# Patient Record
Sex: FEMALE | Race: OTHER | NOT HISPANIC OR LATINO | Employment: OTHER | URBAN - NONMETROPOLITAN AREA
[De-identification: names, ages, dates, MRNs, and addresses within clinical notes are randomized per-mention and may not be internally consistent; named-entity substitution may affect disease eponyms.]

---

## 2017-01-05 ENCOUNTER — OFFICE VISIT (OUTPATIENT)
Dept: OBSTETRICS AND GYNECOLOGY | Facility: CLINIC | Age: 67
End: 2017-01-05

## 2017-01-05 VITALS
SYSTOLIC BLOOD PRESSURE: 120 MMHG | WEIGHT: 163 LBS | HEIGHT: 64 IN | DIASTOLIC BLOOD PRESSURE: 84 MMHG | BODY MASS INDEX: 27.83 KG/M2

## 2017-01-05 DIAGNOSIS — Z78.9 NONSMOKER: Primary | ICD-10-CM

## 2017-01-05 DIAGNOSIS — N39.3 SUI (STRESS URINARY INCONTINENCE, FEMALE): ICD-10-CM

## 2017-01-05 DIAGNOSIS — N81.11 MIDLINE CYSTOCELE: ICD-10-CM

## 2017-01-05 DIAGNOSIS — N39.41 URGE INCONTINENCE: ICD-10-CM

## 2017-01-05 PROCEDURE — 99214 OFFICE O/P EST MOD 30 MIN: CPT | Performed by: OBSTETRICS & GYNECOLOGY

## 2017-01-05 RX ORDER — TOLTERODINE 4 MG/1
4 CAPSULE, EXTENDED RELEASE ORAL DAILY
Qty: 30 CAPSULE | Refills: 1 | Status: SHIPPED | OUTPATIENT
Start: 2017-01-05 | End: 2017-02-23 | Stop reason: SDUPTHER

## 2017-01-05 NOTE — PROGRESS NOTES
"Subjective   Chief Complaint   Patient presents with   • Vaginal Prolapse     pt here today with c/o prolapse. pt says that she has tried the pessary and does not like it. pt says that she has trouble holding urine. pt says that when she gets the urge she has to find a bathroom pretty quick. pt voices no other concerns.      Reina Palomares is a 66 y.o. year old who presents to be seen for pelvic prolapse.      [unfilled]    The patient is not s/p hysterectomy.  She reports positive urinary incontinence and urgency but denies pelvic pain, vaginal pressure, bulge outside her body, pain with intercourse and stool trapping.  The patient feels like the problem began several years ago.  She is not currently sexually active, and is interested in being sexually active in the future.  Reina Palomares has not had surgery for this problem in the past.  Non-smoker.  She drinks mostly decaf coffee and does not drink soda.  She has never tried an anticholinergic.  She did have someone attempt to place a pessary, but they could not find one that fit well.    Past Medical History   Diagnosis Date   • Cancer      LUNG   • Hyperthyroidism    • Infectious viral hepatitis      H/O HEPATITIS C   • Swelling of lower extremity        Current Outpatient Prescriptions:   •  conjugated estrogens (PREMARIN) 0.625 MG/GM vaginal cream, Use 0.5 gm nightly for 2 weeks then decrease to 2 times a week., Disp: 30 g, Rfl: 3  •  gabapentin (NEURONTIN) 100 MG capsule, TK 2 CS PO BID, Disp: , Rfl: 2  •  levothyroxine (SYNTHROID, LEVOTHROID) 75 MCG tablet, TK 1 T PO QAM ON AN EMPTY STOMACH, Disp: , Rfl: 6  •  metoprolol tartrate (LOPRESSOR) 25 MG tablet, Take 25 mg by mouth 2 (two) times a day., Disp: , Rfl:   •  omeprazole (PriLOSEC) 40 MG capsule, , Disp: , Rfl:   •  PROLENSA 0.07 % solution, INSTILL 1 DROP IN THE LEFT EYE QD, Disp: , Rfl: 5  •  SAFETY-AD TB SYRINGE 27GX.5\" 27G X 1/2\" 1 ML misc, USE AS DIRECTED WITH ALLERGY SERUM, Disp: , Rfl: 2  •  " "sertraline (ZOLOFT) 50 MG tablet, Take 50 mg by mouth daily., Disp: , Rfl:   •  zolpidem (AMBIEN) 5 MG tablet, TK 1 T PO QHS, Disp: , Rfl: 2  Family History   Problem Relation Age of Onset   • Cancer Other    • Coronary artery disease Other    • Thyroid disease Other    • Melanoma Other    • Melanoma Sister      LEFT ARM   • Hypothyroidism Sister    • Cancer Brother    • Coronary artery disease Brother    • Hyperthyroidism Sister    • Other Mother    • Other Father      Social History     Social History   • Marital status:      Spouse name: N/A   • Number of children: N/A   • Years of education: N/A     Occupational History   • DISABLED      Social History Main Topics   • Smoking status: Former Smoker     Packs/day: 1.50     Types: Cigarettes     Quit date: 2008   • Smokeless tobacco: Never Used      Comment: LESS THAN 1 PPD FOR 12 YEARS   • Alcohol use Yes      Comment: OCCASIONAL WINE   • Drug use: No   • Sexual activity: Defer     Other Topics Concern   • Not on file     Social History Narrative     Allergies   Allergen Reactions   • Dilaudid [Hydromorphone Hcl]    • Myrbetriq [Mirabegron] Hives       Family History   Problem Relation Age of Onset   • Cancer Other    • Coronary artery disease Other    • Thyroid disease Other    • Melanoma Other    • Melanoma Sister      LEFT ARM   • Hypothyroidism Sister    • Cancer Brother    • Coronary artery disease Brother    • Hyperthyroidism Sister    • Other Mother    • Other Father      Review of Systems   Respiratory: Negative for shortness of breath.    Cardiovascular: Negative for chest pain.   Gastrointestinal: Negative for abdominal pain, constipation and diarrhea.   Genitourinary: Negative for difficulty urinating, dysuria, pelvic pain and vaginal bleeding.           Objective   Visit Vitals   • /84   • Ht 64\" (162.6 cm)   • Wt 163 lb (73.9 kg)   • BMI 27.98 kg/m2       Physical Exam   Constitutional: She appears well-developed and well-nourished. No " distress.   HENT:   Head: Normocephalic and atraumatic.   Eyes: EOM are normal.   Neck: Normal range of motion.   Pulmonary/Chest: Effort normal.   Abdominal: Soft. There is no tenderness.   Genitourinary: Pelvic exam was performed with patient supine.   Skin: Skin is warm and dry.   Psychiatric: She has a normal mood and affect. Judgment and thought content normal.   Nursing note and vitals reviewed.      Imaging   No data reviewed       Assessment & Plan  Reina was seen today for vaginal prolapse.    Diagnoses and all orders for this visit:    Nonsmoker    Urge incontinence  Comments:  Patient going to try an Detrol LA 4 mg.  RTO in 2 months    JOSE (stress urinary incontinence, female): appears to be less of a problem than urge incontinence.  Will discuss whether sling is necessary after trial of Detrol.  Patient may still need urodynamics  Comments:  with coughing    Midline cystocele: Likely not a contributing factor to urge or JOSE.  She should not have incomplete emptying due to her current level of prolapse.  Comments:  Grade II      Na Kate MD  1/5/2017  10:05 AM

## 2017-01-05 NOTE — MR AVS SNAPSHOT
"                        Reina Palomares   1/5/2017 9:45 AM   Office Visit    Dept Phone:  [phone]   Encounter #:  [id number]    Provider:  Na Kate MD   Department:  [facility]                Your Full Care Plan              Today's Medication Changes          These changes are accurate as of: 1/5/17 10:22 AM.  If you have any questions, ask your nurse or doctor.               New Medication(s)Ordered:     tolterodine LA 4 MG 24 hr capsule   Commonly known as:  DETROL LA   Take 1 capsule by mouth Daily.            Where to Get Your Medications      These medications were sent to [facility] [address] [place] [address] - [address] [facility] AT [address] - [phone] [facility] [phone]   [address] [place] [id number]     Phone:  [phone]     tolterodine LA 4 MG 24 hr capsule                  Your Updated Medication List          This list is accurate as of: 1/5/17 10:22 AM.  Always use your most recent med list.                conjugated estrogens 0.625 MG/GM vaginal cream   Commonly known as:  PREMARIN   Use 0.5 gm nightly for 2 weeks then decrease to 2 times a week.       gabapentin 100 MG capsule   Commonly known as:  NEURONTIN       levothyroxine 75 MCG tablet   Commonly known as:  SYNTHROID, LEVOTHROID       metoprolol tartrate 25 MG tablet   Commonly known as:  LOPRESSOR       omeprazole 40 MG capsule   Commonly known as:  priLOSEC       PROLENSA 0.07 % solution   Generic drug:  Bromfenac Sodium       SAFETY-AD TB SYRINGE 27GX.5\" 27G X 1/2\" 1 ML misc   Generic drug:  Tuberculin Syringe       sertraline 50 MG tablet   Commonly known as:  ZOLOFT       tolterodine LA 4 MG 24 hr capsule   Commonly known as:  DETROL LA   Take 1 capsule by mouth Daily.       zolpidem 5 MG tablet   Commonly known as:  AMBIEN               You Were Diagnosed With        Codes Comments    Nonsmoker    -  Primary ICD-10-CM: Z78.9  ICD-9-CM: V49.89     Urge incontinence     ICD-10-CM: " N39.41  ICD-9-CM: 788.31 Patient going to try an anticholinergic    JOSE (stress urinary incontinence, female)     ICD-10-CM: N39.3  ICD-9-CM: 625.6 with coughing    Midline cystocele     ICD-10-CM: N81.11  ICD-9-CM: 618.01 Grade II      Instructions     None    Patient Instructions History      Upcoming Appointments     Visit Type Date Time Department    OFFICE VISIT 2017  9:45 AM [facility]    COLPOSCOPY 3/9/2017  9:00 AM [facility]    FOLLOW UP 2017  2:45 PM [facility]      MyChart Signup     [facility] Like[employer] allows you to send messages to your doctor, view your test results, renew your prescriptions, schedule appointments, and more. To sign up, go to [employer]view and click on the Sign Up Now link in the New User? box. Enter your Like[employer] Activation Code exactly as it appears below along with the last four digits of your Social Security Number and your Date of Birth () to complete the sign-up process. If you do not sign up before the expiration date, you must request a new code.    Likeeds Activation Code: [email or web address]  Expires: 2017  4:36 AM    If you have questions, you can email [email or web address] or call [phone] to talk to our Like[employer] staff. Remember, Likeeds is NOT to be used for urgent needs. For medical emergencies, dial 911.               Other Info from Your Visit           Your Appointments     Mar 09, 2017  9:00 AM CST   COLPOSCOPY with Na Kate MD   [facility] (--)    [address]  [place] [address] [phone]            Aug 29, 2017  2:45 PM CDT   Follow Up with Elan Mathews DO   [facility] (--)    [address]  [place] [id number] [phone]           Arrive 15 minutes prior to appointment.              Allergies     Dilaudid [Hydromorphone Hcl]      Myrbetriq [Mirabegron]  Hives      Reason for Visit     Vaginal  "Prolapse pt here today with c/o prolapse. pt says that she has tried the pessary and does not like it. pt says that she has trouble holding urine. pt says that when she gets the urge she has to find a bathroom pretty quick. pt voices no other concerns.       Vital Signs     Blood Pressure Height Weight Body Mass Index Smoking Status       120/84 64\" (162.6 cm) 163 lb (73.9 kg) 27.98 kg/m2 Former Smoker       Problems and Diagnoses Noted     Nonsmoker    -  Primary    Urge incontinence of urine        JOSE (stress urinary incontinence, female)        Fallen bladder            "

## 2017-02-01 ENCOUNTER — TELEPHONE (OUTPATIENT)
Dept: OBSTETRICS AND GYNECOLOGY | Facility: CLINIC | Age: 67
End: 2017-02-01

## 2017-02-01 NOTE — TELEPHONE ENCOUNTER
Can you call her back and clarify...She had a colpo of her Vulva in September.  At that time, I offered her the options of repeating her colpo in 6 months or seeing Mary Ann.  She preferred to return for another colpo of her vulva, and I was under the impression that we would be doing that in March.  If she prefers, then we can go ahead and set her up with Mary Ann now.  Thanks

## 2017-02-23 DIAGNOSIS — N39.41 URGE INCONTINENCE: ICD-10-CM

## 2017-02-23 RX ORDER — TOLTERODINE 4 MG/1
CAPSULE, EXTENDED RELEASE ORAL
Qty: 30 CAPSULE | Refills: 0 | Status: SHIPPED | OUTPATIENT
Start: 2017-02-23 | End: 2017-03-19 | Stop reason: SDUPTHER

## 2017-03-19 DIAGNOSIS — N39.41 URGE INCONTINENCE: ICD-10-CM

## 2017-03-20 RX ORDER — TOLTERODINE 4 MG/1
CAPSULE, EXTENDED RELEASE ORAL
Qty: 30 CAPSULE | Refills: 0 | Status: SHIPPED | OUTPATIENT
Start: 2017-03-20 | End: 2017-09-08

## 2017-03-21 ENCOUNTER — TELEPHONE (OUTPATIENT)
Dept: OBSTETRICS AND GYNECOLOGY | Facility: CLINIC | Age: 67
End: 2017-03-21

## 2017-06-21 ENCOUNTER — TELEPHONE (OUTPATIENT)
Dept: OBSTETRICS AND GYNECOLOGY | Facility: CLINIC | Age: 67
End: 2017-06-21

## 2017-06-21 NOTE — TELEPHONE ENCOUNTER
Spoke with pt RE: if follow up needed for her visit with Dr. Reese 2-2017 where she had a vaginal laser procedure for VAIN 1. She stated that per their office she was to follow up with pap in 1 year for her yearly exam at our office. She was unclear if they meant one year from her visit with them or when her yearly is due (Hortensia 15, 2017) . I will call Mary Ann's office to clarify.

## 2017-06-21 NOTE — TELEPHONE ENCOUNTER
I spoke with Dr. Reese's office to find out her planned follow up. They said that she has a follow up appt with their office on 3-21-18 at 1100. Left message on pt machine with this information.

## 2017-09-08 ENCOUNTER — HOSPITAL ENCOUNTER (OUTPATIENT)
Dept: ULTRASOUND IMAGING | Facility: HOSPITAL | Age: 67
Discharge: HOME | End: 2017-09-08
Admitting: NURSE PRACTITIONER

## 2017-09-08 ENCOUNTER — OFFICE VISIT (OUTPATIENT)
Dept: VASCULAR SURGERY | Facility: CLINIC | Age: 67
End: 2017-09-08

## 2017-09-08 VITALS
HEART RATE: 92 BPM | BODY MASS INDEX: 28.09 KG/M2 | HEIGHT: 64 IN | WEIGHT: 164.5 LBS | SYSTOLIC BLOOD PRESSURE: 118 MMHG | DIASTOLIC BLOOD PRESSURE: 76 MMHG | RESPIRATION RATE: 18 BRPM

## 2017-09-08 DIAGNOSIS — M79.605 LEFT LEG PAIN: ICD-10-CM

## 2017-09-08 DIAGNOSIS — M79.605 LEFT LEG PAIN: Primary | ICD-10-CM

## 2017-09-08 PROCEDURE — 93971 EXTREMITY STUDY: CPT

## 2017-09-08 PROCEDURE — 99213 OFFICE O/P EST LOW 20 MIN: CPT | Performed by: NURSE PRACTITIONER

## 2017-09-08 RX ORDER — TRAMADOL HYDROCHLORIDE 50 MG/1
TABLET ORAL
Refills: 0 | COMMUNITY
Start: 2017-06-27 | End: 2017-09-08

## 2017-09-08 RX ORDER — LEVOTHYROXINE SODIUM 88 UG/1
TABLET ORAL
Refills: 2 | Status: ON HOLD | COMMUNITY
Start: 2017-07-23 | End: 2018-12-28

## 2017-09-08 RX ORDER — AMLODIPINE BESYLATE 5 MG/1
TABLET ORAL
Refills: 5 | Status: ON HOLD | COMMUNITY
Start: 2017-08-15 | End: 2018-12-28

## 2017-09-08 NOTE — PROGRESS NOTES
"09/08/2017      Carrie Darby DO  [address] [place] [address]        Reina Palomares  1950    Chief Complaint   Patient presents with   • Follow-up     1 year         Dear Carrie Darby DO:    HPI     I had the pleasure of seeing you patient in the office today for follow up.  As you recall, the patient is a 67 y.o. female who we are currently following for Routine health maintenance.  She has been having pain in her left leg for the past couple weeks, and states she can feel a knot to her left calf at times.  She was being followed for venous insufficiency in our office however did not come back for follow-up.  She states she is having trouble walking.  She has mild swelling noted to her left lower extremity.    /76 (BP Location: Left arm, Patient Position: Sitting, Cuff Size: Adult)  Pulse 92  Resp 18  Ht 64\" (162.6 cm)  Wt 164 lb 8 oz (74.6 kg)  BMI 28.24 kg/m2  Physical Exam   Constitutional: She is oriented to person, place, and time. She appears well-developed and well-nourished. No distress.   HENT:   Head: Normocephalic and atraumatic.   Mouth/Throat: Oropharynx is clear and moist.   Eyes: Pupils are equal, round, and reactive to light. No scleral icterus.   Neck: Normal range of motion. Neck supple. No JVD present. Carotid bruit is not present. No thyromegaly present.   Cardiovascular: Normal rate, regular rhythm, S2 normal, normal heart sounds, intact distal pulses and normal pulses.  Exam reveals no gallop and no friction rub.    No murmur heard.  Tenderness to left calf   Pulmonary/Chest: Effort normal and breath sounds normal.   Abdominal: Soft. Normal aorta and bowel sounds are normal. There is no hepatosplenomegaly.   Musculoskeletal: Normal range of motion.   Neurological: She is alert and oriented to person, place, and time. No cranial nerve deficit.   Skin: Skin is warm and dry. She is not diaphoretic.   Psychiatric: She has a normal mood and affect. Her " behavior is normal. Judgment and thought content normal.   Nursing note and vitals reviewed.      DIAGNOSTIC DATA:    Us Venous Doppler Lower Extremity Left (duplex)    Result Date: 9/8/2017  Narrative: EXAMINATION:   US VENOUS DOPPLER LOWER EXTREMITY LEFT (DUPLEX)- 9/8/2017 3:30 PM CST  HISTORY: US VENOUS DOPPLER LOWER EXTREMITY LEFT (DUPLEX)- 9/8/2017 2:59 PM CST  HISTORY: left leg pain r/o DVT; M79.605-Pain In Left Leg  COMPARISON: NONE  FINDINGS: Transverse and longitudinal grayscale and duplex and Doppler sonographic images of the left lower extremity were obtained.  There is normal color flow, augmentation and compressibility of the left common femoral, superficial femoral, popliteal, peroneal,  and posterior tibial veins.  A 7 x 18 mm hypoechoic density is present in the medial left calf. This may be a varicosity.   Color flow is present in the anterior tibial vein..      Impression: 1. Negative duplex ultrasound of the left lower extremity without evidence of DVT. 2. Probable varicose vein or lymph node in the medial left calf  This report was finalized on 09/08/2017 16:33 by Dr. Bernardo Milian MD.      Patient Active Problem List   Diagnosis   • Swelling of lower extremity   • GERD (gastroesophageal reflux disease)   • Hypothyroidism         ICD-10-CM ICD-9-CM   1. Left leg pain M79.605 729.5       Lab Frequency Next Occurrence   Comprehensive Metabolic Panel Once 10/13/2016   CBC & Differential Once 10/13/2016       PLAN: After thoroughly evaluating Reina Palomares, I believe the best course of action is to remain conservative from a vascular standpoint.  Her testing was negative for a DVT.  She states she still can hardly walk.  I did urge her to wear compression stockings.  If the pain persists, I encouraged her to follow up with primary care.  The ultrasound of her left leg showed probable varicose vein but could be a lymph node.  There is no intervention needed from a vascular standpoint.  The patient is  to continue taking their medications as previously discussed.   This was all discussed in full with complete understanding.  Thank you for allowing me to participate in the care of your patient.  Please do not hesitate to call with any questions or concerns.  We will keep you aware of any further encounters with Reina Palomares.      Sincerely Yours,      TRACEY Batista

## 2017-09-11 ENCOUNTER — TELEPHONE (OUTPATIENT)
Dept: VASCULAR SURGERY | Facility: CLINIC | Age: 67
End: 2017-09-11

## 2017-09-11 NOTE — TELEPHONE ENCOUNTER
Called patient and gave vascular study report.Instructed to follow up with PCP for persistent symptoms.Voiced understanding.

## 2017-09-11 NOTE — TELEPHONE ENCOUNTER
----- Message from TRACEY Batista sent at 9/11/2017 10:26 AM CDT -----  Sandra can you call and give her recent venous duplex results.  There was no dvt..      Impression: 1. Negative duplex ultrasound of the left lower extremity without evidence of DVT. 2. Probable varicose vein or lymph node in the medial left calf  This report was finalized on 09/08/2017 16:33 by Dr. Bernardo Milian MD.    Her pain may be related to that varicose vein.  If it delgado not resolve she should follow with her PCP.  Nothing needed from a vascular standpoint.

## 2017-09-11 NOTE — TELEPHONE ENCOUNTER
I tried to call her Friday, and no answer.  It just says her number on voicemail so I did not want to leave results.  I tried to call her again but no answer.  Ill route to have Sandra try in a bit.

## 2017-09-21 ENCOUNTER — OFFICE VISIT (OUTPATIENT)
Dept: OBSTETRICS AND GYNECOLOGY | Facility: CLINIC | Age: 67
End: 2017-09-21

## 2017-09-21 VITALS
SYSTOLIC BLOOD PRESSURE: 120 MMHG | HEIGHT: 64 IN | WEIGHT: 165 LBS | DIASTOLIC BLOOD PRESSURE: 76 MMHG | BODY MASS INDEX: 28.17 KG/M2

## 2017-09-21 DIAGNOSIS — N64.4 BREAST PAIN IN FEMALE: Primary | ICD-10-CM

## 2017-09-21 DIAGNOSIS — Z12.31 ENCOUNTER FOR SCREENING MAMMOGRAM FOR MALIGNANT NEOPLASM OF BREAST: ICD-10-CM

## 2017-09-21 PROCEDURE — 99213 OFFICE O/P EST LOW 20 MIN: CPT | Performed by: NURSE PRACTITIONER

## 2017-09-21 NOTE — PROGRESS NOTES
"Subjective   Reina Palomares is a 67 y.o. female.     HPI Comments: Breast pain.       The following portions of the patient's history were reviewed and updated as appropriate: allergies, current medications, past family history, past medical history, past social history, past surgical history and problem list.    /76  Ht 64\" (162.6 cm)  Wt 165 lb (74.8 kg)  BMI 28.32 kg/m2    Review of Systems   Constitutional: Negative for activity change, appetite change, fatigue and fever.        Breast pain, last mammogram mentioned a clip.   About 3 wks ago, there was a pop when she leaned over the tub and the breast has been very sore since.    Respiratory: Negative for apnea and shortness of breath.    Cardiovascular: Negative for chest pain and palpitations.   Gastrointestinal: Negative for abdominal distention, abdominal pain, constipation, diarrhea, nausea and vomiting.   Endocrine: Negative for cold intolerance and heat intolerance.   Genitourinary: Negative for difficulty urinating, frequency, menstrual problem, pelvic pain, vaginal discharge and vaginal pain.   Neurological: Negative for headaches.   Psychiatric/Behavioral: Negative for agitation and sleep disturbance.       Objective   Physical Exam   Constitutional: She is oriented to person, place, and time. She appears well-developed.   Eyes: Right eye exhibits no discharge. Left eye exhibits no discharge.   Cardiovascular: Normal rate and regular rhythm.    Pulmonary/Chest: Effort normal and breath sounds normal. Right breast exhibits no inverted nipple, no mass, no nipple discharge, no skin change and no tenderness. Left breast exhibits tenderness. Left breast exhibits no inverted nipple, no mass, no nipple discharge and no skin change. Breasts are symmetrical. There is no breast swelling.   Genitourinary: No breast discharge or bleeding.   Neurological: She is alert and oriented to person, place, and time.   Skin: Skin is warm.   Psychiatric: She has a " normal mood and affect. Her behavior is normal. Judgment and thought content normal.   Vitals reviewed.      Assessment/Plan    Reviewed mammogram from 4/27/2016, report indicated BI-RADS Category 2 Benign.  The clip pt mentions is noted at the left axilla per mammogram report.   Discussed results and notations with pt.   Will order diagnostic left mammogram and US PRN, and a screening mammogram for right breast.   RV based on results.  Reina was seen today for breast pain.    Diagnoses and all orders for this visit:    Breast pain in female  -     Mammo Diagnostic Digital Tomosynthesis Bilateral With CAD; Future  -     US breast left limited; Future    Encounter for screening mammogram for malignant neoplasm of breast  -     Mammo screening modified with tomosynthesis right w CAD; Future

## 2017-09-28 ENCOUNTER — TELEPHONE (OUTPATIENT)
Dept: OBSTETRICS AND GYNECOLOGY | Facility: CLINIC | Age: 67
End: 2017-09-28

## 2017-09-28 ENCOUNTER — HOSPITAL ENCOUNTER (OUTPATIENT)
Dept: ULTRASOUND IMAGING | Facility: HOSPITAL | Age: 67
Discharge: HOME | End: 2017-09-28

## 2017-09-28 ENCOUNTER — HOSPITAL ENCOUNTER (OUTPATIENT)
Dept: MAMMOGRAPHY | Facility: HOSPITAL | Age: 67
Discharge: HOME | End: 2017-09-28
Admitting: NURSE PRACTITIONER

## 2017-09-28 DIAGNOSIS — N64.4 BREAST PAIN IN FEMALE: ICD-10-CM

## 2017-09-28 PROCEDURE — G0279 TOMOSYNTHESIS, MAMMO: HCPCS

## 2017-09-28 PROCEDURE — G0204 DX MAMMO INCL CAD BI: HCPCS

## 2017-09-28 PROCEDURE — 76642 ULTRASOUND BREAST LIMITED: CPT

## 2017-09-28 NOTE — TELEPHONE ENCOUNTER
Spoke with pt, reviewed results from mammo and US.   Will refer to surgeon. Pt desires to go to a surgeon in [place]. Pt to call with physician's name.     Will submit referral as soon as I have that information.

## 2017-10-02 DIAGNOSIS — N64.4 BREAST PAIN: Primary | ICD-10-CM

## 2018-01-19 ENCOUNTER — HOSPITAL ENCOUNTER (OUTPATIENT)
Dept: PREADMISSION TESTING | Age: 68
Setting detail: OUTPATIENT SURGERY
Discharge: HOME | End: 2018-01-19

## 2018-01-19 VITALS — HEIGHT: 65 IN | BODY MASS INDEX: 26.99 KG/M2 | WEIGHT: 162 LBS

## 2018-01-22 ENCOUNTER — HOSPITAL ENCOUNTER (OUTPATIENT)
Dept: LAB | Age: 68
Discharge: HOME | End: 2018-01-22
Payer: MEDICARE

## 2018-01-22 ENCOUNTER — HOSPITAL ENCOUNTER (OUTPATIENT)
Dept: NON INVASIVE DIAGNOSTICS | Age: 68
Discharge: HOME | End: 2018-01-22
Payer: MEDICARE

## 2018-01-22 ENCOUNTER — HOSPITAL ENCOUNTER (OUTPATIENT)
Dept: GENERAL RADIOLOGY | Age: 68
Discharge: HOME | End: 2018-01-22
Payer: MEDICARE

## 2018-01-22 DIAGNOSIS — Z01.818 PREOP EXAMINATION: ICD-10-CM

## 2018-01-22 LAB
ALBUMIN SERPL-MCNC: 4.4 G/DL (ref 3.5–5.2)
ALP BLD-CCNC: 73 U/L (ref 35–104)
ALT SERPL-CCNC: 19 U/L (ref 5–33)
ANION GAP SERPL CALCULATED.3IONS-SCNC: 11 MMOL/L (ref 7–19)
AST SERPL-CCNC: 25 U/L (ref 5–32)
BASOPHILS ABSOLUTE: 0.1 K/UL (ref 0–0.2)
BASOPHILS RELATIVE PERCENT: 1 % (ref 0–1)
BILIRUB SERPL-MCNC: 0.6 MG/DL (ref 0.2–1.2)
BUN BLDV-MCNC: 17 MG/DL (ref 8–23)
CALCIUM SERPL-MCNC: 9.4 MG/DL (ref 8.8–10.2)
CHLORIDE BLD-SCNC: 103 MMOL/L (ref 98–111)
CO2: 27 MMOL/L (ref 22–29)
CREAT SERPL-MCNC: 0.7 MG/DL (ref 0.5–0.9)
EOSINOPHILS ABSOLUTE: 0.1 K/UL (ref 0–0.6)
EOSINOPHILS RELATIVE PERCENT: 2.1 % (ref 0–5)
GFR NON-AFRICAN AMERICAN: >60
GLUCOSE BLD-MCNC: 92 MG/DL (ref 74–109)
HCT VFR BLD CALC: 39.4 % (ref 37–47)
HEMOGLOBIN: 12.9 G/DL (ref 12–16)
LYMPHOCYTES ABSOLUTE: 1.4 K/UL (ref 1.1–4.5)
LYMPHOCYTES RELATIVE PERCENT: 27.5 % (ref 20–40)
MCH RBC QN AUTO: 30.9 PG (ref 27–31)
MCHC RBC AUTO-ENTMCNC: 32.7 G/DL (ref 33–37)
MCV RBC AUTO: 94.5 FL (ref 81–99)
MONOCYTES ABSOLUTE: 0.5 K/UL (ref 0–0.9)
MONOCYTES RELATIVE PERCENT: 10.5 % (ref 0–10)
NEUTROPHILS ABSOLUTE: 3 K/UL (ref 1.5–7.5)
NEUTROPHILS RELATIVE PERCENT: 58.7 % (ref 50–65)
PDW BLD-RTO: 13 % (ref 11.5–14.5)
PLATELET # BLD: 135 K/UL (ref 130–400)
PMV BLD AUTO: 10.6 FL (ref 9.4–12.3)
POTASSIUM SERPL-SCNC: 4.8 MMOL/L (ref 3.5–5)
RBC # BLD: 4.17 M/UL (ref 4.2–5.4)
SODIUM BLD-SCNC: 141 MMOL/L (ref 136–145)
TOTAL PROTEIN: 8 G/DL (ref 6.6–8.7)
WBC # BLD: 5.2 K/UL (ref 4.8–10.8)

## 2018-01-22 PROCEDURE — 93005 ELECTROCARDIOGRAM TRACING: CPT

## 2018-01-22 PROCEDURE — 71046 X-RAY EXAM CHEST 2 VIEWS: CPT

## 2018-01-30 ENCOUNTER — ANESTHESIA EVENT (OUTPATIENT)
Dept: OPERATING ROOM | Age: 68
End: 2018-01-30

## 2018-01-30 ENCOUNTER — ANESTHESIA (OUTPATIENT)
Dept: OPERATING ROOM | Age: 68
End: 2018-01-30

## 2018-01-30 ENCOUNTER — HOSPITAL ENCOUNTER (OUTPATIENT)
Age: 68
Setting detail: OUTPATIENT SURGERY
Discharge: HOME | End: 2018-01-30
Attending: OPHTHALMOLOGY | Admitting: OPHTHALMOLOGY

## 2018-01-30 VITALS
HEART RATE: 62 BPM | DIASTOLIC BLOOD PRESSURE: 77 MMHG | RESPIRATION RATE: 18 BRPM | SYSTOLIC BLOOD PRESSURE: 120 MMHG | OXYGEN SATURATION: 96 % | TEMPERATURE: 98.4 F

## 2018-01-30 VITALS
DIASTOLIC BLOOD PRESSURE: 53 MMHG | OXYGEN SATURATION: 98 % | SYSTOLIC BLOOD PRESSURE: 89 MMHG | TEMPERATURE: 98.6 F | RESPIRATION RATE: 1 BRPM

## 2018-01-30 PROCEDURE — G8918 PT W/O PREOP ORDER IV AB PRO: HCPCS

## 2018-01-30 PROCEDURE — G8907 PT DOC NO EVENTS ON DISCHARG: HCPCS

## 2018-01-30 PROCEDURE — 15822 BLEPHAROPLASTY UPPER EYELID: CPT

## 2018-01-30 RX ORDER — SODIUM CHLORIDE, SODIUM LACTATE, POTASSIUM CHLORIDE, CALCIUM CHLORIDE 600; 310; 30; 20 MG/100ML; MG/100ML; MG/100ML; MG/100ML
INJECTION, SOLUTION INTRAVENOUS CONTINUOUS
Status: DISCONTINUED | OUTPATIENT
Start: 2018-01-30 | End: 2018-01-30 | Stop reason: HOSPADM

## 2018-01-30 RX ORDER — MEPERIDINE HYDROCHLORIDE 25 MG/ML
12.5 INJECTION INTRAMUSCULAR; INTRAVENOUS; SUBCUTANEOUS EVERY 5 MIN PRN
Status: DISCONTINUED | OUTPATIENT
Start: 2018-01-30 | End: 2018-01-30 | Stop reason: HOSPADM

## 2018-01-30 RX ORDER — HYDRALAZINE HYDROCHLORIDE 20 MG/ML
5 INJECTION INTRAMUSCULAR; INTRAVENOUS EVERY 10 MIN PRN
Status: DISCONTINUED | OUTPATIENT
Start: 2018-01-30 | End: 2018-01-30 | Stop reason: HOSPADM

## 2018-01-30 RX ORDER — DIPHENHYDRAMINE HYDROCHLORIDE 50 MG/ML
12.5 INJECTION INTRAMUSCULAR; INTRAVENOUS
Status: DISCONTINUED | OUTPATIENT
Start: 2018-01-30 | End: 2018-01-30 | Stop reason: HOSPADM

## 2018-01-30 RX ORDER — ENALAPRILAT 2.5 MG/2ML
1.25 INJECTION INTRAVENOUS
Status: DISCONTINUED | OUTPATIENT
Start: 2018-01-30 | End: 2018-01-30 | Stop reason: HOSPADM

## 2018-01-30 RX ORDER — MIDAZOLAM HYDROCHLORIDE 1 MG/ML
INJECTION INTRAMUSCULAR; INTRAVENOUS PRN
Status: DISCONTINUED | OUTPATIENT
Start: 2018-01-30 | End: 2018-01-30 | Stop reason: SDUPTHER

## 2018-01-30 RX ORDER — PROPOFOL 10 MG/ML
INJECTION, EMULSION INTRAVENOUS PRN
Status: DISCONTINUED | OUTPATIENT
Start: 2018-01-30 | End: 2018-01-30 | Stop reason: SDUPTHER

## 2018-01-30 RX ORDER — ONDANSETRON 2 MG/ML
INJECTION INTRAMUSCULAR; INTRAVENOUS PRN
Status: DISCONTINUED | OUTPATIENT
Start: 2018-01-30 | End: 2018-01-30 | Stop reason: SDUPTHER

## 2018-01-30 RX ORDER — METOCLOPRAMIDE HYDROCHLORIDE 5 MG/ML
10 INJECTION INTRAMUSCULAR; INTRAVENOUS
Status: COMPLETED | OUTPATIENT
Start: 2018-01-30 | End: 2018-01-30

## 2018-01-30 RX ORDER — LABETALOL HYDROCHLORIDE 5 MG/ML
5 INJECTION, SOLUTION INTRAVENOUS EVERY 10 MIN PRN
Status: DISCONTINUED | OUTPATIENT
Start: 2018-01-30 | End: 2018-01-30 | Stop reason: HOSPADM

## 2018-01-30 RX ORDER — FENTANYL CITRATE 50 UG/ML
INJECTION, SOLUTION INTRAMUSCULAR; INTRAVENOUS PRN
Status: DISCONTINUED | OUTPATIENT
Start: 2018-01-30 | End: 2018-01-30 | Stop reason: SDUPTHER

## 2018-01-30 RX ORDER — ERYTHROMYCIN 5 MG/G
OINTMENT OPHTHALMIC PRN
Status: DISCONTINUED | OUTPATIENT
Start: 2018-01-30 | End: 2018-01-30 | Stop reason: HOSPADM

## 2018-01-30 RX ORDER — LIDOCAINE HYDROCHLORIDE 10 MG/ML
1 INJECTION, SOLUTION EPIDURAL; INFILTRATION; INTRACAUDAL; PERINEURAL
Status: DISCONTINUED | OUTPATIENT
Start: 2018-01-30 | End: 2018-01-30 | Stop reason: HOSPADM

## 2018-01-30 RX ORDER — LIDOCAINE HYDROCHLORIDE AND EPINEPHRINE BITARTRATE 20; .01 MG/ML; MG/ML
INJECTION, SOLUTION SUBCUTANEOUS PRN
Status: DISCONTINUED | OUTPATIENT
Start: 2018-01-30 | End: 2018-01-30 | Stop reason: HOSPADM

## 2018-01-30 RX ORDER — PROMETHAZINE HYDROCHLORIDE 25 MG/ML
6.25 INJECTION, SOLUTION INTRAMUSCULAR; INTRAVENOUS
Status: DISCONTINUED | OUTPATIENT
Start: 2018-01-30 | End: 2018-01-30 | Stop reason: HOSPADM

## 2018-01-30 RX ADMIN — ONDANSETRON 4 MG: 2 INJECTION INTRAMUSCULAR; INTRAVENOUS at 10:00

## 2018-01-30 RX ADMIN — FENTANYL CITRATE 50 MCG: 50 INJECTION, SOLUTION INTRAMUSCULAR; INTRAVENOUS at 08:53

## 2018-01-30 RX ADMIN — SODIUM CHLORIDE, SODIUM LACTATE, POTASSIUM CHLORIDE, CALCIUM CHLORIDE: 600; 310; 30; 20 INJECTION, SOLUTION INTRAVENOUS at 07:33

## 2018-01-30 RX ADMIN — PROPOFOL 150 MG: 10 INJECTION, EMULSION INTRAVENOUS at 08:53

## 2018-01-30 RX ADMIN — MIDAZOLAM HYDROCHLORIDE 1 MG: 1 INJECTION INTRAMUSCULAR; INTRAVENOUS at 08:47

## 2018-01-30 RX ADMIN — METOCLOPRAMIDE HYDROCHLORIDE 10 MG: 5 INJECTION INTRAMUSCULAR; INTRAVENOUS at 10:40

## 2018-01-30 ASSESSMENT — COPD QUESTIONNAIRES: CAT_SEVERITY: NO INTERVAL CHANGE

## 2018-01-30 NOTE — ANESTHESIA PRE PROCEDURE
°F (36.7 °C)   SpO2: 98%                                              BP Readings from Last 3 Encounters:   01/30/18 (!) 144/87       NPO Status: Time of last liquid consumption: 2300                        Time of last solid consumption: 2300                        Date of last liquid consumption: 01/29/18                        Date of last solid food consumption: 01/29/18    BMI:   Wt Readings from Last 3 Encounters:   01/19/18 162 lb (73.5 kg)     There is no height or weight on file to calculate BMI.    CBC:   Lab Results   Component Value Date    WBC 5.2 01/22/2018    RBC 4.17 01/22/2018    HGB 12.9 01/22/2018    HCT 39.4 01/22/2018    MCV 94.5 01/22/2018    RDW 13.0 01/22/2018     01/22/2018       CMP:   Lab Results   Component Value Date     01/22/2018    K 4.8 01/22/2018     01/22/2018    CO2 27 01/22/2018    BUN 17 01/22/2018    CREATININE 0.7 01/22/2018    LABGLOM >60 01/22/2018    GLUCOSE 92 01/22/2018    PROT 8.0 01/22/2018    CALCIUM 9.4 01/22/2018    BILITOT 0.6 01/22/2018    ALKPHOS 73 01/22/2018    AST 25 01/22/2018    ALT 19 01/22/2018       POC Tests: No results for input(s): POCGLU, POCNA, POCK, POCCL, POCBUN, POCHEMO, POCHCT in the last 72 hours.     Coags: No results found for: PROTIME, INR, APTT    HCG (If Applicable): No results found for: PREGTESTUR, PREGSERUM, HCG, HCGQUANT     ABGs: No results found for: PHART, PO2ART, FAR3VYK, EDM0DHQ, BEART, R1IFRHRN     Type & Screen (If Applicable):  No results found for: [facility]    Anesthesia Evaluation  Patient summary reviewed and Nursing notes reviewed  Airway: Mallampati: II  TM distance: >3 FB   Neck ROM: full  Mouth opening: > = 3 FB Dental:          Pulmonary:normal exam    (+) COPD: no interval change,                            ROS comment: Left lung removed in 2008 d/t CA   Cardiovascular:    (+) hypertension:,       ECG reviewed               Beta Blocker:  Dose within 24 Hrs         Neuro/Psych:   Negative Neuro/Psych ROS              GI/Hepatic/Renal:   (+) GERD: well controlled, hepatitis: C,      (-) liver disease      ROS comment: Hep C treated with Harvoni. Endo/Other:    (+) hypothyroidism::., .          Pt had PAT visit. Abdominal:           Vascular: negative vascular ROS. Anesthesia Plan      general     ASA 3       Induction: intravenous. MIPS: Prophylactic antiemetics administered. Anesthetic plan and risks discussed with patient.                       Ana Luisa Sebastian CRNA   1/30/2018

## 2018-07-16 ENCOUNTER — OUTSIDE FACILITY SERVICE (OUTPATIENT)
Dept: CARDIOLOGY | Facility: CLINIC | Age: 68
End: 2018-07-16

## 2018-07-16 PROCEDURE — 93306 TTE W/DOPPLER COMPLETE: CPT | Performed by: INTERNAL MEDICINE

## 2018-07-26 ENCOUNTER — HOSPITAL ENCOUNTER (OUTPATIENT)
Dept: PAIN MANAGEMENT | Age: 68
Discharge: HOME | End: 2018-07-26

## 2018-08-31 ENCOUNTER — HOSPITAL ENCOUNTER (OUTPATIENT)
Dept: GENERAL RADIOLOGY | Age: 68
Discharge: HOME | End: 2018-08-31
Payer: OTHER MISCELLANEOUS

## 2018-08-31 ENCOUNTER — HOSPITAL ENCOUNTER (OUTPATIENT)
Dept: PAIN MANAGEMENT | Age: 68
Discharge: HOME | End: 2018-08-31
Payer: OTHER MISCELLANEOUS

## 2018-08-31 VITALS
TEMPERATURE: 97.4 F | OXYGEN SATURATION: 96 % | HEART RATE: 73 BPM | BODY MASS INDEX: 27.66 KG/M2 | DIASTOLIC BLOOD PRESSURE: 86 MMHG | RESPIRATION RATE: 18 BRPM | HEIGHT: 64 IN | SYSTOLIC BLOOD PRESSURE: 115 MMHG | WEIGHT: 162 LBS

## 2018-08-31 DIAGNOSIS — M54.42 CHRONIC MIDLINE LOW BACK PAIN WITH BILATERAL SCIATICA: ICD-10-CM

## 2018-08-31 DIAGNOSIS — M79.18 MYOFACIAL MUSCLE PAIN: ICD-10-CM

## 2018-08-31 DIAGNOSIS — M54.2 CERVICALGIA: ICD-10-CM

## 2018-08-31 DIAGNOSIS — M54.41 CHRONIC MIDLINE LOW BACK PAIN WITH BILATERAL SCIATICA: ICD-10-CM

## 2018-08-31 DIAGNOSIS — G89.29 CHRONIC MIDLINE LOW BACK PAIN WITH BILATERAL SCIATICA: ICD-10-CM

## 2018-08-31 DIAGNOSIS — M54.2 CERVICALGIA: Primary | ICD-10-CM

## 2018-08-31 DIAGNOSIS — M47.817 LUMBOSACRAL SPONDYLOSIS WITHOUT MYELOPATHY: ICD-10-CM

## 2018-08-31 PROBLEM — M54.50 CHRONIC MIDLINE LOW BACK PAIN: Status: ACTIVE | Noted: 2018-08-31

## 2018-08-31 PROCEDURE — 99204 OFFICE O/P NEW MOD 45 MIN: CPT | Performed by: NURSE PRACTITIONER

## 2018-08-31 PROCEDURE — 99205 OFFICE O/P NEW HI 60 MIN: CPT

## 2018-08-31 PROCEDURE — 72040 X-RAY EXAM NECK SPINE 2-3 VW: CPT

## 2018-08-31 RX ORDER — BACLOFEN 10 MG/1
10 TABLET ORAL 4 TIMES DAILY
COMMUNITY
End: 2018-08-31 | Stop reason: SDUPTHER

## 2018-08-31 RX ORDER — TOLTERODINE 4 MG/1
4 CAPSULE, EXTENDED RELEASE ORAL DAILY
COMMUNITY
End: 2021-01-26

## 2018-08-31 RX ORDER — NICOTINE POLACRILEX 4 MG/1
20 GUM, CHEWING ORAL DAILY
COMMUNITY
End: 2021-01-26

## 2018-08-31 RX ORDER — AMLODIPINE BESYLATE 5 MG/1
5 TABLET ORAL DAILY
COMMUNITY
End: 2021-01-26 | Stop reason: ALTCHOICE

## 2018-08-31 RX ORDER — PANTOPRAZOLE SODIUM 40 MG/1
40 TABLET, DELAYED RELEASE ORAL DAILY
COMMUNITY

## 2018-08-31 RX ORDER — CETIRIZINE HYDROCHLORIDE 10 MG/1
10 TABLET ORAL DAILY
COMMUNITY
End: 2021-01-26 | Stop reason: ALTCHOICE

## 2018-08-31 RX ORDER — LEVOTHYROXINE SODIUM 88 UG/1
88 TABLET ORAL DAILY
COMMUNITY
End: 2021-01-26

## 2018-08-31 RX ORDER — CYCLOBENZAPRINE HCL 10 MG
10 TABLET ORAL NIGHTLY PRN
COMMUNITY
End: 2018-08-31 | Stop reason: SDUPTHER

## 2018-08-31 RX ORDER — DICLOFENAC SODIUM 75 MG/1
75 TABLET, DELAYED RELEASE ORAL DAILY
COMMUNITY
End: 2021-01-26 | Stop reason: ALTCHOICE

## 2018-08-31 RX ORDER — FLUTICASONE PROPIONATE 50 MCG
1 SPRAY, SUSPENSION (ML) NASAL 2 TIMES DAILY
COMMUNITY
End: 2021-08-30 | Stop reason: ALTCHOICE

## 2018-08-31 RX ORDER — SUCRALFATE 1 G/1
1 TABLET ORAL 3 TIMES DAILY
COMMUNITY
End: 2021-01-26

## 2018-08-31 RX ORDER — TIZANIDINE 4 MG/1
TABLET ORAL
Qty: 60 TABLET | Refills: 1 | Status: SHIPPED | OUTPATIENT
Start: 2018-08-31

## 2018-08-31 RX ORDER — ALBUTEROL SULFATE 90 UG/1
AEROSOL, METERED RESPIRATORY (INHALATION)
COMMUNITY
End: 2021-08-30 | Stop reason: ALTCHOICE

## 2018-08-31 RX ORDER — AMITRIPTYLINE HYDROCHLORIDE 25 MG/1
25 TABLET, FILM COATED ORAL NIGHTLY
COMMUNITY
End: 2021-01-26 | Stop reason: ALTCHOICE

## 2018-08-31 ASSESSMENT — PAIN DESCRIPTION - ORIENTATION: ORIENTATION: LOWER;MID

## 2018-08-31 ASSESSMENT — ENCOUNTER SYMPTOMS
SORE THROAT: 0
CONSTIPATION: 0
BACK PAIN: 1
SHORTNESS OF BREATH: 0
WHEEZING: 0
BLURRED VISION: 0

## 2018-08-31 ASSESSMENT — PAIN DESCRIPTION - ONSET: ONSET: ON-GOING

## 2018-08-31 ASSESSMENT — PAIN DESCRIPTION - FREQUENCY: FREQUENCY: CONTINUOUS

## 2018-08-31 ASSESSMENT — PAIN SCALES - GENERAL: PAINLEVEL_OUTOF10: 6

## 2018-08-31 ASSESSMENT — PAIN DESCRIPTION - PAIN TYPE: TYPE: CHRONIC PAIN

## 2018-08-31 ASSESSMENT — PAIN DESCRIPTION - LOCATION: LOCATION: BACK

## 2018-08-31 ASSESSMENT — ACTIVITIES OF DAILY LIVING (ADL): EFFECT OF PAIN ON DAILY ACTIVITIES: LIMITS ACTIVITY

## 2018-08-31 ASSESSMENT — PAIN DESCRIPTION - PROGRESSION: CLINICAL_PROGRESSION: GRADUALLY WORSENING
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
## 2018-09-05 ENCOUNTER — TELEPHONE (OUTPATIENT)
Dept: PAIN MANAGEMENT | Age: 68
End: 2018-09-05

## 2018-09-05 DIAGNOSIS — M51.36 DDD (DEGENERATIVE DISC DISEASE), LUMBAR: Primary | ICD-10-CM

## 2018-09-05 NOTE — TELEPHONE ENCOUNTER
Received call from [facility] related to approval for Lumbar Epidural Injection per Jon Cedeno DNP request. Per Dr. Yenni Grijalva patient is to have INR and platelet count completed prior to injection. As long as counts are ok, patient is ok to have procedure.

## 2018-09-06 ENCOUNTER — OUTSIDE FACILITY SERVICE (OUTPATIENT)
Dept: CARDIOLOGY | Facility: CLINIC | Age: 68
End: 2018-09-06

## 2018-09-06 PROCEDURE — 78452 HT MUSCLE IMAGE SPECT MULT: CPT | Performed by: INTERNAL MEDICINE

## 2018-09-06 PROCEDURE — 93018 CV STRESS TEST I&R ONLY: CPT | Performed by: INTERNAL MEDICINE

## 2018-09-13 ENCOUNTER — TELEPHONE (OUTPATIENT)
Dept: VASCULAR SURGERY | Facility: CLINIC | Age: 68
End: 2018-09-13

## 2018-09-13 NOTE — TELEPHONE ENCOUNTER
Left message reminding Ms Palomares of her appointment for Friday, September 14th, 2018 at 1045 am with Sofía WEBB. Also advised if she had any questions or needed to reschedule to please call the office at [phone].

## 2018-09-28 ENCOUNTER — HOSPITAL ENCOUNTER (OUTPATIENT)
Dept: PAIN MANAGEMENT | Age: 68
Discharge: HOME | End: 2018-09-28
Payer: OTHER MISCELLANEOUS

## 2018-09-28 VITALS
HEART RATE: 63 BPM | SYSTOLIC BLOOD PRESSURE: 132 MMHG | TEMPERATURE: 97.2 F | OXYGEN SATURATION: 100 % | HEIGHT: 64 IN | WEIGHT: 167 LBS | RESPIRATION RATE: 18 BRPM | BODY MASS INDEX: 28.51 KG/M2 | DIASTOLIC BLOOD PRESSURE: 77 MMHG

## 2018-09-28 PROCEDURE — 2500000003 HC RX 250 WO HCPCS

## 2018-09-28 PROCEDURE — 20553 NJX 1/MLT TRIGGER POINTS 3/>: CPT

## 2018-09-28 RX ORDER — LIDOCAINE HYDROCHLORIDE 10 MG/ML
INJECTION, SOLUTION EPIDURAL; INFILTRATION; INTRACAUDAL; PERINEURAL
Status: COMPLETED | OUTPATIENT
Start: 2018-09-28 | End: 2018-09-28

## 2018-09-28 RX ORDER — BUPIVACAINE HYDROCHLORIDE 5 MG/ML
INJECTION, SOLUTION EPIDURAL; INTRACAUDAL
Status: COMPLETED | OUTPATIENT
Start: 2018-09-28 | End: 2018-09-28

## 2018-09-28 RX ADMIN — LIDOCAINE HYDROCHLORIDE 10 ML: 10 INJECTION, SOLUTION EPIDURAL; INFILTRATION; INTRACAUDAL; PERINEURAL at 10:43

## 2018-09-28 RX ADMIN — BUPIVACAINE HYDROCHLORIDE 10 ML: 5 INJECTION, SOLUTION EPIDURAL; INTRACAUDAL at 10:44

## 2018-09-28 ASSESSMENT — PAIN DESCRIPTION - LOCATION: LOCATION: NECK

## 2018-09-28 ASSESSMENT — PAIN DESCRIPTION - ORIENTATION: ORIENTATION: UPPER

## 2018-09-28 ASSESSMENT — PAIN DESCRIPTION - DESCRIPTORS: DESCRIPTORS: CONSTANT;ACHING;BURNING

## 2018-09-28 ASSESSMENT — PAIN DESCRIPTION - PAIN TYPE: TYPE: CHRONIC PAIN

## 2018-09-28 ASSESSMENT — PAIN SCALES - GENERAL: PAINLEVEL_OUTOF10: 7

## 2018-09-28 ASSESSMENT — PAIN DESCRIPTION - PROGRESSION: CLINICAL_PROGRESSION: NOT CHANGED

## 2018-09-28 ASSESSMENT — PAIN DESCRIPTION - FREQUENCY: FREQUENCY: CONTINUOUS

## 2018-09-28 ASSESSMENT — PAIN DESCRIPTION - ONSET: ONSET: ON-GOING

## 2018-09-28 NOTE — PROGRESS NOTES
Procedure:  Level of Consciousness: [x]Alert [x]Oriented []Disoriented []Lethargic  Anxiety Level: [x]Calm []Anxious []Depressed []Other  Skin: [x]Warm [x]Dry []Cool []Moist []Intact []Other  Cardiovascular: [x]Palpitations: [x]Never []Occasionally []Frequently  Chest Pain: [x]No []Yes  Respiratory:  [x]Unlabored []Labored []Cough ([] Productive []Unproductive)  HCG Required: [x]No []Yes   Results: []Negative []Positive  Knowledge Level:        [x]Patient/Other verbalized understanding of pre-procedure instructions. [x]Assessment of post-op care needs (transportation, responsible caregiver)        [x]Able to discuss health care problems and how to deal with it.   Factors that Affect Teaching:        Language Barrier: [x]No []Yes - why:        Hearing Loss:        [x]No []Yes            Corrective Device:  []Yes [x]No        Vision Loss:           [x]No []Yes            Corrective Device:  []Yes [x]No        Memory Loss:       [x]No []Yes            []Short Term []Long Term  Motivational Level:  [x]Asks Questions                  []Extremely Anxious       [x]Seems Interested               []Seems Uninterested                  []Denies need for Education  Risk for Injury:  [x]Patient oriented to person, place and time  []History of frequent falls/loss of balance  Nutritional:  []Change in appetite   []Weight Gain   []Weight Loss  Functional:  []Requires assistance with ADL's

## 2018-10-01 ENCOUNTER — TELEPHONE (OUTPATIENT)
Dept: PAIN MANAGEMENT | Age: 68
End: 2018-10-01

## 2018-10-01 NOTE — TELEPHONE ENCOUNTER
Patient had a procedure with Dr. Jarrett Tyson on 9/28/18     How are you feeling? Did alright, sore for a few days. Had a headache for a few days and had a headache the day of. Didn't get relief from that and had expected to. What is your pain score, on a scale from 1-10? Prior to procedure: 9  Now: 7     Do you feel like Dr. Jarrett Tyson got to the source of your pain? Yes, but when she feels of those spots they are still sore and underneath is pretty tender. Did it relieve 80% or more of your pain? Yeah    If it is back pain, does it stay in your back, go down one leg, or both?  N/A

## 2018-10-03 ENCOUNTER — TELEPHONE (OUTPATIENT)
Dept: PAIN MANAGEMENT | Age: 68
End: 2018-10-03

## 2018-10-03 NOTE — TELEPHONE ENCOUNTER
Called patient to explain to arrive at [address] [place] [address] for lab draw. She verbalized understanding of this and stated that she would.

## 2018-10-03 NOTE — TELEPHONE ENCOUNTER
Patient called nurse line stating that she has developed a \"lump\" beneath her ear, and that she feels her neck is swollen. Patient had Cervical Trigger Point injections on 9/28. This office made contact with her yesterday in follow up. She stated at the time we called that the swelling was not present, but has come up since then. Patient is also scheduled for a LESI on 9/5. Spoke with Ana Luisa to see if he wants Dr. Nica Barry to follow up at patient's procedure appointment. He stated to notify Dr. Nica Barry to see how he would like to proceed. Call placed to Dr. Gordon Madden office and spoke with his nurse. She instructed to bring patient in prior to Friday appointment for eval if possible. Spoke with patient. She is unable to come in today, but will come in tomorrow morning. Scheduled patient for a 9:40 appointment and instructed her to register downstairs between 9-9:15 am prior to coming to third floor.

## 2018-10-04 ENCOUNTER — TELEPHONE (OUTPATIENT)
Dept: PAIN MANAGEMENT | Age: 68
End: 2018-10-04

## 2018-10-04 ENCOUNTER — OFFICE VISIT (OUTPATIENT)
Dept: OTOLARYNGOLOGY | Age: 68
End: 2018-10-04
Payer: MEDICARE

## 2018-10-04 ENCOUNTER — HOSPITAL ENCOUNTER (OUTPATIENT)
Dept: PAIN MANAGEMENT | Age: 68
Discharge: HOME | End: 2018-10-04
Payer: OTHER MISCELLANEOUS

## 2018-10-04 VITALS
HEIGHT: 64 IN | BODY MASS INDEX: 27.66 KG/M2 | HEART RATE: 66 BPM | OXYGEN SATURATION: 96 % | WEIGHT: 162 LBS | SYSTOLIC BLOOD PRESSURE: 112 MMHG | RESPIRATION RATE: 18 BRPM | TEMPERATURE: 98 F | DIASTOLIC BLOOD PRESSURE: 78 MMHG

## 2018-10-04 VITALS
SYSTOLIC BLOOD PRESSURE: 135 MMHG | DIASTOLIC BLOOD PRESSURE: 85 MMHG | RESPIRATION RATE: 16 BRPM | BODY MASS INDEX: 28.51 KG/M2 | HEART RATE: 62 BPM | OXYGEN SATURATION: 96 % | WEIGHT: 167 LBS | HEIGHT: 64 IN

## 2018-10-04 DIAGNOSIS — M26.69 TMJ CAPSULITIS: ICD-10-CM

## 2018-10-04 DIAGNOSIS — L04.9 ACUTE LYMPHADENITIS: ICD-10-CM

## 2018-10-04 DIAGNOSIS — H61.892 SWELLING OF EXTERNAL EAR, LEFT: ICD-10-CM

## 2018-10-04 DIAGNOSIS — M79.18 MYOFASCIAL PAIN: Primary | ICD-10-CM

## 2018-10-04 PROCEDURE — G8484 FLU IMMUNIZE NO ADMIN: HCPCS | Performed by: OTOLARYNGOLOGY

## 2018-10-04 PROCEDURE — 99212 OFFICE O/P EST SF 10 MIN: CPT

## 2018-10-04 PROCEDURE — 1090F PRES/ABSN URINE INCON ASSESS: CPT | Performed by: OTOLARYNGOLOGY

## 2018-10-04 PROCEDURE — 1036F TOBACCO NON-USER: CPT | Performed by: OTOLARYNGOLOGY

## 2018-10-04 PROCEDURE — G8427 DOCREV CUR MEDS BY ELIG CLIN: HCPCS | Performed by: OTOLARYNGOLOGY

## 2018-10-04 PROCEDURE — 1101F PT FALLS ASSESS-DOCD LE1/YR: CPT | Performed by: OTOLARYNGOLOGY

## 2018-10-04 PROCEDURE — 3017F COLORECTAL CA SCREEN DOC REV: CPT | Performed by: OTOLARYNGOLOGY

## 2018-10-04 PROCEDURE — 99213 OFFICE O/P EST LOW 20 MIN: CPT | Performed by: NURSE PRACTITIONER

## 2018-10-04 PROCEDURE — 4040F PNEUMOC VAC/ADMIN/RCVD: CPT | Performed by: OTOLARYNGOLOGY

## 2018-10-04 PROCEDURE — G8419 CALC BMI OUT NRM PARAM NOF/U: HCPCS | Performed by: OTOLARYNGOLOGY

## 2018-10-04 PROCEDURE — 1123F ACP DISCUSS/DSCN MKR DOCD: CPT | Performed by: OTOLARYNGOLOGY

## 2018-10-04 PROCEDURE — 99203 OFFICE O/P NEW LOW 30 MIN: CPT | Performed by: OTOLARYNGOLOGY

## 2018-10-04 PROCEDURE — G8400 PT W/DXA NO RESULTS DOC: HCPCS | Performed by: OTOLARYNGOLOGY

## 2018-10-04 RX ORDER — AMOXICILLIN AND CLAVULANATE POTASSIUM 875; 125 MG/1; MG/1
1 TABLET, FILM COATED ORAL 2 TIMES DAILY
Qty: 20 TABLET | Refills: 0 | Status: SHIPPED | OUTPATIENT
Start: 2018-10-04 | End: 2018-10-14

## 2018-10-04 ASSESSMENT — PAIN DESCRIPTION - PROGRESSION: CLINICAL_PROGRESSION: GRADUALLY WORSENING

## 2018-10-04 ASSESSMENT — ENCOUNTER SYMPTOMS
SORE THROAT: 0
SHORTNESS OF BREATH: 0
WHEEZING: 0
TROUBLE SWALLOWING: 0
CONSTIPATION: 0
BLURRED VISION: 0

## 2018-10-04 ASSESSMENT — PAIN DESCRIPTION - ONSET: ONSET: ON-GOING

## 2018-10-04 ASSESSMENT — PAIN DESCRIPTION - PAIN TYPE: TYPE: ACUTE PAIN

## 2018-10-04 ASSESSMENT — PAIN SCALES - GENERAL: PAINLEVEL_OUTOF10: 9

## 2018-10-04 ASSESSMENT — PAIN DESCRIPTION - LOCATION: LOCATION: NECK

## 2018-10-04 ASSESSMENT — PAIN DESCRIPTION - FREQUENCY: FREQUENCY: CONTINUOUS

## 2018-10-04 ASSESSMENT — PAIN DESCRIPTION - ORIENTATION: ORIENTATION: LEFT

## 2018-10-04 ASSESSMENT — PAIN DESCRIPTION - DESCRIPTORS: DESCRIPTORS: SHARP;CONSTANT

## 2018-10-04 NOTE — TELEPHONE ENCOUNTER
Called Dr. Mcclain Citizen office and spoke with office member and got patient appointment with Dr. Mariza Heath at 2:00pm today, due to APRN assessing patient and possible mastoiditis related to swelling in neck. Patient had asked for a referral to Dr. Kim Slaughter, ENT at [facility] however I informed patient due to insurance restrictions and being under auto claim for pain management, we cannot make this referral but that patient needs to be assess by PCP and if she believes she needs referral to ENT that will be up to her discretion. Informed patient of this in office.

## 2018-10-04 NOTE — PROGRESS NOTES
[facility] Physical & Pain Medicine  Office Note    Patient Name: Candy Ruiz    MR #: [id number]    Account #: [de-identified]    : 1950    Age: 80 y.o. Sex: female    Date: 2018    PCP: Marielos Esposito DO    Chief Complaint:   Chief Complaint   Patient presents with    Other     swelling let neck just below ear lobe post trigger points       History of Present Illness:     Candy Ruiz is a 80 y.o. female who presents to the office for follow-up of cervical TPI  injections on 2018. Patient reports cervical trigger points were effective . Patient reports  started having \"I knowledgeable under left ear base\". I've examined cervical paraspinous muscles with no noted redness or swelling. Patient with \"swollen\" nodule anterior left ear base and TMJ area- scm tenderness. Area minor redness and tender to touch. Patient reports had tubes placed ENT 2 years ago. I've encouraged patient to follow up immediately after this office visit with primary care/ENT/urgent care possibility of  lymp node swelling, unsure of mastoiditis. Neck Pain    This is a chronic problem. The current episode started more than 1 year ago. The problem occurs daily. The problem has been unchanged. The pain is present in the left side, midline and right side. The quality of the pain is described as aching. The pain is mild. The symptoms are aggravated by twisting and stress. The pain is same all the time. Stiffness is present all day. Pertinent negatives include no chest pain, fever ( She believes has had a slight fever since nodule in left ear base has appeared ) or trouble swallowing. Associated symptoms comments: Swelling to left anterior base ear and left TMJ.        Current Pain Assement  Pain Assessment  Pain Assessment: 0-10  Pain Level: 9  Pain Type: Acute pain  Pain Location: Neck  Pain Orientation: Left  Pain Descriptors: Sharp, Constant (started )  Pain Frequency: Continuous  Pain Onset: normal. There is swelling ( Anterior base) and tenderness. No drainage. No decreased hearing is noted. Swelling nodule into left anterior base tender to touch   Eyes: Pupils are equal, round, and reactive to light. Conjunctivae and EOM are normal.   Neck: Neck supple. Pulmonary/Chest: Effort normal and breath sounds normal.   Abdominal: Soft. Bowel sounds are normal.   Musculoskeletal:        Cervical back: She exhibits tenderness. Neurological: She is alert and oriented to person, place, and time. Skin: Skin is warm and dry. She is not diaphoretic. Psychiatric: She has a normal mood and affect. Her behavior is normal. Judgment and thought content normal.   Nursing note and vitals reviewed. Active Problems:    Chronic midline low back pain    Lumbosacral spondylosis without myelopathy    Cervicalgia    Myofascial muscle pain    Swelling of external ear, left  Resolved Problems:    * No resolved hospital problems. *       Plan:  1. Pt with \"swollen left anterior ear external and TMJ tenderness\"- Pt reports \"this started Sunday\". Pt reports \"had ear tubes placed 2 years ago- Dr. Anahy Christina". Pt reports \"no swimming and no recent Upper Resp symptoms\"  2. Cancel LESI injection for tomorrow. 3. Seek immediate PCP/ENT/or Urgent when leaving office today-may need to be worked up for anterior left ear lymph node swelling, unsure of mastoiditis. 4. Office has set up primary care appointment today. [x] Will return to the office in   [] 1 month   [] 4 - 6 weeks   [x] 2 months   [] 3 months for:  [] Planned Procedure   [] Procedure Follow-up   [] Office Visit  [] Medication Changes    Discussion: Discussed exam findings and plan of care with patient. Patient agreed with POC and questions were asked and answered. I discussed and examined patient with sister and room. Discussed canceling LESI injection for tomorrow related to possibility of left ear lymph node swelling - unsure of infection or mastoiditis. Patient does report she had ear tubes placed 2 years ago Dr. Laith Alejandre. I have encouraged patient to follow-up today with primary care ENT or urgent care with the possibility of starting on antibiotics. I did have my office set up a primary care appointment today. Patient may need to be worked up for  anterior left ear lymph node swelling and TMJ tenderness. Patient and family very thankful for visit and states \"I'm glad we came the 1st\". Activity: discussed exercise as beneficial to pain reduction, encouraged stretching exercise with a focus on torso strengthening. Education Provided:  [x] Review of Sia Gut [x] Agreement Review [] ORT Score  [] Review of Test  [x] Compliance Issues Discussed [] Cognitive Behavior Needs [x] Exercise  [] Financial Issues [x] Teaching  []  Smoking Cessation    Controlled Substance Monitoring:   Discussed with patient possible medication side effects, risk of tolerance, dependence and alternative treatments. Discussed the growing epidemic in the [place] with the overprescribing and at times the abuse of narcotics. Discussed the detrimental effects of long term narcotic use in younger patients. Patient encouraged to set daily goals of exercising and if on narcotics decreasing daily narcotic intake. Discussed with the patient about the development of hyperalgesia with long term narcotic intake.      CC:  DO Ag Reaves APRN, 10/4/2018 at 6:39 PM

## 2018-10-04 NOTE — PROGRESS NOTES
Nursing Admission Record    Current Issues / Falls / ER Visits:  No    Percentage of Pain Relief after Last Procedure:  0 %    How long lasted:  0 days    Radiology exams received during the last 12 months: Yes       When                                               Where [facility]       Imaging on chart: Yes         Imaging records requested: Yes  MRI exams received in the past 2 years:  Yes  Physical therapy during the last 6 months: No       When:                                              Where  Labs during the last 12 months: Yes    Education Provided:  [x] Review of Qamar Anger  [] Agreement Review  [] Compliance Issues Discussed    [] Cognitive Behavior Needs [x] Exercise [] Review of Test [] Financial Issues                                                                       PEG Score    1. What number best describes you pain on average in the past week? 0           1          2         3         4        5        6         7       8         9     10   No Pain                                                                                               Pain as bad as you can imagine              Score_10__________    2. What number best describes how, during the past week, pain has interfered with you enjoyment of life? 0           1           2         3          4       5        6        7        8         9      10   Does not                                                                                              Completely     interfere                                                                                                 interferes            Score_10__________    3. What number best describes how, during the past week, pain has interfered with you general activity?        0     1               2           3          4         5        6        7       8         9       10   Does not Completely    interfere                                                                                                  interferes           Score____10_______                                                                                Total Score ÷ 3 = Score                                                                                                 Score__3______                                                                                            [x] Tobacco/Alcohol Use [x] Teaching [] New Patient [] Picture Obtained    Physician Plan:  [] Outgoing Referral  [] Pharmacy Consult  [] Test Ordered   [] Obtained Test Results / Consult Notes  [] UDS due at next visit, verified per EPIC      [] Suspected Physical Abuse or Suicide Risk assessed - IF YES COMPLETE QUESTIONS BELOW    If any of the following questions are answered yes - contact attending physician for referral:    Has been considering harming self to escape stress, pain problems? [] YES  [] NO  Has a suicide plan? [] YES  [] NO  Has attempted suicide in the past?   [] YES  [] NO  Has a close friend or family member who committed suicide? [] YES  [] NO    Patient Referred To :      Additional Notes:    Assessment Completed by:  Electronically signed by Vincenzo Hudson RN on 10/4/2018 at 9:54 AM

## 2018-10-05 ENCOUNTER — TELEPHONE (OUTPATIENT)
Dept: PAIN MANAGEMENT | Age: 68
End: 2018-10-05

## 2018-10-15 ENCOUNTER — TELEPHONE (OUTPATIENT)
Dept: PAIN MANAGEMENT | Age: 68
End: 2018-10-15

## 2018-10-18 ENCOUNTER — TELEPHONE (OUTPATIENT)
Dept: VASCULAR SURGERY | Facility: CLINIC | Age: 68
End: 2018-10-18

## 2018-10-18 NOTE — TELEPHONE ENCOUNTER
Left message reminding Ms Palomares of her appointment for Friday, October 19th, 2018 at 130 pm with Sofía WEBB. Also advised Ms Palomares if she would like to move her appointment up we have some availability. Also advised if she had any questions or needed to reschedule to please call the office at [phone].

## 2018-12-27 ENCOUNTER — APPOINTMENT (OUTPATIENT)
Dept: GENERAL RADIOLOGY | Facility: HOSPITAL | Age: 68
End: 2018-12-27

## 2018-12-27 ENCOUNTER — HOSPITAL ENCOUNTER (OUTPATIENT)
Facility: HOSPITAL | Age: 68
Setting detail: OBSERVATION
Discharge: HOME | End: 2018-12-28
Attending: FAMILY MEDICINE | Admitting: FAMILY MEDICINE

## 2018-12-27 PROBLEM — R07.89 CHEST PAIN, ATYPICAL: Status: ACTIVE | Noted: 2018-12-27

## 2018-12-27 PROBLEM — Z85.118 HISTORY OF LUNG CANCER: Status: ACTIVE | Noted: 2018-12-27

## 2018-12-27 PROBLEM — Z90.2 S/P LOBECTOMY OF LUNG: Status: ACTIVE | Noted: 2018-12-27

## 2018-12-27 PROBLEM — R07.9 CHEST PAIN: Status: ACTIVE | Noted: 2018-12-27

## 2018-12-27 LAB — TROPONIN I SERPL-MCNC: <0.012 NG/ML (ref 0–0.03)

## 2018-12-27 PROCEDURE — G0378 HOSPITAL OBSERVATION PER HR: HCPCS

## 2018-12-27 PROCEDURE — 94799 UNLISTED PULMONARY SVC/PX: CPT

## 2018-12-27 PROCEDURE — 96361 HYDRATE IV INFUSION ADD-ON: CPT

## 2018-12-27 PROCEDURE — 96374 THER/PROPH/DIAG INJ IV PUSH: CPT

## 2018-12-27 PROCEDURE — 25810000003 SODIUM CHLORIDE 0.9 % WITH KCL 20 MEQ 20-0.9 MEQ/L-% SOLUTION: Performed by: FAMILY MEDICINE

## 2018-12-27 PROCEDURE — 94760 N-INVAS EAR/PLS OXIMETRY 1: CPT

## 2018-12-27 PROCEDURE — 84484 ASSAY OF TROPONIN QUANT: CPT | Performed by: FAMILY MEDICINE

## 2018-12-27 PROCEDURE — 94640 AIRWAY INHALATION TREATMENT: CPT

## 2018-12-27 PROCEDURE — 71046 X-RAY EXAM CHEST 2 VIEWS: CPT

## 2018-12-27 PROCEDURE — G0379 DIRECT REFER HOSPITAL OBSERV: HCPCS

## 2018-12-27 RX ORDER — ASPIRIN 81 MG/1
81 TABLET ORAL DAILY
Status: DISCONTINUED | OUTPATIENT
Start: 2018-12-28 | End: 2018-12-28 | Stop reason: HOSPADM

## 2018-12-27 RX ORDER — ONDANSETRON 2 MG/ML
4 INJECTION INTRAMUSCULAR; INTRAVENOUS EVERY 6 HOURS PRN
Status: DISCONTINUED | OUTPATIENT
Start: 2018-12-27 | End: 2018-12-28 | Stop reason: HOSPADM

## 2018-12-27 RX ORDER — SODIUM CHLORIDE AND POTASSIUM CHLORIDE 150; 900 MG/100ML; MG/100ML
75 INJECTION, SOLUTION INTRAVENOUS CONTINUOUS
Status: DISCONTINUED | OUTPATIENT
Start: 2018-12-27 | End: 2018-12-28 | Stop reason: HOSPADM

## 2018-12-27 RX ORDER — SUCRALFATE 1 G/1
1 TABLET ORAL 3 TIMES DAILY
COMMUNITY
End: 2021-01-09

## 2018-12-27 RX ORDER — PANTOPRAZOLE SODIUM 40 MG/1
40 TABLET, DELAYED RELEASE ORAL DAILY
Status: DISCONTINUED | OUTPATIENT
Start: 2018-12-27 | End: 2018-12-28 | Stop reason: HOSPADM

## 2018-12-27 RX ORDER — AMLODIPINE BESYLATE 5 MG/1
5 TABLET ORAL
Status: DISCONTINUED | OUTPATIENT
Start: 2018-12-27 | End: 2018-12-28 | Stop reason: HOSPADM

## 2018-12-27 RX ORDER — PANTOPRAZOLE SODIUM 40 MG/1
40 TABLET, DELAYED RELEASE ORAL DAILY
COMMUNITY
End: 2021-01-09

## 2018-12-27 RX ORDER — SODIUM CHLORIDE 0.9 % (FLUSH) 0.9 %
3-10 SYRINGE (ML) INJECTION AS NEEDED
Status: DISCONTINUED | OUTPATIENT
Start: 2018-12-27 | End: 2018-12-28 | Stop reason: HOSPADM

## 2018-12-27 RX ORDER — LEVOTHYROXINE SODIUM 88 UG/1
88 TABLET ORAL
Status: DISCONTINUED | OUTPATIENT
Start: 2018-12-28 | End: 2018-12-28 | Stop reason: HOSPADM

## 2018-12-27 RX ORDER — SODIUM CHLORIDE 0.9 % (FLUSH) 0.9 %
3 SYRINGE (ML) INJECTION EVERY 12 HOURS SCHEDULED
Status: DISCONTINUED | OUTPATIENT
Start: 2018-12-27 | End: 2018-12-28 | Stop reason: HOSPADM

## 2018-12-27 RX ORDER — IPRATROPIUM BROMIDE AND ALBUTEROL SULFATE 2.5; .5 MG/3ML; MG/3ML
3 SOLUTION RESPIRATORY (INHALATION)
Status: DISCONTINUED | OUTPATIENT
Start: 2018-12-27 | End: 2018-12-28 | Stop reason: HOSPADM

## 2018-12-27 RX ORDER — TOLTERODINE 4 MG/1
4 CAPSULE, EXTENDED RELEASE ORAL DAILY
COMMUNITY
End: 2018-12-28 | Stop reason: HOSPADM

## 2018-12-27 RX ORDER — KETOROLAC TROMETHAMINE 30 MG/ML
30 INJECTION, SOLUTION INTRAMUSCULAR; INTRAVENOUS EVERY 6 HOURS PRN
Status: DISCONTINUED | OUTPATIENT
Start: 2018-12-27 | End: 2018-12-28 | Stop reason: HOSPADM

## 2018-12-27 RX ORDER — FAMOTIDINE 10 MG/ML
20 INJECTION, SOLUTION INTRAVENOUS 2 TIMES DAILY
Status: DISCONTINUED | OUTPATIENT
Start: 2018-12-27 | End: 2018-12-28 | Stop reason: HOSPADM

## 2018-12-27 RX ADMIN — SODIUM CHLORIDE, PRESERVATIVE FREE 3 ML: 5 INJECTION INTRAVENOUS at 20:53

## 2018-12-27 RX ADMIN — IPRATROPIUM BROMIDE AND ALBUTEROL SULFATE 3 ML: 2.5; .5 SOLUTION RESPIRATORY (INHALATION) at 19:42

## 2018-12-27 RX ADMIN — POTASSIUM CHLORIDE AND SODIUM CHLORIDE 75 ML/HR: 900; 150 INJECTION, SOLUTION INTRAVENOUS at 20:52

## 2018-12-27 RX ADMIN — PANTOPRAZOLE SODIUM 40 MG: 40 TABLET, DELAYED RELEASE ORAL at 20:55

## 2018-12-27 RX ADMIN — METOPROLOL TARTRATE 25 MG: 25 TABLET, FILM COATED ORAL at 20:52

## 2018-12-27 RX ADMIN — FAMOTIDINE 20 MG: 10 INJECTION, SOLUTION INTRAVENOUS at 20:52

## 2018-12-27 RX ADMIN — AMLODIPINE BESYLATE 5 MG: 5 TABLET ORAL at 20:52

## 2018-12-28 ENCOUNTER — APPOINTMENT (OUTPATIENT)
Dept: CARDIOLOGY | Facility: HOSPITAL | Age: 68
End: 2018-12-28
Attending: FAMILY MEDICINE

## 2018-12-28 VITALS
OXYGEN SATURATION: 98 % | BODY MASS INDEX: 32.44 KG/M2 | TEMPERATURE: 97.5 F | WEIGHT: 190.04 LBS | HEIGHT: 64 IN | HEART RATE: 61 BPM | DIASTOLIC BLOOD PRESSURE: 61 MMHG | RESPIRATION RATE: 18 BRPM | SYSTOLIC BLOOD PRESSURE: 110 MMHG

## 2018-12-28 LAB
ALBUMIN SERPL-MCNC: 3.8 G/DL (ref 3.5–5)
ALBUMIN/GLOB SERPL: 1.3 G/DL (ref 1.1–2.5)
ALP SERPL-CCNC: 64 U/L (ref 24–120)
ALT SERPL W P-5'-P-CCNC: 32 U/L (ref 0–54)
ANION GAP SERPL CALCULATED.3IONS-SCNC: 13 MMOL/L (ref 4–13)
ARTICHOKE IGE QN: 82 MG/DL (ref 0–99)
AST SERPL-CCNC: 27 U/L (ref 7–45)
BASOPHILS # BLD AUTO: 0.02 10*3/MM3 (ref 0–0.2)
BASOPHILS NFR BLD AUTO: 0.5 % (ref 0–2)
BH CV STRESS ATROPINE STAGE 5: 2
BH CV STRESS BP STAGE 1: NORMAL
BH CV STRESS BP STAGE 2: NORMAL
BH CV STRESS BP STAGE 3: NORMAL
BH CV STRESS BP STAGE 4: NORMAL
BH CV STRESS BP STAGE 5: NORMAL
BH CV STRESS DOSE DOBUTAMINE STAGE 1: 10
BH CV STRESS DOSE DOBUTAMINE STAGE 2: 20
BH CV STRESS DOSE DOBUTAMINE STAGE 3: 30
BH CV STRESS DOSE DOBUTAMINE STAGE 4: 40
BH CV STRESS DOSE DOBUTAMINE STAGE 5: 50
BH CV STRESS DURATION MIN STAGE 1: 3
BH CV STRESS DURATION MIN STAGE 2: 3
BH CV STRESS DURATION MIN STAGE 3: 3
BH CV STRESS DURATION MIN STAGE 4: 3
BH CV STRESS DURATION MIN STAGE 5: 2
BH CV STRESS DURATION SEC STAGE 1: 0
BH CV STRESS DURATION SEC STAGE 2: 0
BH CV STRESS DURATION SEC STAGE 3: 0
BH CV STRESS DURATION SEC STAGE 4: 0
BH CV STRESS DURATION SEC STAGE 5: 21
BH CV STRESS HR STAGE 1: 62
BH CV STRESS HR STAGE 2: 65
BH CV STRESS HR STAGE 3: 89
BH CV STRESS HR STAGE 4: 100
BH CV STRESS HR STAGE 5: 110
BH CV STRESS PROTOCOL 1: NORMAL
BH CV STRESS RECOVERY BP: NORMAL MMHG
BH CV STRESS RECOVERY HR: 79 BPM
BH CV STRESS STAGE 1: 1
BH CV STRESS STAGE 2: 2
BH CV STRESS STAGE 3: 3
BH CV STRESS STAGE 4: 4
BH CV STRESS STAGE 5: 5
BILIRUB SERPL-MCNC: 0.4 MG/DL (ref 0.1–1)
BUN BLD-MCNC: 15 MG/DL (ref 5–21)
BUN/CREAT SERPL: 22.7 (ref 7–25)
CALCIUM SPEC-SCNC: 8.9 MG/DL (ref 8.4–10.4)
CHLORIDE SERPL-SCNC: 102 MMOL/L (ref 98–110)
CHOLEST SERPL-MCNC: 123 MG/DL (ref 130–200)
CO2 SERPL-SCNC: 27 MMOL/L (ref 24–31)
CREAT BLD-MCNC: 0.66 MG/DL (ref 0.5–1.4)
DEPRECATED RDW RBC AUTO: 45.2 FL (ref 40–54)
EOSINOPHIL # BLD AUTO: 0.12 10*3/MM3 (ref 0–0.7)
EOSINOPHIL NFR BLD AUTO: 3.1 % (ref 0–4)
ERYTHROCYTE [DISTWIDTH] IN BLOOD BY AUTOMATED COUNT: 13.3 % (ref 12–15)
GFR SERPL CREATININE-BSD FRML MDRD: 108 ML/MIN/1.73
GFR SERPL CREATININE-BSD FRML MDRD: 89 ML/MIN/1.73
GLOBULIN UR ELPH-MCNC: 3 GM/DL
GLUCOSE BLD-MCNC: 119 MG/DL (ref 70–100)
HBA1C MFR BLD: 5.1 %
HCT VFR BLD AUTO: 31.9 % (ref 37–47)
HDLC SERPL-MCNC: 44 MG/DL
HGB BLD-MCNC: 10.7 G/DL (ref 12–16)
IMM GRANULOCYTES # BLD AUTO: 0.01 10*3/MM3 (ref 0–0.03)
IMM GRANULOCYTES NFR BLD AUTO: 0.3 % (ref 0–5)
LDLC/HDLC SERPL: 1.41 {RATIO}
LYMPHOCYTES # BLD AUTO: 1.12 10*3/MM3 (ref 0.72–4.86)
LYMPHOCYTES NFR BLD AUTO: 28.8 % (ref 15–45)
MAGNESIUM SERPL-MCNC: 1.7 MG/DL (ref 1.4–2.2)
MAXIMAL PREDICTED HEART RATE: 153 BPM
MCH RBC QN AUTO: 31.1 PG (ref 28–32)
MCHC RBC AUTO-ENTMCNC: 33.5 G/DL (ref 33–36)
MCV RBC AUTO: 92.7 FL (ref 82–98)
MONOCYTES # BLD AUTO: 0.42 10*3/MM3 (ref 0.19–1.3)
MONOCYTES NFR BLD AUTO: 10.8 % (ref 4–12)
NEUTROPHILS # BLD AUTO: 2.2 10*3/MM3 (ref 1.87–8.4)
NEUTROPHILS NFR BLD AUTO: 56.5 % (ref 39–78)
NRBC BLD AUTO-RTO: 0 /100 WBC (ref 0–0)
PERCENT MAX PREDICTED HR: 71.9 %
PLATELET # BLD AUTO: 90 10*3/MM3 (ref 130–400)
PMV BLD AUTO: 11 FL (ref 6–12)
POTASSIUM BLD-SCNC: 3.6 MMOL/L (ref 3.5–5.3)
PROT SERPL-MCNC: 6.8 G/DL (ref 6.3–8.7)
RBC # BLD AUTO: 3.44 10*6/MM3 (ref 4.2–5.4)
SODIUM BLD-SCNC: 142 MMOL/L (ref 135–145)
STRESS BASELINE BP: NORMAL MMHG
STRESS BASELINE HR: 56 BPM
STRESS PERCENT HR: 85 %
STRESS POST EXERCISE DUR MIN: 14 MIN
STRESS POST EXERCISE DUR SEC: 21 SEC
STRESS POST PEAK BP: NORMAL MMHG
STRESS POST PEAK HR: 110 BPM
STRESS TARGET HR: 130 BPM
TRIGL SERPL-MCNC: 84 MG/DL (ref 0–149)
TROPONIN I SERPL-MCNC: <0.012 NG/ML (ref 0–0.03)
TROPONIN I SERPL-MCNC: <0.012 NG/ML (ref 0–0.03)
TSH SERPL DL<=0.05 MIU/L-ACNC: 0.3 MIU/ML (ref 0.47–4.68)
WBC NRBC COR # BLD: 3.89 10*3/MM3 (ref 4.8–10.8)

## 2018-12-28 PROCEDURE — 93350 STRESS TTE ONLY: CPT | Performed by: INTERNAL MEDICINE

## 2018-12-28 PROCEDURE — 80061 LIPID PANEL: CPT | Performed by: FAMILY MEDICINE

## 2018-12-28 PROCEDURE — G0378 HOSPITAL OBSERVATION PER HR: HCPCS

## 2018-12-28 PROCEDURE — 83036 HEMOGLOBIN GLYCOSYLATED A1C: CPT | Performed by: FAMILY MEDICINE

## 2018-12-28 PROCEDURE — 96376 TX/PRO/DX INJ SAME DRUG ADON: CPT

## 2018-12-28 PROCEDURE — 25010000003 DOBUTAMINE PER 250 MG: Performed by: INTERNAL MEDICINE

## 2018-12-28 PROCEDURE — 93350 STRESS TTE ONLY: CPT

## 2018-12-28 PROCEDURE — 94799 UNLISTED PULMONARY SVC/PX: CPT

## 2018-12-28 PROCEDURE — 25010000002 PERFLUTREN 6.52 MG/ML SUSPENSION: Performed by: INTERNAL MEDICINE

## 2018-12-28 PROCEDURE — 99214 OFFICE O/P EST MOD 30 MIN: CPT | Performed by: INTERNAL MEDICINE

## 2018-12-28 PROCEDURE — 93018 CV STRESS TEST I&R ONLY: CPT | Performed by: INTERNAL MEDICINE

## 2018-12-28 PROCEDURE — 94760 N-INVAS EAR/PLS OXIMETRY 1: CPT

## 2018-12-28 PROCEDURE — 84484 ASSAY OF TROPONIN QUANT: CPT | Performed by: FAMILY MEDICINE

## 2018-12-28 PROCEDURE — 93017 CV STRESS TEST TRACING ONLY: CPT

## 2018-12-28 PROCEDURE — 80053 COMPREHEN METABOLIC PANEL: CPT | Performed by: FAMILY MEDICINE

## 2018-12-28 PROCEDURE — 85025 COMPLETE CBC W/AUTO DIFF WBC: CPT | Performed by: FAMILY MEDICINE

## 2018-12-28 PROCEDURE — 96361 HYDRATE IV INFUSION ADD-ON: CPT

## 2018-12-28 PROCEDURE — 96375 TX/PRO/DX INJ NEW DRUG ADDON: CPT

## 2018-12-28 PROCEDURE — 25010000002 KETOROLAC TROMETHAMINE PER 15 MG: Performed by: FAMILY MEDICINE

## 2018-12-28 PROCEDURE — 83735 ASSAY OF MAGNESIUM: CPT | Performed by: FAMILY MEDICINE

## 2018-12-28 PROCEDURE — 93352 ADMIN ECG CONTRAST AGENT: CPT | Performed by: INTERNAL MEDICINE

## 2018-12-28 PROCEDURE — 84443 ASSAY THYROID STIM HORMONE: CPT | Performed by: FAMILY MEDICINE

## 2018-12-28 RX ORDER — AMLODIPINE BESYLATE 5 MG/1
5 TABLET ORAL DAILY
COMMUNITY
End: 2021-01-09

## 2018-12-28 RX ORDER — METHYLPREDNISOLONE 4 MG/1
TABLET ORAL
Qty: 21 TABLET | Refills: 0 | Status: SHIPPED | OUTPATIENT
Start: 2018-12-28 | End: 2021-01-09

## 2018-12-28 RX ORDER — DOBUTAMINE HYDROCHLORIDE 100 MG/100ML
10-50 INJECTION INTRAVENOUS CONTINUOUS
Status: DISCONTINUED | OUTPATIENT
Start: 2018-12-28 | End: 2018-12-28 | Stop reason: HOSPADM

## 2018-12-28 RX ORDER — ASPIRIN 81 MG/1
81 TABLET ORAL DAILY
Qty: 30 TABLET | Refills: 2 | Status: SHIPPED | OUTPATIENT
Start: 2018-12-29 | End: 2021-01-09

## 2018-12-28 RX ORDER — LEVOTHYROXINE SODIUM 88 UG/1
88 TABLET ORAL DAILY
COMMUNITY

## 2018-12-28 RX ADMIN — PANTOPRAZOLE SODIUM 40 MG: 40 TABLET, DELAYED RELEASE ORAL at 08:48

## 2018-12-28 RX ADMIN — LEVOTHYROXINE SODIUM 88 MCG: 88 TABLET ORAL at 05:17

## 2018-12-28 RX ADMIN — PERFLUTREN 8.48 MG: 6.52 INJECTION, SUSPENSION INTRAVENOUS at 10:14

## 2018-12-28 RX ADMIN — ATROPINE SULFATE 2 MG: 0.1 INJECTION, SOLUTION INTRAVENOUS at 10:52

## 2018-12-28 RX ADMIN — METOPROLOL TARTRATE 25 MG: 25 TABLET, FILM COATED ORAL at 08:46

## 2018-12-28 RX ADMIN — ASPIRIN 81 MG: 81 TABLET, DELAYED RELEASE ORAL at 08:46

## 2018-12-28 RX ADMIN — Medication 10 MCG/KG/MIN: at 10:14

## 2018-12-28 RX ADMIN — KETOROLAC TROMETHAMINE 30 MG: 30 INJECTION, SOLUTION INTRAMUSCULAR at 05:23

## 2018-12-28 RX ADMIN — FAMOTIDINE 20 MG: 10 INJECTION, SOLUTION INTRAVENOUS at 08:48

## 2018-12-28 RX ADMIN — IPRATROPIUM BROMIDE AND ALBUTEROL SULFATE 3 ML: 2.5; .5 SOLUTION RESPIRATORY (INHALATION) at 14:23

## 2018-12-28 RX ADMIN — IPRATROPIUM BROMIDE AND ALBUTEROL SULFATE 3 ML: 2.5; .5 SOLUTION RESPIRATORY (INHALATION) at 06:37

## 2018-12-28 RX ADMIN — AMLODIPINE BESYLATE 5 MG: 5 TABLET ORAL at 08:46

## 2018-12-28 NOTE — PLAN OF CARE
Problem: Patient Care Overview  Goal: Plan of Care Review  Outcome: Ongoing (interventions implemented as appropriate)   12/28/18 0211   Coping/Psychosocial   Plan of Care Reviewed With patient   Plan of Care Review   Progress no change   OTHER   Outcome Summary VSS, c/o right under breast/right flank pain. Pain med offered but pt refused. Stress test scheduled for AM. NPO per order. NSR on tele. Cont to monitor     Goal: Individualization and Mutuality  Outcome: Ongoing (interventions implemented as appropriate)    Goal: Discharge Needs Assessment  Outcome: Ongoing (interventions implemented as appropriate)      Problem: Pain, Acute (Adult)  Goal: Identify Related Risk Factors and Signs and Symptoms  Outcome: Ongoing (interventions implemented as appropriate)   12/27/18 1822   Pain, Acute (Adult)   Related Risk Factors (Acute Pain) persistent pain   Signs and Symptoms (Acute Pain) verbalization of pain descriptors     Goal: Acceptable Pain Control/Comfort Level  Outcome: Ongoing (interventions implemented as appropriate)

## 2018-12-28 NOTE — H&P
[facility] Medicine Services  HISTORY AND PHYSICAL    Date of Admission: 12/27/2018  Primary Care Physician: Carrie Darby DO    Subjective     Chief Complaint: Chest pain    History of Present Illness  James is a 68-year-old  female presented ER.  Right-sided chest pain and right upper back pain.  Patient's Pain is sharp and intensity when she breathes.  Patient's for Pergonal for 2 days with a long nonproductive cough.  The cough and costly gag at times.  Patient denies any nausea, vomiting.  History lung cancer which the left lung has been removed in 2008.  CTA is negative for PE per ER physician at [facility]. Reason for transfer because of no cardiology.     Review of Systems   Constitutional: Positive for activity change, appetite change and fatigue. Negative for chills and fever.   HENT: Negative for hearing loss, nosebleeds, tinnitus and trouble swallowing.    Eyes: Negative for visual disturbance.   Respiratory: Positive for cough. Negative for chest tightness, shortness of breath and wheezing.    Cardiovascular: Positive for chest pain. Negative for palpitations and leg swelling.   Gastrointestinal: Positive for abdominal pain (pigastric discomfort.) and nausea. Negative for abdominal distention, blood in stool, constipation, diarrhea and vomiting.   Endocrine: Negative for cold intolerance, heat intolerance, polydipsia, polyphagia and polyuria.   Genitourinary: Negative for decreased urine volume, difficulty urinating, dysuria, flank pain, frequency and hematuria.   Musculoskeletal: Negative for arthralgias, joint swelling and myalgias.   Skin: Negative for rash.   Allergic/Immunologic: Negative for immunocompromised state.   Neurological: Positive for weakness. Negative for dizziness, syncope, light-headedness and headaches.   Hematological: Negative for adenopathy. Does not bruise/bleed easily.   Psychiatric/Behavioral: Negative for  confusion and sleep disturbance. The patient is not nervous/anxious.         Otherwise complete ROS reviewed and negative except as mentioned in the HPI.      Past Medical History:   Past Medical History:   Diagnosis Date   • Cancer (CMS/HCC)     LUNG   • GERD (gastroesophageal reflux disease)    • Hyperthyroidism    • Infectious viral hepatitis     H/O HEPATITIS C   • Swelling of lower extremity        Past Surgical History:  Past Surgical History:   Procedure Laterality Date   • CLAVICLE SURGERY Right    • COLONOSCOPY     • DILATION AND CURETTAGE, DIAGNOSTIC / THERAPEUTIC     • HYSTERECTOMY      TOTAL ABDOMINAL HYSTERECTOMY   • KNEE ARTHROSCOPY Bilateral    • LUNG REMOVAL, TOTAL Left    • MYRINGOTOMY W/ TUBES     • THYROID BIOPSY      BENIGN   • TUBAL ABDOMINAL LIGATION      X2       Family History: family history includes Cancer in her brother and other; Coronary artery disease in her brother and other; Hyperthyroidism in her sister; Hypothyroidism in her sister; Melanoma in her other and sister; Other in her father and mother; Thyroid disease in her other.    Social History:  reports that she quit smoking about 10 years ago. Her smoking use included cigarettes. She smoked 1.50 packs per day. she has never used smokeless tobacco. She reports that she drinks alcohol. She reports that she does not use drugs.    Medications:  Prior to Admission medications    Medication Sig Start Date End Date Taking? Authorizing Provider   levothyroxine (SYNTHROID, LEVOTHROID) 88 MCG tablet TK 1 T PO QAM 7/23/17  Yes ProviderLeticia MD   metoprolol tartrate (LOPRESSOR) 25 MG tablet Take 25 mg by mouth 2 (Two) Times a Day.   Yes ProviderLeticia MD   pantoprazole (PROTONIX) 40 MG EC tablet Take 40 mg by mouth Daily.   Yes ProviderLeticia MD   sertraline (ZOLOFT) 50 MG tablet Take 50 mg by mouth Daily.   Yes ProviderLeticia MD   sucralfate (CARAFATE) 1 g tablet Take 1 g by mouth 3 (Three) Times a Day.   Yes  "Leticia Deshpande MD   tolterodine LA (DETROL LA) 4 MG 24 hr capsule Take 4 mg by mouth Daily.   Yes Leticia Deshpande MD   amLODIPine (NORVASC) 5 MG tablet TK 1 T PO QD 8/15/17   Leticia Deshpande MD   omeprazole (PriLOSEC) 40 MG capsule  9/7/16   Leticia Deshpande MD   PROLENSA 0.07 % solution INSTILL 1 DROP IN THE LEFT EYE QD 7/19/16   Leticia Deshpande MD     Allergies:  Allergies   Allergen Reactions   • Dilaudid [Hydromorphone Hcl]        Objective     Vital Signs: /71 (BP Location: Left arm, Patient Position: Lying)   Pulse 82   Temp 97.6 °F (36.4 °C) (Temporal)   Resp 18   Ht 162.6 cm (64\")   Wt 86.2 kg (190 lb)   SpO2 100%   BMI 32.61 kg/m²   Physical Exam   Constitutional: She is oriented to person, place, and time. She appears well-developed and well-nourished.   HENT:   Head: Normocephalic and atraumatic.   Eyes: Conjunctivae and EOM are normal. Pupils are equal, round, and reactive to light.   Neck: Neck supple. No JVD present. No thyromegaly present.   Cardiovascular: Normal rate, regular rhythm, normal heart sounds and intact distal pulses. Exam reveals no gallop and no friction rub.   No murmur heard.  Right-sided chest pain, reproducible.  Increasing chest with deep breathing.  Very atypical.   Pulmonary/Chest: Effort normal. No respiratory distress. She has wheezes. She has no rales. She exhibits no tenderness.   Light and wheezing, right lung.  Good air movement.   Abdominal: Soft. Bowel sounds are normal. She exhibits no distension. There is no tenderness. There is no rebound and no guarding.   Epigastric discomfort.  No guarding, no rebound.   Musculoskeletal: Normal range of motion. She exhibits no edema, tenderness or deformity.   Lymphadenopathy:     She has no cervical adenopathy.   Neurological: She is alert and oriented to person, place, and time. She displays normal reflexes. No cranial nerve deficit. She exhibits normal muscle tone.   Skin: Skin is warm " and dry. Capillary refill takes 2 to 3 seconds. No rash noted.   Psychiatric: She has a normal mood and affect. Her behavior is normal. Judgment and thought content normal.   Nursing note and vitals reviewed.    Results Reviewed:  Laboratory-white blood cells 4.3, hemoglobin 12, hematocrit 37, platelets 110  ABG-pH 7.4, CO2 38, O2 50.  D-dimer 0.93.  CMP-glucose 121, BU 19, creatinine 0.8, sodium 140, potassium 3.7, anion gap is 9, AST is 31, albumin 4, total protein 8.2, alkaline phosphatase 86, total bilirubin 0.5, calcium is 10, ALTs 41, GFR 72.  , CK-MB 1, CK 45, , troponin less than 0.02, magnesium 1.6      Radiology Data:    EKG shows ST depression anteriorly lead.    I have personally reviewed and interpreted the radiology studies and ECG obtained at time of admission.     Assessment / Plan      Assessment & Plan  Active Hospital Problems    Diagnosis   • Chest pain, atypical   • History of lung cancer   • S/P lobectomy of lung     Plans.  Chest pain/atypical. Reason for transfer no cardiology.  Trend troponin.  Echocardiogram 7/16/18 , ejection fraction 67%.  Stress test in a.m. Cardiology consult tomorrow.  Toradol when necessary pain.     History of lung cancer/lobectomy removal of the left lungs/sob. Duo neb.  Chest x-ray.    Hypertension.  Continue Norvasc, continue Lopressor.    Hypothyroidism.  Continue Synthroid.    Anxiety.  Continue with Zoloft.    History of variceal bleed.  No acute bleed at this time.    Reflux.  Pepcid IV and Zofran.    Code Status: full code     I discussed the patient's findings and my recommendations with: patient    Estimated length of stay: 1-2 days.     David Graham MD   12/27/18   6:13 PM

## 2018-12-28 NOTE — PLAN OF CARE
Problem: Patient Care Overview  Goal: Plan of Care Review  Outcome: Ongoing (interventions implemented as appropriate)   12/27/18 5577   Coping/Psychosocial   Plan of Care Reviewed With patient   Plan of Care Review   Progress no change   OTHER   Outcome Summary Patient admitted from [facility] with c/o right sided CP/flank pain. States it hurts worse with deep breaths. VSS. NS on tele. Awaiting MD orders. Continue to monitor.        Problem: Pain, Acute (Adult)  Goal: Identify Related Risk Factors and Signs and Symptoms  Outcome: Ongoing (interventions implemented as appropriate)    Goal: Acceptable Pain Control/Comfort Level  Outcome: Ongoing (interventions implemented as appropriate)

## 2018-12-28 NOTE — DISCHARGE SUMMARY
[facility] Medicine Services  DISCHARGE SUMMARY       Date of Admission: 12/27/2018  Date of Discharge:  12/28/2018  Primary Care Physician: Carrie Darby DO    Presenting Problem/History of Present Illness:  Chest pain [R07.9]     Final Discharge Diagnoses:  Active Hospital Problems    Diagnosis   • Chest pain, atypical   • History of lung cancer   • S/P lobectomy of lung   • Chest pain       Consults: Consult cardiology.    Pertinent Test Results:   IMPRESSION: Chest x-ray  1. No acute cardiopulmonary process.  2. Evidence of previous LEFT pneumonectomy.    Stress Echo Findings stress echo    Ventricle Size / Description Segment augmentation had a normal response to stress. Cavity size behaved normally in response to stress.   Stress Echo - Peak Stress Findings Post stress images with adequate visualization of myocardium to assess for ischemia. Normal stress echo with no significant echocardiographic evidence for myocardial ischemia.         Chief Complaint on Day of Discharge: none    History of Present Illness on Day of Discharge:   Pat.  is a 68-year-old  female presented ER.  Right-sided chest pain and right upper back pain.  Patient's Pain is sharp and intensity when she breathes.  Patient's for Pergonal for 2 days with a long nonproductive cough.  The cough and costly gag at times.  Patient denies any nausea, vomiting.  History lung cancer which the left lung has been removed in 2008.  CTA is negative for PE per ER physician at [facility]. Reason for transfer because of no cardiology.       Hospital Course:  The patient is a 68 y.o. female who presented to [facility] with chest pain/atypical.      Chest pain/atypical. Reason for transfer because of no cardiology.   troponin was negative ×3.   Echocardiogram 7/16/18 , ejection fraction 67%.  Stress test- normal, no significant evidence of myocardial ischemia.  Discussed the patient is  "more pleuritic/muscle skeletal chest pain.  Patient be discharged much a Dosepak.  Follow with PCP 1 week.    History of lung cancer/lobectomy removal of the left lungs/sob.  Chest ray shows no acute changes.     Hypertension.   patient continue Norvasc and Lopressor.     Hypothyroidism. Pt continues Synthroid.     Anxiety.  Pt continues with Zoloft.    Vital signs stable, afebrile.  Plan to discharge patient home today.  Patient follow up PCP 1 week.     Condition on Discharge:  stable    Physical Exam on Discharge:  /61 (BP Location: Right arm, Patient Position: Lying)   Pulse 61   Temp 97.5 °F (36.4 °C) (Oral)   Resp 18   Ht 162.6 cm (64.02\")   Wt 86.2 kg (190 lb 0.6 oz)   SpO2 98%   BMI 32.60 kg/m²   Physical Exam   Constitutional: She is oriented to person, place, and time. She appears well-developed and well-nourished.   HENT:   Head: Normocephalic and atraumatic.   Eyes: Conjunctivae and EOM are normal. Pupils are equal, round, and reactive to light.   Neck: Neck supple. No JVD present. No thyromegaly present.   Cardiovascular: Normal rate, regular rhythm, normal heart sounds and intact distal pulses. Exam reveals no gallop and no friction rub.   No murmur heard.  Pulmonary/Chest: Effort normal and breath sounds normal. No respiratory distress. She has no wheezes. She has no rales. She exhibits no tenderness.   Left lung.  Right lungs clear.  Good air movement.  Right lower chest wall pains reproducible to palpation.   Abdominal: Soft. Bowel sounds are normal. She exhibits no distension. There is no tenderness. There is no rebound and no guarding.   Musculoskeletal: Normal range of motion. She exhibits no edema, tenderness or deformity.   Lymphadenopathy:     She has no cervical adenopathy.   Neurological: She is alert and oriented to person, place, and time. She displays normal reflexes. No cranial nerve deficit. She exhibits normal muscle tone.   Skin: Skin is warm and dry. No rash noted. "   Psychiatric: She has a normal mood and affect. Her behavior is normal. Judgment and thought content normal.   Nursing note and vitals reviewed.        Discharge Disposition:  Home or Self Care    Discharge Medications:     Discharge Medications      New Medications      Instructions Start Date   aspirin 81 MG EC tablet   81 mg, Oral, Daily      MethylPREDNISolone 4 MG tablet  Commonly known as:  MEDROL (CARLITO)   Take as directed on package instructions.         Continue These Medications      Instructions Start Date   amLODIPine 5 MG tablet  Commonly known as:  NORVASC   5 mg, Oral, Daily      levothyroxine 88 MCG tablet  Commonly known as:  SYNTHROID, LEVOTHROID   88 mcg, Oral, Daily      metoprolol tartrate 25 MG tablet  Commonly known as:  LOPRESSOR   25 mg, Oral, 2 Times Daily      pantoprazole 40 MG EC tablet  Commonly known as:  PROTONIX   40 mg, Oral, Daily      PROLENSA 0.07 % solution  Generic drug:  Bromfenac Sodium   1 drop, Ophthalmic, Daily PRN      sertraline 50 MG tablet  Commonly known as:  ZOLOFT   50 mg, Oral, Daily      sucralfate 1 g tablet  Commonly known as:  CARAFATE   1 g, Oral, 3 Times Daily         Stop These Medications    tolterodine LA 4 MG 24 hr capsule  Commonly known as:  DETROL LA            Discharge Diet:   Diet Instructions     Advance Diet As Tolerated            Activity at Discharge:   Activity Instructions     Activity as Tolerated            Discharge Care Plan/Instructions:     Follow-up Appointments:   Follow up PCP 1 week.    David Graham MD  12/28/18  2:36 PM    Time: Greater than 30 minutes.

## 2018-12-28 NOTE — CONSULTS
[facility], [facility] Consult Note    Referring Provider: Santos  Reason for Consultation: CP    Patient Care Team:  Carrie Darby DO as PCP - General (Family Medicine)  Carrei Darby DO as PCP - Claims Attributed    Chief complaint CP    Subjective .     History of present illness:  This patient is a 67 y/o wf without previous heart history.  She presents to [facility] initially and was transferred here for further evaluation and treatment.  On interview, she states that she developed a tightness in her chest on 12/23 while eating [employer] dinner.  She voices right-sided CP.  It is worse with deep inspirations.  She denies exertional CP.  She notes SOB and 2 pillow orthopnea but no edema, weight gain or PND.  Of note, she has a h/o lung CA, s/p lobectomy.  Cardiac enzymes are negative, and stress test has been completed.    Review of Systems  A 10-point review of systems is obtained and negative except for otherwise mentioned above.    History  Past Medical History:   Diagnosis Date   • Cancer (CMS/HCC)     LUNG   • GERD (gastroesophageal reflux disease)    • Hyperthyroidism    • Infectious viral hepatitis     H/O HEPATITIS C   • Swelling of lower extremity    ,   Past Surgical History:   Procedure Laterality Date   • CARDIAC CATHETERIZATION     • CLAVICLE SURGERY Right    • COLONOSCOPY     • DILATION AND CURETTAGE, DIAGNOSTIC / THERAPEUTIC     • HYSTERECTOMY      TOTAL ABDOMINAL HYSTERECTOMY   • KNEE ARTHROSCOPY Bilateral    • LUNG REMOVAL, TOTAL Left    • MYRINGOTOMY W/ TUBES     • THYROID BIOPSY      BENIGN   • TUBAL ABDOMINAL LIGATION      X2   ,   Family History   Problem Relation Age of Onset   • Cancer Other    • Coronary artery disease Other    • Thyroid disease Other    • Melanoma Other    • Melanoma Sister         LEFT ARM   • Hypothyroidism Sister    • Cancer Brother    • Coronary artery disease Brother    • Hyperthyroidism Sister   "  • Other Mother    • Other Father    • Breast cancer Neg Hx    ,   Social History     Tobacco Use   • Smoking status: Former Smoker     Packs/day: 1.50     Types: Cigarettes     Last attempt to quit: 2008     Years since quitting: 10.9   • Smokeless tobacco: Never Used   • Tobacco comment: LESS THAN 1 PPD FOR 12 YEARS   Substance Use Topics   • Alcohol use: Yes     Comment: OCCASIONAL WINE   • Drug use: No   ,   Medications Prior to Admission   Medication Sig Dispense Refill Last Dose   • levothyroxine (SYNTHROID, LEVOTHROID) 88 MCG tablet TK 1 T PO QAM  2 Taking   • metoprolol tartrate (LOPRESSOR) 25 MG tablet Take 25 mg by mouth 2 (Two) Times a Day.      • pantoprazole (PROTONIX) 40 MG EC tablet Take 40 mg by mouth Daily.      • sertraline (ZOLOFT) 50 MG tablet Take 50 mg by mouth Daily.   Taking   • sucralfate (CARAFATE) 1 g tablet Take 1 g by mouth 3 (Three) Times a Day.      • tolterodine LA (DETROL LA) 4 MG 24 hr capsule Take 4 mg by mouth Daily.      • amLODIPine (NORVASC) 5 MG tablet TK 1 T PO QD  5 Taking   • omeprazole (PriLOSEC) 40 MG capsule    Taking   • PROLENSA 0.07 % solution INSTILL 1 DROP IN THE LEFT EYE QD  5 Taking    and Allergies:  Dilaudid [hydromorphone hcl]    Objective     Vital Sign Min/Max for last 24 hours  Temp  Min: 97.2 °F (36.2 °C)  Max: 97.6 °F (36.4 °C)   BP  Min: 100/60  Max: 132/71   Pulse  Min: 61  Max: 82   Resp  Min: 16  Max: 20   SpO2  Min: 96 %  Max: 100 %   No Data Recorded   Weight  Min: 86.2 kg (190 lb)  Max: 86.2 kg (190 lb 0.6 oz)     Flowsheet Rows      First Filed Value   Admission Height  162.6 cm (64\") Documented at 12/27/2018 1704   Admission Weight  86.2 kg (190 lb) Documented at 12/27/2018 1704             Ejection Fraction  No results found for: EF    Echo EF Estimated  No results found for: ECHOEFEST    Nuclear Stress Ejection Fraction  No components found for: NUCEF    Cath Ejection Fraction Quantitative  No results found for: CATHEF    Physical " Exam:     General Appearance:    Alert, cooperative, in no acute distress   Head:    Normocephalic, without obvious abnormality, atraumatic   Eyes:            Lids and lashes normal, conjunctivae and sclerae normal, no   icterus, PERRLA, EOMI   Throat:   Oral mucosa pink and moist   Neck:   No adenopathy, supple, trachea midline, no thyromegaly, no   carotid bruit, no JVD   Lungs:     Clear to auscultation bilaterally,respirations regular, even     and unlabored    Heart:    Regular rhythm and normal rate, normal S1 and S2, no            murmur, no gallop, no rub, no click   Chest Wall:    No abnormalities observed   Abdomen:     Normal bowel sounds present in all four quadrants, no       masses, no organomegaly, soft non-tender, non-distended    Extremities:   No edema, no cyanosis, no clubbing   Pulses:   Pulses palpable and equal bilaterally   Skin:   No bleeding, bruising or rash   Psychiatric:   Displays appropriate mood and affect           Results Review:    I reviewed the patient's new clinical results.    Results from last 7 days   Lab Units  12/28/18   0505   WBC 10*3/mm3  3.89*   HEMOGLOBIN g/dL  10.7*   HEMATOCRIT %  31.9*   PLATELETS 10*3/mm3  90*     Results from last 7 days   Lab Units  12/28/18   0505   SODIUM mmol/L  142   POTASSIUM mmol/L  3.6   CHLORIDE mmol/L  102   CO2 mmol/L  27.0   BUN mg/dL  15   CREATININE mg/dL  0.66   GLUCOSE mg/dL  119*   CALCIUM mg/dL  8.9     Results from last 7 days   Lab Units  12/28/18   0505   SODIUM mmol/L  142   POTASSIUM mmol/L  3.6   CHLORIDE mmol/L  102   CO2 mmol/L  27.0   BUN mg/dL  15   CREATININE mg/dL  0.66   CALCIUM mg/dL  8.9   BILIRUBIN mg/dL  0.4   ALK PHOS U/L  64   ALT (SGPT) U/L  32   AST (SGOT) U/L  27   GLUCOSE mg/dL  119*     Results from last 7 days   Lab Units  12/28/18   0505  12/27/18   2355  12/27/18   1906   TROPONIN I ng/mL  <0.012  <0.012  <0.012     Results from last 7 days   Lab Units  12/28/18   0505   TSH mIU/mL  0.304*     Results  from last 7 days   Lab Units  12/28/18   0505   CHOLESTEROL mg/dL  123*   TRIGLYCERIDES mg/dL  84   HDL CHOL mg/dL  44*   LDL CHOL mg/dL  82       Assessment/Plan       Chest pain, atypical    History of lung cancer    S/P lobectomy of lung  HTN  Former tobaccoism  Anxiety  GERD    Await stress test results.  Further recommendations to follow after that and after Dr. Alvarez evaluates this patient.  Thank you for the consult.  Cardiology will gladly follow with you.      I discussed the patient's findings and my recommendations with patient    Radha Ortega PA-C  12/28/18  11:46 AM

## 2019-10-03 ENCOUNTER — TELEPHONE (OUTPATIENT)
Dept: OTOLARYNGOLOGY | Age: 69
End: 2019-10-03

## 2019-10-08 ENCOUNTER — PROCEDURE VISIT (OUTPATIENT)
Dept: OTOLARYNGOLOGY | Age: 69
End: 2019-10-08
Payer: MEDICARE

## 2019-10-08 DIAGNOSIS — H90.72 MIXED CONDUCTIVE AND SENSORINEURAL HEARING LOSS OF LEFT EAR WITH UNRESTRICTED HEARING OF RIGHT EAR: ICD-10-CM

## 2019-10-08 DIAGNOSIS — H61.892 SWELLING OF EXTERNAL EAR, LEFT: Primary | ICD-10-CM

## 2019-10-08 PROCEDURE — 92553 AUDIOMETRY AIR & BONE: CPT | Performed by: AUDIOLOGIST

## 2021-01-09 ENCOUNTER — HOSPITAL ENCOUNTER (EMERGENCY)
Facility: HOSPITAL | Age: 71
Discharge: HOME | End: 2021-01-09
Attending: FAMILY MEDICINE | Admitting: FAMILY MEDICINE

## 2021-01-09 ENCOUNTER — APPOINTMENT (OUTPATIENT)
Dept: CT IMAGING | Facility: HOSPITAL | Age: 71
End: 2021-01-09

## 2021-01-09 ENCOUNTER — APPOINTMENT (OUTPATIENT)
Dept: GENERAL RADIOLOGY | Facility: HOSPITAL | Age: 71
End: 2021-01-09

## 2021-01-09 VITALS
DIASTOLIC BLOOD PRESSURE: 64 MMHG | TEMPERATURE: 97.8 F | RESPIRATION RATE: 18 BRPM | SYSTOLIC BLOOD PRESSURE: 126 MMHG | HEIGHT: 64 IN | OXYGEN SATURATION: 100 % | WEIGHT: 168 LBS | HEART RATE: 62 BPM | BODY MASS INDEX: 28.68 KG/M2

## 2021-01-09 DIAGNOSIS — R07.9 CHEST PAIN, UNSPECIFIED TYPE: Primary | ICD-10-CM

## 2021-01-09 LAB
ALBUMIN SERPL-MCNC: 4.6 G/DL (ref 3.5–5.2)
ALBUMIN/GLOB SERPL: 1.2 G/DL
ALP SERPL-CCNC: 83 U/L (ref 39–117)
ALT SERPL W P-5'-P-CCNC: 38 U/L (ref 1–33)
ANION GAP SERPL CALCULATED.3IONS-SCNC: 8 MMOL/L (ref 5–15)
AST SERPL-CCNC: 37 U/L (ref 1–32)
BASOPHILS # BLD AUTO: 0.04 10*3/MM3 (ref 0–0.2)
BASOPHILS NFR BLD AUTO: 0.8 % (ref 0–1.5)
BILIRUB SERPL-MCNC: 0.8 MG/DL (ref 0–1.2)
BILIRUB UR QL STRIP: NEGATIVE
BUN SERPL-MCNC: 11 MG/DL (ref 8–23)
BUN/CREAT SERPL: 18 (ref 7–25)
CALCIUM SPEC-SCNC: 9.4 MG/DL (ref 8.6–10.5)
CHLORIDE SERPL-SCNC: 104 MMOL/L (ref 98–107)
CLARITY UR: CLEAR
CO2 SERPL-SCNC: 28 MMOL/L (ref 22–29)
COLOR UR: YELLOW
CREAT SERPL-MCNC: 0.61 MG/DL (ref 0.57–1)
DEPRECATED RDW RBC AUTO: 44.8 FL (ref 37–54)
EOSINOPHIL # BLD AUTO: 0.07 10*3/MM3 (ref 0–0.4)
EOSINOPHIL NFR BLD AUTO: 1.4 % (ref 0.3–6.2)
ERYTHROCYTE [DISTWIDTH] IN BLOOD BY AUTOMATED COUNT: 13.2 % (ref 12.3–15.4)
GFR SERPL CREATININE-BSD FRML MDRD: 118 ML/MIN/1.73
GFR SERPL CREATININE-BSD FRML MDRD: 97 ML/MIN/1.73
GLOBULIN UR ELPH-MCNC: 4 GM/DL
GLUCOSE SERPL-MCNC: 105 MG/DL (ref 65–99)
GLUCOSE UR STRIP-MCNC: NEGATIVE MG/DL
HCT VFR BLD AUTO: 41.2 % (ref 34–46.6)
HGB BLD-MCNC: 14.2 G/DL (ref 12–15.9)
HGB UR QL STRIP.AUTO: NEGATIVE
HOLD SPECIMEN: NORMAL
IMM GRANULOCYTES # BLD AUTO: 0.01 10*3/MM3 (ref 0–0.05)
IMM GRANULOCYTES NFR BLD AUTO: 0.2 % (ref 0–0.5)
KETONES UR QL STRIP: NEGATIVE
LEUKOCYTE ESTERASE UR QL STRIP.AUTO: NEGATIVE
LYMPHOCYTES # BLD AUTO: 1.1 10*3/MM3 (ref 0.7–3.1)
LYMPHOCYTES NFR BLD AUTO: 21.9 % (ref 19.6–45.3)
MCH RBC QN AUTO: 32.2 PG (ref 26.6–33)
MCHC RBC AUTO-ENTMCNC: 34.5 G/DL (ref 31.5–35.7)
MCV RBC AUTO: 93.4 FL (ref 79–97)
MONOCYTES # BLD AUTO: 0.46 10*3/MM3 (ref 0.1–0.9)
MONOCYTES NFR BLD AUTO: 9.2 % (ref 5–12)
NEUTROPHILS NFR BLD AUTO: 3.34 10*3/MM3 (ref 1.7–7)
NEUTROPHILS NFR BLD AUTO: 66.5 % (ref 42.7–76)
NITRITE UR QL STRIP: NEGATIVE
NRBC BLD AUTO-RTO: 0 /100 WBC (ref 0–0.2)
PH UR STRIP.AUTO: 6.5 [PH] (ref 5–8)
PLATELET # BLD AUTO: 99 10*3/MM3 (ref 140–450)
PMV BLD AUTO: 10.6 FL (ref 6–12)
POTASSIUM SERPL-SCNC: 3.8 MMOL/L (ref 3.5–5.2)
PROT SERPL-MCNC: 8.6 G/DL (ref 6–8.5)
PROT UR QL STRIP: NEGATIVE
RBC # BLD AUTO: 4.41 10*6/MM3 (ref 3.77–5.28)
SARS-COV-2 RNA PNL SPEC NAA+PROBE: NOT DETECTED
SODIUM SERPL-SCNC: 140 MMOL/L (ref 136–145)
SP GR UR STRIP: 1.02 (ref 1–1.03)
TROPONIN T SERPL-MCNC: <0.01 NG/ML (ref 0–0.03)
TROPONIN T SERPL-MCNC: <0.01 NG/ML (ref 0–0.03)
UROBILINOGEN UR QL STRIP: NORMAL
WBC # BLD AUTO: 5.02 10*3/MM3 (ref 3.4–10.8)
WHOLE BLOOD HOLD SPECIMEN: NORMAL
WHOLE BLOOD HOLD SPECIMEN: NORMAL

## 2021-01-09 PROCEDURE — 71275 CT ANGIOGRAPHY CHEST: CPT

## 2021-01-09 PROCEDURE — 71045 X-RAY EXAM CHEST 1 VIEW: CPT

## 2021-01-09 PROCEDURE — 81003 URINALYSIS AUTO W/O SCOPE: CPT | Performed by: FAMILY MEDICINE

## 2021-01-09 PROCEDURE — 87635 SARS-COV-2 COVID-19 AMP PRB: CPT | Performed by: FAMILY MEDICINE

## 2021-01-09 PROCEDURE — 93010 ELECTROCARDIOGRAM REPORT: CPT | Performed by: INTERNAL MEDICINE

## 2021-01-09 PROCEDURE — 99284 EMERGENCY DEPT VISIT MOD MDM: CPT

## 2021-01-09 PROCEDURE — 85025 COMPLETE CBC W/AUTO DIFF WBC: CPT | Performed by: FAMILY MEDICINE

## 2021-01-09 PROCEDURE — 0 IOPAMIDOL PER 1 ML: Performed by: FAMILY MEDICINE

## 2021-01-09 PROCEDURE — 84484 ASSAY OF TROPONIN QUANT: CPT | Performed by: FAMILY MEDICINE

## 2021-01-09 PROCEDURE — 93005 ELECTROCARDIOGRAM TRACING: CPT | Performed by: FAMILY MEDICINE

## 2021-01-09 PROCEDURE — 36415 COLL VENOUS BLD VENIPUNCTURE: CPT

## 2021-01-09 PROCEDURE — 80053 COMPREHEN METABOLIC PANEL: CPT | Performed by: FAMILY MEDICINE

## 2021-01-09 RX ORDER — TIZANIDINE 4 MG/1
4 TABLET ORAL NIGHTLY PRN
COMMUNITY

## 2021-01-09 RX ORDER — SODIUM CHLORIDE 0.9 % (FLUSH) 0.9 %
10 SYRINGE (ML) INJECTION AS NEEDED
Status: DISCONTINUED | OUTPATIENT
Start: 2021-01-09 | End: 2021-01-09 | Stop reason: HOSPADM

## 2021-01-09 RX ADMIN — IOPAMIDOL 100 ML: 755 INJECTION, SOLUTION INTRAVENOUS at 15:49

## 2021-01-09 NOTE — ED PROVIDER NOTES
Subjective   Ms. Palomares is a 70-year-old female with a history significant for lung cancer and a total left pneumonectomy.  Of note, she did have a negative stress test 2 years ago.  She presents today because of chest pain that started in the early hours of this morning.  She states she woke up to go to the bathroom around 2:30 AM and noted a right-sided anterior pricking chest pain.  The patient does not have associated shortness of breath and she denies fever or other systemic symptoms.          Review of Systems   Cardiovascular: Positive for chest pain.   All other systems reviewed and are negative.      Past Medical History:   Diagnosis Date   • Cancer (CMS/HCC)     LUNG   • GERD (gastroesophageal reflux disease)    • Hyperthyroidism    • Infectious viral hepatitis     H/O HEPATITIS C   • Swelling of lower extremity        Allergies   Allergen Reactions   • Dilaudid [Hydromorphone Hcl] Hallucinations       Past Surgical History:   Procedure Laterality Date   • CARDIAC CATHETERIZATION     • CLAVICLE SURGERY Right    • COLONOSCOPY     • DILATION AND CURETTAGE, DIAGNOSTIC / THERAPEUTIC     • HYSTERECTOMY      TOTAL ABDOMINAL HYSTERECTOMY   • KNEE ARTHROSCOPY Bilateral    • LUNG REMOVAL, TOTAL Left    • MYRINGOTOMY W/ TUBES     • THYROID BIOPSY      BENIGN   • TUBAL ABDOMINAL LIGATION      X2       Family History   Problem Relation Age of Onset   • Cancer Other    • Coronary artery disease Other    • Thyroid disease Other    • Melanoma Other    • Melanoma Sister         LEFT ARM   • Hypothyroidism Sister    • Cancer Brother    • Coronary artery disease Brother    • Hyperthyroidism Sister    • Other Mother    • Other Father    • Breast cancer Neg Hx        Social History     Socioeconomic History   • Marital status:      Spouse name: Not on file   • Number of children: Not on file   • Years of education: Not on file   • Highest education level: Not on file   Occupational History   • Occupation: DISABLED    Tobacco Use   • Smoking status: Former Smoker     Packs/day: 1.50     Types: Cigarettes     Quit date:      Years since quittin.0   • Smokeless tobacco: Never Used   • Tobacco comment: LESS THAN 1 PPD FOR 12 YEARS   Substance and Sexual Activity   • Alcohol use: Yes     Comment: OCCASIONAL WINE   • Drug use: No   • Sexual activity: Defer     Partners: Male     Birth control/protection: None           Objective   Physical Exam  Vitals signs and nursing note reviewed.   Constitutional:       Appearance: She is well-developed.   HENT:      Head: Normocephalic and atraumatic.      Right Ear: External ear normal.      Left Ear: External ear normal.      Nose: Nose normal.      Mouth/Throat:      Mouth: Mucous membranes are moist.      Pharynx: Oropharynx is clear.   Eyes:      Conjunctiva/sclera: Conjunctivae normal.   Neck:      Musculoskeletal: Normal range of motion and neck supple.   Cardiovascular:      Rate and Rhythm: Normal rate and regular rhythm.      Heart sounds: Normal heart sounds.      Comments: Of note the patient's heart sounds are best heard in the left posterior lung fields  Pulmonary:      Effort: Pulmonary effort is normal.      Breath sounds: Normal breath sounds.   Abdominal:      General: Bowel sounds are normal.      Palpations: Abdomen is soft.   Musculoskeletal: Normal range of motion.   Skin:     General: Skin is warm and dry.      Capillary Refill: Capillary refill takes less than 2 seconds.   Neurological:      Mental Status: She is alert and oriented to person, place, and time.   Psychiatric:         Behavior: Behavior normal.         Thought Content: Thought content normal.         Judgment: Judgment normal.         Procedures           ED Course                                         HEART Score (for prediction of 6-week risk of major adverse cardiac event) reviewed and/or performed as part of the patient evaluation and treatment planning process.  The result associated with  this review/performance is: 2       MDM    Final diagnoses:   Chest pain, unspecified type     The patient CT scan apart from the obvious abnormalities related to her pneumectomy was negative for PE or other obvious pathology.  She had 2 - troponins and I will discharge her home for consideration of a repeat stress test as an outpatient.  She knows to return for any worsening of symptoms.       Dwight Adams MD  01/09/21 [id number]

## 2021-01-10 LAB
QT INTERVAL: 410 MS
QT INTERVAL: 474 MS
QTC INTERVAL: 422 MS
QTC INTERVAL: 469 MS

## 2021-01-12 ENCOUNTER — TRANSCRIBE ORDERS (OUTPATIENT)
Dept: ADMINISTRATIVE | Facility: HOSPITAL | Age: 71
End: 2021-01-12

## 2021-01-12 DIAGNOSIS — R42 DIZZINESS: Primary | ICD-10-CM

## 2021-01-12 DIAGNOSIS — R07.9 CHEST PAIN, UNSPECIFIED TYPE: ICD-10-CM

## 2021-01-13 ENCOUNTER — APPOINTMENT (OUTPATIENT)
Dept: CARDIOLOGY | Facility: HOSPITAL | Age: 71
End: 2021-01-13

## 2021-01-20 ENCOUNTER — HOSPITAL ENCOUNTER (OUTPATIENT)
Dept: CARDIOLOGY | Facility: HOSPITAL | Age: 71
Discharge: HOME | End: 2021-01-20

## 2021-01-20 VITALS — WEIGHT: 168 LBS | HEIGHT: 64 IN | BODY MASS INDEX: 28.68 KG/M2

## 2021-01-20 DIAGNOSIS — R07.9 CHEST PAIN, UNSPECIFIED TYPE: ICD-10-CM

## 2021-01-20 LAB
BH CV STRESS BP STAGE 1: NORMAL
BH CV STRESS COMMENTS STAGE 1: NORMAL
BH CV STRESS DOSE REGADENOSON STAGE 1: 0.4
BH CV STRESS DURATION MIN STAGE 1: 0
BH CV STRESS DURATION SEC STAGE 1: 10
BH CV STRESS HR STAGE 1: 67
BH CV STRESS PROTOCOL 1: NORMAL
BH CV STRESS RECOVERY BP: NORMAL MMHG
BH CV STRESS RECOVERY HR: 67 BPM
BH CV STRESS STAGE 1: 1
MAXIMAL PREDICTED HEART RATE: 151 BPM
PERCENT MAX PREDICTED HR: 44.37 %
STRESS BASELINE BP: NORMAL MMHG
STRESS BASELINE HR: 60 BPM
STRESS PERCENT HR: 52 %
STRESS POST EXERCISE DUR SEC: 10 SEC
STRESS POST PEAK BP: NORMAL MMHG
STRESS POST PEAK HR: 67 BPM
STRESS TARGET HR: 128 BPM

## 2021-01-20 PROCEDURE — 0 TECHNETIUM SESTAMIBI: Performed by: NURSE PRACTITIONER

## 2021-01-20 PROCEDURE — 25010000002 REGADENOSON 0.4 MG/5ML SOLUTION: Performed by: EMERGENCY MEDICINE

## 2021-01-20 PROCEDURE — 78452 HT MUSCLE IMAGE SPECT MULT: CPT

## 2021-01-20 PROCEDURE — 93018 CV STRESS TEST I&R ONLY: CPT | Performed by: EMERGENCY MEDICINE

## 2021-01-20 PROCEDURE — A9500 TC99M SESTAMIBI: HCPCS | Performed by: NURSE PRACTITIONER

## 2021-01-20 PROCEDURE — 78452 HT MUSCLE IMAGE SPECT MULT: CPT | Performed by: EMERGENCY MEDICINE

## 2021-01-20 PROCEDURE — 93016 CV STRESS TEST SUPVJ ONLY: CPT | Performed by: EMERGENCY MEDICINE

## 2021-01-20 PROCEDURE — 93017 CV STRESS TEST TRACING ONLY: CPT

## 2021-01-20 RX ADMIN — TECHNETIUM TC 99M SESTAMIBI 1 DOSE: 1 INJECTION INTRAVENOUS at 10:13

## 2021-01-20 RX ADMIN — REGADENOSON 0.4 MG: 0.08 INJECTION, SOLUTION INTRAVENOUS at 09:12

## 2021-01-20 RX ADMIN — TECHNETIUM TC 99M SESTAMIBI 1 DOSE: 1 INJECTION INTRAVENOUS at 07:44

## 2021-01-25 ENCOUNTER — TELEPHONE (OUTPATIENT)
Dept: HEMATOLOGY | Age: 71
End: 2021-01-25

## 2021-01-25 DIAGNOSIS — D69.6 TEMPORARY LOW PLATELET COUNT (HCC): Primary | ICD-10-CM

## 2021-01-25 NOTE — TELEPHONE ENCOUNTER
CALLED AND SPOKE WITH DR VARGAS'S OFFICE ABOUT REFERRAL INFO. THEY ARE GOING TO RESEND RECORDS TO US.

## 2021-01-26 ENCOUNTER — HOSPITAL ENCOUNTER (OUTPATIENT)
Dept: INFUSION THERAPY | Age: 71
Discharge: HOME | End: 2021-01-26
Payer: MEDICARE

## 2021-01-26 ENCOUNTER — OFFICE VISIT (OUTPATIENT)
Dept: HEMATOLOGY | Age: 71
End: 2021-01-26
Payer: MEDICARE

## 2021-01-26 VITALS
HEART RATE: 75 BPM | TEMPERATURE: 96.9 F | HEIGHT: 64 IN | DIASTOLIC BLOOD PRESSURE: 60 MMHG | OXYGEN SATURATION: 96 % | SYSTOLIC BLOOD PRESSURE: 118 MMHG | BODY MASS INDEX: 31.84 KG/M2 | WEIGHT: 186.5 LBS

## 2021-01-26 DIAGNOSIS — Z85.118 PERSONAL HISTORY OF LUNG CANCER: ICD-10-CM

## 2021-01-26 DIAGNOSIS — D69.6 THROMBOCYTOPENIA (HCC): ICD-10-CM

## 2021-01-26 DIAGNOSIS — R63.5 ABNORMAL WEIGHT GAIN: ICD-10-CM

## 2021-01-26 DIAGNOSIS — K74.60 CIRRHOSIS OF LIVER WITHOUT ASCITES, UNSPECIFIED HEPATIC CIRRHOSIS TYPE (HCC): ICD-10-CM

## 2021-01-26 DIAGNOSIS — D69.6 TEMPORARY LOW PLATELET COUNT (HCC): ICD-10-CM

## 2021-01-26 DIAGNOSIS — D69.6 TEMPORARY LOW PLATELET COUNT (HCC): Primary | ICD-10-CM

## 2021-01-26 DIAGNOSIS — Z86.19 HEPATITIS C VIRUS INFECTION CURED AFTER ANTIVIRAL DRUG THERAPY: Primary | ICD-10-CM

## 2021-01-26 DIAGNOSIS — Z71.89 CARE PLAN DISCUSSED WITH PATIENT: ICD-10-CM

## 2021-01-26 DIAGNOSIS — Z71.89 COORDINATION OF COMPLEX CARE: ICD-10-CM

## 2021-01-26 LAB
BASOPHILS ABSOLUTE: 0.02 K/UL (ref 0.01–0.08)
BASOPHILS RELATIVE PERCENT: 0.4 % (ref 0.1–1.2)
EOSINOPHILS ABSOLUTE: 0.11 K/UL (ref 0.04–0.54)
EOSINOPHILS RELATIVE PERCENT: 2.3 % (ref 0.7–7)
HCT VFR BLD CALC: 40.7 % (ref 34.1–44.9)
HEMOGLOBIN: 13.4 G/DL (ref 11.2–15.7)
LYMPHOCYTES ABSOLUTE: 1.49 K/UL (ref 1.18–3.74)
LYMPHOCYTES RELATIVE PERCENT: 31.2 % (ref 19.3–53.1)
MCH RBC QN AUTO: 33.3 PG (ref 25.6–32.2)
MCHC RBC AUTO-ENTMCNC: 32.9 G/DL (ref 32.3–35.5)
MCV RBC AUTO: 101.2 FL (ref 79.4–94.8)
MONOCYTES ABSOLUTE: 0.62 K/UL (ref 0.24–0.82)
MONOCYTES RELATIVE PERCENT: 13 % (ref 4.7–12.5)
NEUTROPHILS ABSOLUTE: 2.54 K/UL (ref 1.56–6.13)
NEUTROPHILS RELATIVE PERCENT: 53.1 % (ref 34–71.1)
PDW BLD-RTO: 13.1 % (ref 11.7–14.4)
PLATELET # BLD: 84 K/UL (ref 182–369)
PMV BLD AUTO: 11.1 FL (ref 7.4–10.4)
RBC # BLD: 4.02 M/UL (ref 3.93–5.22)
WBC # BLD: 4.78 K/UL (ref 3.98–10.04)

## 2021-01-26 PROCEDURE — G8484 FLU IMMUNIZE NO ADMIN: HCPCS | Performed by: INTERNAL MEDICINE

## 2021-01-26 PROCEDURE — 1090F PRES/ABSN URINE INCON ASSESS: CPT | Performed by: INTERNAL MEDICINE

## 2021-01-26 PROCEDURE — 36415 COLL VENOUS BLD VENIPUNCTURE: CPT

## 2021-01-26 PROCEDURE — 85025 COMPLETE CBC W/AUTO DIFF WBC: CPT

## 2021-01-26 PROCEDURE — 99205 OFFICE O/P NEW HI 60 MIN: CPT | Performed by: INTERNAL MEDICINE

## 2021-01-26 PROCEDURE — 4040F PNEUMOC VAC/ADMIN/RCVD: CPT | Performed by: INTERNAL MEDICINE

## 2021-01-26 PROCEDURE — 99202 OFFICE O/P NEW SF 15 MIN: CPT

## 2021-01-26 PROCEDURE — G8427 DOCREV CUR MEDS BY ELIG CLIN: HCPCS | Performed by: INTERNAL MEDICINE

## 2021-01-26 PROCEDURE — 3017F COLORECTAL CA SCREEN DOC REV: CPT | Performed by: INTERNAL MEDICINE

## 2021-01-26 PROCEDURE — G8417 CALC BMI ABV UP PARAM F/U: HCPCS | Performed by: INTERNAL MEDICINE

## 2021-01-26 PROCEDURE — 1123F ACP DISCUSS/DSCN MKR DOCD: CPT | Performed by: INTERNAL MEDICINE

## 2021-01-26 PROCEDURE — G8400 PT W/DXA NO RESULTS DOC: HCPCS | Performed by: INTERNAL MEDICINE

## 2021-01-26 RX ORDER — LEVOTHYROXINE SODIUM 0.12 MG/1
125 TABLET ORAL DAILY
COMMUNITY

## 2021-01-26 NOTE — PROGRESS NOTES
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 Kristine Morris History  · 05/2008- Total Left Pneumonectomy followed by 1 cycle of adjuvant Cisplatin/Taxotere (discontinued due to toxicity)  · 02/2017- Laser ablation of vagina with Dr. Porfirio Curtis    Hematology History  Alison Duron was first seen by me 1/26/2021. She was referred by her primary care provider for further work-up of thrombocytopenia. The patient denied having any knowledge of prior thrombocytopenia. She has a history of hepatitis C diagnosed many years ago. Apparently, she was initially treated with Ribavarin but had very poor tolerance. Later on she moved to [place] and was treated with Shruthi Lanes. She has been seen by Dr. Noelle Dunne in [facility]. The patient reports that she has esophageal varices. According to the patient she has not been told that she has cirrhosis. She visited emergency recently at [facility] and had a CT of the chest that showed a cirrhotic liver. She denies any significant drinking. Review of prior CBC showed that she had mild in January 2018. · 12/28/2018CBC at [facility] showed WBC 3.89, hemoglobin 10.7, MCV 92, platelet counts 90,167. · 01/09/2021- WBC 5.02, Hgb 14.2, Hct 41.2, Plts 99,000 at [facility]  · 1/26/2021WBC 4.78, hemoglobin 13.4/, platelet counts 75,151  · 1/26/2021I discussed the findings of cirrhosis of the liver with the patient. We will recheck her HCV quantitative today. I will also check her HIV. The most likely explain nature her thrombocytopenia is likely cirrhosis of the liver. She may have splenomegaly as well. Past Medical History:    Past Medical History:   Diagnosis Date    Cancer ([facility] [address]     lung    COPD (chronic obstructive pulmonary disease) (HCC)     GERD (gastroesophageal reflux disease)     History of hepatitis C     Hypertension     Lung cancer ([facility] [address] 05/2008    CHUNG squamous cell     Thyroid disease     Varices, esophageal ([facility] [address]        Past Surgical History:    Past Surgical History:   Procedure Laterality Date    CHOLECYSTECTOMY      CLAVICLE SURGERY Right     HYSTERECTOMY      JOINT REPLACEMENT Left     left knee replacement    KNEE SURGERY Right     LUNG REMOVAL, TOTAL Left     MD REMOVE EYELID LESN (NOT CHALAZION) Bilateral 1/30/2018    blepharoplasty performed by Reny Farley MD at [facility]         Social History:    Marital status, children:  , 3 children  Smoking status: former smoker-quit 2008  ETOH status: occasional  Profession: Retired healthcare worker-certified nurse assistant  Resides: [place], [place]  Recent trips: No  Pets: No    Family History:   Family History   Problem Relation Age of Onset    Cancer Sister         Melanoma left arm    Cancer Brother         rectal cancer    Diabetes Paternal Aunt     Coronary Art Dis Paternal Aunt     Cancer Maternal Grandmother         Retinal cancer with eye removal    Cancer Paternal Grandmother         Cancer around ear    Diabetes Paternal Grandmother        Current Hospital Medications:    Current Outpatient Medications   Medication Sig Dispense Refill  levothyroxine (SYNTHROID) 125 MCG tablet Take 125 mcg by mouth Daily      albuterol sulfate HFA (PROAIR HFA) 108 (90 Base) MCG/ACT inhaler ProAir HFA 90 mcg/actuation aerosol inhaler   Inhale 2 puffs every 4 hours by inhalation route.  fluticasone (FLONASE) 50 MCG/ACT nasal spray 1 spray by Each Nare route 2 times daily      metoprolol tartrate (LOPRESSOR) 25 MG tablet Take 25 mg by mouth 2 times daily      pantoprazole (PROTONIX) 40 MG tablet Take 40 mg by mouth daily      tiZANidine (ZANAFLEX) 4 MG tablet 1/4 tablet with meals 1/2 to whole tablet at bedtime 60 tablet 1     No current facility-administered medications for this visit. Allergies:    Allergies   Allergen Reactions    Dilaudid [Hydromorphone Hcl]      hallucinations      Mirabegron Hives         Subjective   REVIEW OF SYSTEMS:   CONSTITUTIONAL: no fever, no night sweats, fatigue;  HEENT: no blurring of vision, no double vision, no hearing difficulty, no tinnitus, no ulceration, no dysplasia, no epistaxis;  LUNGS: no cough, no hemoptysis, no wheeze,  no shortness of breath;  CARDIOVASCULAR: no palpitation, no chest pain, no shortness of breath;  GI: no abdominal pain, no nausea, no vomiting, no diarrhea, no constipation;  ANDREINA: no dysuria, no hematuria, no frequency or urgency, no nephrolithiasis;  MUSCULOSKELETAL: no joint pain, no swelling, no stiffness;  ENDOCRINE: no polyuria, no polydipsia, no cold or heat intolerance;  HEMATOLOGY: no easy bruising or bleeding, no history of clotting disorder;  DERMATOLOGY: no skin rash, no eczema, no pruritus;  PSYCHIATRY: no depression, no anxiety, no panic attacks, no suicidal ideation, no homicidal ideation;  NEUROLOGY: no syncope, no seizures, no numbness or tingling of hands, no numbness or tingling of feet, no paresis;    Objective   /60   Pulse 75   Temp 96.9 °F (36.1 °C)   Ht 5' 4\" (1.626 m)   Wt 186 lb 8 oz (84.6 kg)   SpO2 96%   BMI 32.01 kg/m²     PHYSICAL EXAM: CONSTITUTIONAL: Alert, appropriate, no acute distress  EYES: Non icteric, EOM intact, pupils equal round   ENT: Mucus membranes moist, no oral pharyngeal lesions, external inspection of ears and nose are normal  NECK: Supple, no masses. No palpable thyroid mass  CHEST/LUNGS: CTA bilaterally, normal respiratory effort   CARDIOVASCULAR: RRR, no murmurs. No lower extremity edema  ABDOMEN: soft non-tender, active bowel sounds, no HSM. No palpable masses  Chaperoned abdominal exam with Dorys Gomez RN  EXTREMITIES: warm, full ROM in all 4 extremities, no focal weakness. SKIN: warm, dry with no rashes or lesions  LYMPH: No cervical, clavicular, axillary, or inguinal lymphadenopathy  NEUROLOGIC: follows commands, non focal   PSYCH: mood and affect appropriate. Alert and oriented to time, place, person    LABORATORY RESULTS REVIEWED/ANALYZED BY ME:  Lab Results   Component Value Date    WBC 4.78 01/26/2021    HGB 13.4 01/26/2021    HCT 40.7 01/26/2021    .2 (H) 01/26/2021    PLT 84 (L) 01/26/2021     Lab Results   Component Value Date    NEUTROABS 2.54 01/26/2021     My interpretation: Hemoglobin is 13.4. MCV is elevated 101 and platelet counts low at 84,000. RADIOLOGY STUDIES REVIEWED BY ME:  1/9/21 CT angiogram chest: No evidence of PE or acute aortic pathology. Status post left pneumonectomy. Coronary artery disease. Emphysema. Cirrhotic liver morphology. 1/18/21 Bilateral screening mammogram: normal      ASSESSMENT:    No orders of the defined types were placed in this encounter. Wes Flores was seen today for new patient.     Diagnoses and all orders for this visit:    Hepatitis C virus infection cured after antiviral drug therapy    Thrombocytopenia ([facility] [address]    Cirrhosis of liver without ascites, unspecified hepatic cirrhosis type [facility])    Care plan discussed with patient    Coordination of complex care    Personal history of lung cancer       Thrombocytopenia (at least since December 2018) Apparently, the patient has a history of hepatitis C treated with Milagro Hock. CT chest performed at [facility] showed cirrhotic liver  The patient has a known history of esophageal varices and is seen by Dr. Yash Batista. The most likely reason for her thrombocytopenia is cirrhosis. She certainly has a history of portal hypertension and therefore may have congestive splenomegaly as well. In any case we will check her HCV quantitative/HIV today. I reviewed her medication list and saw no real culprit for thrombocytopenia at this time except omeprazole may occasionally cause thrombocytopenia. Cirrhosis of the liverlikely secondary to hepatitis C. She has evidence of portal hypertension. She is being seen by Dr. Yash Batista. She has esophageal varices. Hepatitis Cwe will check HCV quantitative today. History of Stage IB undifferentiated NSCLC lung cancer of left upper lobe diagnosed May 2008; [id number]  Patient quit smoking about 12 years ago. Last CT chest showed no evidence of disease recurrence or any new lung malignancy. Recommended CT low-dose lung cancer screening annually for 3 more years as per guidelines. Next CT low-dose lung cancer screening to be ordered for January 2022. PLAN:  Recommend CT low dose chest after 1/9/22  US spleen  Hepatitis C, HIV today  RTC with COLTON Collazo 6 months      I, Svetlana Gaxiola, am scribing for Fabricio Barber MD. Electronically signed by Svetlana Gaxiola RN on 1/26/2021 at 1:54 PM CST. I, Dr Shelly Vela, personally performed the services described in this documentation as scribed by Svetlana Gaxiola RN in my presence and is both accurate and complete. I have seen, examined and reviewed this patient medication list, appropriate labs and imaging studies. I reviewed relevant medical records and others physicians notes. I discussed the plans of care with the patient. I answered all the questions to the patients satisfaction. I have also reviewed the chief complaint (CC) and part of the history (History of Present Illness (HPI), Past Family Social History [facility]), or Review of Systems (ROS) and made changes when appropriated. (Please note that portions of this note were completed with a voice recognition program. Efforts were made to edit the dictations but occasionally words are mis-transcribed.)  The total time(67 minutes) I spent to see the patient today includes at least one or more of the following: preparing to see the patient by reviewing prior tests, prior notes or other relevant information, performing appropriate independent examination and evaluation, counseling, ordering of medications, tests or procedures, referrals, communicating with other healthcare professionals when appropriated to coordinate care, documenting clinic information in the electronic medical record or other health records, independently interpreting results of tests, managing test results and communicating the results to the patient/family or caregiver.       César Mcgrath MD    01/26/21  2:34 PM

## 2021-01-29 ENCOUNTER — HOSPITAL ENCOUNTER (OUTPATIENT)
Dept: GENERAL RADIOLOGY | Age: 71
Discharge: HOME | End: 2021-01-29
Payer: MEDICARE

## 2021-01-29 DIAGNOSIS — Z86.19 HEPATITIS C VIRUS INFECTION CURED AFTER ANTIVIRAL DRUG THERAPY: ICD-10-CM

## 2021-01-29 DIAGNOSIS — D69.6 THROMBOCYTOPENIA (HCC): ICD-10-CM

## 2021-01-29 PROCEDURE — 76705 ECHO EXAM OF ABDOMEN: CPT

## 2021-08-18 DIAGNOSIS — D69.6 THROMBOCYTOPENIA (HCC): Primary | ICD-10-CM

## 2021-08-18 DIAGNOSIS — D69.6 TEMPORARY LOW PLATELET COUNT (HCC): ICD-10-CM

## 2021-08-19 ENCOUNTER — HOSPITAL ENCOUNTER (OUTPATIENT)
Dept: INFUSION THERAPY | Age: 71
Discharge: HOME | End: 2021-08-19
Payer: MEDICARE

## 2021-08-19 ENCOUNTER — OFFICE VISIT (OUTPATIENT)
Dept: HEMATOLOGY | Age: 71
End: 2021-08-19
Payer: MEDICARE

## 2021-08-19 VITALS
HEART RATE: 82 BPM | HEIGHT: 64 IN | WEIGHT: 179.5 LBS | SYSTOLIC BLOOD PRESSURE: 118 MMHG | BODY MASS INDEX: 30.64 KG/M2 | OXYGEN SATURATION: 94 % | DIASTOLIC BLOOD PRESSURE: 62 MMHG

## 2021-08-19 DIAGNOSIS — D69.6 TEMPORARY LOW PLATELET COUNT (HCC): ICD-10-CM

## 2021-08-19 DIAGNOSIS — Z86.19 HEPATITIS C VIRUS INFECTION CURED AFTER ANTIVIRAL DRUG THERAPY: ICD-10-CM

## 2021-08-19 DIAGNOSIS — K74.60 CIRRHOSIS OF LIVER WITHOUT ASCITES, UNSPECIFIED HEPATIC CIRRHOSIS TYPE (HCC): ICD-10-CM

## 2021-08-19 DIAGNOSIS — D69.6 THROMBOCYTOPENIA (HCC): Primary | ICD-10-CM

## 2021-08-19 DIAGNOSIS — I85.10 ESOPHAGEAL VARICES IN CIRRHOSIS (HCC): ICD-10-CM

## 2021-08-19 DIAGNOSIS — R16.1 SPLENOMEGALY: ICD-10-CM

## 2021-08-19 DIAGNOSIS — K74.60 ESOPHAGEAL VARICES IN CIRRHOSIS (HCC): ICD-10-CM

## 2021-08-19 DIAGNOSIS — Z85.118 PERSONAL HISTORY OF LUNG CANCER: ICD-10-CM

## 2021-08-19 DIAGNOSIS — Z71.89 COORDINATION OF COMPLEX CARE: ICD-10-CM

## 2021-08-19 DIAGNOSIS — D69.6 THROMBOCYTOPENIA (HCC): ICD-10-CM

## 2021-08-19 LAB
ALBUMIN SERPL-MCNC: 4.5 G/DL (ref 3.5–5.2)
ALP BLD-CCNC: 97 U/L (ref 35–104)
ALT SERPL-CCNC: 39 U/L (ref 9–52)
ANION GAP SERPL CALCULATED.3IONS-SCNC: 9 MMOL/L (ref 7–19)
AST SERPL-CCNC: 45 U/L (ref 14–36)
BASOPHILS ABSOLUTE: 0.03 K/UL (ref 0.01–0.08)
BASOPHILS RELATIVE PERCENT: 0.5 % (ref 0.1–1.2)
BILIRUB SERPL-MCNC: 1 MG/DL (ref 0.2–1.3)
BUN BLDV-MCNC: 12 MG/DL (ref 7–17)
CALCIUM SERPL-MCNC: 9.7 MG/DL (ref 8.4–10.2)
CHLORIDE BLD-SCNC: 102 MMOL/L (ref 98–111)
CO2: 33 MMOL/L (ref 22–29)
CREAT SERPL-MCNC: 0.6 MG/DL (ref 0.5–1)
EOSINOPHILS ABSOLUTE: 0.16 K/UL (ref 0.04–0.54)
EOSINOPHILS RELATIVE PERCENT: 2.8 % (ref 0.7–7)
GFR NON-AFRICAN AMERICAN: >60
GLOBULIN: 3.6 G/DL
GLUCOSE BLD-MCNC: 111 MG/DL (ref 74–106)
HCT VFR BLD CALC: 41.7 % (ref 34.1–44.9)
HEMOGLOBIN: 13.5 G/DL (ref 11.2–15.7)
LYMPHOCYTES ABSOLUTE: 1.21 K/UL (ref 1.18–3.74)
LYMPHOCYTES RELATIVE PERCENT: 20.8 % (ref 19.3–53.1)
MCH RBC QN AUTO: 32.1 PG (ref 25.6–32.2)
MCHC RBC AUTO-ENTMCNC: 32.4 G/DL (ref 32.3–35.5)
MCV RBC AUTO: 99.3 FL (ref 79.4–94.8)
MONOCYTES ABSOLUTE: 0.5 K/UL (ref 0.24–0.82)
MONOCYTES RELATIVE PERCENT: 8.6 % (ref 4.7–12.5)
NEUTROPHILS ABSOLUTE: 3.91 K/UL (ref 1.56–6.13)
NEUTROPHILS RELATIVE PERCENT: 67.3 % (ref 34–71.1)
PDW BLD-RTO: 13 % (ref 11.7–14.4)
PLATELET # BLD: 94 K/UL (ref 182–369)
PMV BLD AUTO: 10.8 FL (ref 7.4–10.4)
POTASSIUM SERPL-SCNC: 3.5 MMOL/L (ref 3.5–5.1)
RBC # BLD: 4.2 M/UL (ref 3.93–5.22)
SODIUM BLD-SCNC: 144 MMOL/L (ref 137–145)
TOTAL PROTEIN: 8.1 G/DL (ref 6.3–8.2)
WBC # BLD: 5.81 K/UL (ref 3.98–10.04)

## 2021-08-19 PROCEDURE — G8400 PT W/DXA NO RESULTS DOC: HCPCS | Performed by: NURSE PRACTITIONER

## 2021-08-19 PROCEDURE — G8417 CALC BMI ABV UP PARAM F/U: HCPCS | Performed by: NURSE PRACTITIONER

## 2021-08-19 PROCEDURE — 1123F ACP DISCUSS/DSCN MKR DOCD: CPT | Performed by: NURSE PRACTITIONER

## 2021-08-19 PROCEDURE — 99212 OFFICE O/P EST SF 10 MIN: CPT

## 2021-08-19 PROCEDURE — 80053 COMPREHEN METABOLIC PANEL: CPT

## 2021-08-19 PROCEDURE — 36415 COLL VENOUS BLD VENIPUNCTURE: CPT

## 2021-08-19 PROCEDURE — 99204 OFFICE O/P NEW MOD 45 MIN: CPT | Performed by: NURSE PRACTITIONER

## 2021-08-19 PROCEDURE — 3017F COLORECTAL CA SCREEN DOC REV: CPT | Performed by: NURSE PRACTITIONER

## 2021-08-19 PROCEDURE — 4040F PNEUMOC VAC/ADMIN/RCVD: CPT | Performed by: NURSE PRACTITIONER

## 2021-08-19 PROCEDURE — 85025 COMPLETE CBC W/AUTO DIFF WBC: CPT

## 2021-08-19 PROCEDURE — 1036F TOBACCO NON-USER: CPT | Performed by: NURSE PRACTITIONER

## 2021-08-19 PROCEDURE — G8428 CUR MEDS NOT DOCUMENT: HCPCS | Performed by: NURSE PRACTITIONER

## 2021-08-19 PROCEDURE — 1090F PRES/ABSN URINE INCON ASSESS: CPT | Performed by: NURSE PRACTITIONER

## 2021-08-19 RX ORDER — OMEPRAZOLE 20 MG/1
CAPSULE, DELAYED RELEASE ORAL
COMMUNITY
Start: 2021-07-20

## 2021-08-19 NOTE — PROGRESS NOTES
Progress Note      Pt Name: Marely Zavaleta  YOB: 1950  MRN: [id number]    Date of evaluation: 8/19/2021  History Obtained From:  patient, electronic medical record    CHIEF COMPLAINT:    Chief Complaint   Patient presents with    Follow-up    Discuss Labs     Chronic thrombocytopenia    Cancer     History of lung    Pain     Abdominal     HISTORY OF PRESENT ILLNESS:    Marely Zavaleta is a 80 y.o.  female who is currently being followed for stage Ib undifferentiated non-small cell lung carcinoma, initially diagnosed in 2008 with no known recurrent disease, vaginal intraepithelial neoplasm grade 1, 2016 and thrombocytopenia secondary to cirrhosis of the liver. [facility] returns today in scheduled follow-up for evaluation, lab monitoring and further treatment recommendations. She presents today with chronic complaint of right upper quadrant flank pain with nausea. She denies any active bleeding to include melena, epistaxis, hematuria or hematochezia. She denies any respiratory complaints. Since her last follow-up, she had an EGD, 4/20/2021, by Dr. Dolly Polk for her history of esophageal varices due to cirrhosis. The EGD revealed grade 3 varices that required banding. Gastropathy and gastric ulceration were also identified, tissue was negative for H. Pylori. I reviewed Dr. Sobia Schroeder office note from 5/18/2021 that reported an AFP was requested and [facility] was instructed to resume her beta-blocker and continue omeprazole and Carafate. CBC is stable with a platelet count of 08,227.     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 Meléndez January was first seen by Dr. Rigoberto Clifford 1/26/2021. She was referred by her primary care provider for further work-up of thrombocytopenia. The patient denied having any knowledge of prior thrombocytopenia. She has a history of hepatitis C diagnosed many years ago. Apparently, she was initially treated with Ribavarin but had very poor tolerance. Later on she moved to [place] and was treated with Ceola Manger. She has been seen by Dr. Milton Scott in [facility]. The patient reported that she has esophageal varices. CT of the chest 1/9/2021 showed a cirrhotic liver. She denied any significant drinking. Review of prior CBC showed that she had mild in January 2018. · 12/28/2018CBC at [facility] showed WBC 3.89, hemoglobin 10.7, MCV 92, platelet counts 67,118. · 5/27/2020-CT scan of the abdomen and pelvis at [facility] reported splenomegaly with spleen measuring 18 cm. No hepatic or splenic mass. Esophageal varices associated with portal venous hypertension. · 01/09/2021- WBC 5.02, Hgb 14.2, Hct 41.2, Plts 99,000 at [facility]  · 1/9/2021-CTA of the chest reported no evidence of PE. Status post left pneumonectomy. Emphysema. Cirrhotic liver morphology  · 1/26/2021WBC 4.78, hemoglobin 13.4/, platelet counts 86,459  · 1/26/2021DrEstelita Aguirre discussed the findings of cirrhosis of the liver with the patient at initial consultation. · 1/29/2021-ultrasound the spleen revealed splenomegaly measuring 17.2 x 4.3 x 12.0 cm. No ascites within the left upper quadrant. No focal splenic mass. Age-appropriate routine health screening  1/18/2021-bilateral screening mammogram reported no mammographic evidence of malignancy.     Past Medical History:    Past Medical History:   Diagnosis Date    Cancer ([facility]     lung    COPD [facility]chronic obstructive pulmonary disease) ([facility]     GERD (gastroesophageal reflux disease)     History of hepatitis C     Hypertension     [facility]    CHUNG squamous cell     Thyroid disease     Varices, esophageal ([facility]        Past Surgical History:    Past Surgical History:   Procedure Laterality Date    CHOLECYSTECTOMY      CLAVICLE SURGERY Right     HYSTERECTOMY      JOINT REPLACEMENT Left     left knee replacement    KNEE SURGERY Right     LUNG REMOVAL, TOTAL Left     [place] REMOVE EYELID LESN (NOT CHALAZION) Bilateral 2018    blepharoplasty performed by Gabriela Pires MD at [facility]         Current Medications:    Current Outpatient Medications   Medication Sig Dispense Refill    omeprazole (PRILOSEC) 20 MG delayed release capsule TAKE 1 CAPSULE BY MOUTH ONCE DAILY 30 MINUTES BEFORE MORNING MEAL      levothyroxine (SYNTHROID) 125 MCG tablet Take 125 mcg by mouth Daily      metoprolol tartrate (LOPRESSOR) 25 MG tablet Take 25 mg by mouth 2 times daily      pantoprazole (PROTONIX) 40 MG tablet Take 40 mg by mouth daily      tiZANidine (ZANAFLEX) 4 MG tablet 1/4 tablet with meals 1/2 to whole tablet at bedtime 60 tablet 1     No current facility-administered medications for this visit. Allergies: Allergies   Allergen Reactions    Dilaudid [Hydromorphone Hcl]      hallucinations      Mirabegron Hives       Social History:    Social History     Tobacco Use    Smoking status: Former Smoker     Quit date: 2008     Years since quittin.6    Smokeless tobacco: Never Used   Vaping Use    Vaping Use: Never used   Substance Use Topics    Alcohol use:  Yes    Drug use: No       Family History:   Family History   Problem Relation Age of Onset   [facility] Cancer Sister         Melanoma left arm    Cancer Brother         rectal cancer    Diabetes Paternal Aunt     Coronary Art Dis Paternal Aunt     Cancer Maternal Grandmother         Retinal cancer with eye removal    Cancer Paternal Grandmother         Cancer around ear    Diabetes Paternal Grandmother        Vitals:  Vitals:    08/19/21 1352   BP: 118/62   Pulse: 82   SpO2: 94%   Weight: 179 lb 8 oz (81.4 kg)   Height: 5' 4\" (1.626 m)        Subjective   REVIEW OF SYSTEMS:   Review of Systems   Constitutional: Positive for fatigue (Chronic with no change). Negative for chills, diaphoresis and fever. HENT: Negative. Negative for congestion, ear pain, hearing loss, nosebleeds, sore throat and tinnitus. Eyes: Negative. Negative for pain, discharge and redness. Respiratory: Negative. Negative for cough, shortness of breath and wheezing. Cardiovascular: Negative. Negative for chest pain, palpitations and leg swelling. Gastrointestinal: Positive for abdominal pain (Chronic RUQ) and nausea (Overall controlled). Negative for blood in stool, constipation, diarrhea and vomiting. Endocrine: Negative for polydipsia. Genitourinary: Negative for dysuria, flank pain, frequency, hematuria and urgency. Musculoskeletal: Negative. Negative for back pain, myalgias and neck pain. Skin: Negative. Negative for rash. Neurological: Negative. Negative for dizziness, tremors, seizures, weakness and headaches. Hematological: Does not bruise/bleed easily. Psychiatric/Behavioral: Negative. The patient is not nervous/anxious. Objective   PHYSICAL EXAM:  Physical Exam  Vitals reviewed. Constitutional:       General: She is not in acute distress. Appearance: She is well-developed. She is not diaphoretic. HENT:      Head: Normocephalic and atraumatic. Mouth/Throat:      Pharynx: Uvula midline. Tonsils: No tonsillar exudate. Eyes:      General: Lids are normal. No scleral icterus. Right eye: No discharge. Left eye: No discharge. Conjunctiva/sclera: Conjunctivae normal.      Pupils: Pupils are equal, round, and reactive to light. Neck:      Thyroid: No thyroid mass or thyromegaly. Vascular: No JVD. Trachea: Trachea normal. No tracheal deviation. Cardiovascular:      Rate and Rhythm: Normal rate and regular rhythm. Heart sounds: Normal heart sounds. No murmur heard. No friction rub. No gallop. Pulmonary:      Effort: Pulmonary effort is normal. No respiratory distress. Breath sounds: Normal breath sounds. No wheezing or rales. Chest:      Chest wall: No tenderness. Abdominal:      General: Bowel sounds are normal. There is no distension. Palpations: Abdomen is soft. There is no mass. Tenderness: There is abdominal tenderness (RUQ with palpation). There is no guarding or rebound. Hernia: No hernia is present. Musculoskeletal:         General: No tenderness or deformity. Cervical back: Normal range of motion and neck supple. Comments: Range of motion within normal limits x4 extremities   Skin:     General: Skin is warm. Coloration: Skin is not pale. Findings: No erythema or rash. Neurological:      Mental Status: She is alert and oriented to person, place, and time. Cranial Nerves: No cranial nerve deficit. Coordination: Coordination normal.   Psychiatric:         Behavior: Behavior normal.         Thought Content:  Thought content normal.         Labs reviewed today:  Lab Results   Component Value Date    WBC 5.81 08/19/2021    HGB 13.5 08/19/2021    HCT 41.7 08/19/2021    MCV 99.3 (H) 08/19/2021    PLT 94 (L) 08/19/2021     Lab Results   Component Value Date    NEUTROABS 3.91 08/19/2021     Lab Results   Component Value Date     08/19/2021    K 3.5 08/19/2021     08/19/2021    CO2 33 (H) 08/19/2021    BUN 12 08/19/2021    CREATININE 0.6 08/19/2021    GLUCOSE 111 (H) 08/19/2021    CALCIUM 9.7 08/19/2021    PROT 8.1 08/19/2021    LABALBU 4.5 08/19/2021    BILITOT 1.0 08/19/2021    ALKPHOS 97 08/19/2021    AST 45 (H) 08/19/2021    ALT 39 08/19/2021    LABGLOM >60 08/19/2021    GLOB 3.6 08/19/2021 COVID19 and being at increased risk. Discussed proper handwashing to be done frequently, limit exposure to other individuals and maintain social distancing of 6 feet. Recommend contacting primary care provider if having respiratory symptoms for further recommendations and consideration for testing. EMR Dragon/Transcription disclaimer:   Much of this encounter note is an electronic transcription/translation of spoken language to printed text. The electronic translation of spoken language may permit erroneous, or at times, nonsensical words or phrases to be inadvertently transcribed; although attempts have made to review the note for such errors, some may still exist. Also, portions of this note have been copied forward, however, changed to reflect the most current clinical status of this patient.     Electronically signed by Merideth Frankel, APRN on 8/30/2021 at 11:13 AM

## 2021-08-26 DIAGNOSIS — Z87.891 HISTORY OF CIGARETTE SMOKING: Primary | ICD-10-CM

## 2021-08-30 ASSESSMENT — ENCOUNTER SYMPTOMS
EYE PAIN: 0
WHEEZING: 0
ABDOMINAL PAIN: 1
COUGH: 0
EYE REDNESS: 0
VOMITING: 0
NAUSEA: 1
BLOOD IN STOOL: 0
BACK PAIN: 0
CONSTIPATION: 0
DIARRHEA: 0
EYES NEGATIVE: 1
RESPIRATORY NEGATIVE: 1
SHORTNESS OF BREATH: 0
EYE DISCHARGE: 0
SORE THROAT: 0

## 2022-01-18 NOTE — PROGRESS NOTES
Progress Note      Pt Name: Stan Collins: 1950  MRN: [id number]    Date of evaluation: 01/19/2022  History Obtained From:  patient, electronic medical record    CHIEF COMPLAINT:    Chief Complaint   Patient presents with    Follow-up     Thrombocytopenia ([facility]    Cancer     History of lung cancer     HISTORY OF PRESENT ILLNESS:    Mindy Luna is a 80 y.o.  female who is currently being followed for stage Ib undifferentiated non-small cell lung carcinoma, initially diagnosed in 2008 with no known recurrent disease, vaginal intraepithelial neoplasm grade 1, 2016 and thrombocytopenia secondary to cirrhosis of the liver. Madisyn Stain returns today in scheduled follow-up for evaluation, lab monitoring, review CT scan results and further treatment recommendations. She presents today with complaints of cough and congestion which she suspects are secondary to a head cold. She denies any febrile illness. She reports easily bruising otherwise no noted bleeding. Madisyn Stain reports having some difficulty swallowing food due to banding for esophageal varices, she can swallow soft foods or liquids in small amounts without difficulty. 01/18/2022 CT chest low dose [facility])- showed mild emphysema; no suspicious mass or nodule. Lung RADS category 1. 1 year follow-up recommended    Her chronic thrombocytopenia is stable with a platelet count of 41,160 today.     ONCOLOGIC HISTORY:     Diagnosis  · History of Stage IB undifferentiated NSCLC lung cancer of left upper lobe diagnosed May 2008; [id number]  · History of VAIN 1 (vaginal intraepithelial neoplasia grade 1) diagnosed August 2016  · Thrombocytopenia (likely due to cirrhosis/splenomegaly)  · Hep C history  · Cirrhosis of the liver  · Esophageal varices     Treatment Summary  · 05/2008- Total Left Pneumonectomy followed by 1 cycle of adjuvant Cisplatin/Taxotere (discontinued due to toxicity)  · 02/2017- Laser ablation of vagina with Dr. Maria Elena Ambrose  was first seen by Dr. Gertrude North 1/26/2021. She was referred by her primary care provider for further work-up of thrombocytopenia. The patient denied having any knowledge of prior thrombocytopenia. She has a history of hepatitis C diagnosed many years ago. Apparently, she was initially treated with Ribavarin but had very poor tolerance. Later on she moved to [address] [place] [address] and was treated with Ferna Searing. She has been seen by Dr. Zoraida Mcclendon in [facility]. The patient reported that she has esophageal varices. CT of the chest 1/9/2021 showed a cirrhotic liver. She denied any significant drinking. Review of prior CBC showed that she had mild in January 2018. · 12/28/2018CBC at [facility] showed WBC 3.89, hemoglobin 10.7, MCV 92, platelet counts 87,004. · 5/27/2020-CT scan of the abdomen and pelvis at [facility] reported splenomegaly with spleen measuring 18 cm. No hepatic or splenic mass. Esophageal varices associated with portal venous hypertension. · 01/09/2021- WBC 5.02, Hgb 14.2, Hct 41.2, Plts 99,000 at [facility]  · 1/9/2021-CTA of the chest reported no evidence of PE. Status post left pneumonectomy. Emphysema. Cirrhotic liver morphology  · 1/26/2021WBC 4.78, hemoglobin 13.4/, platelet counts 58,030  · 1/26/2021Dr. Timothy discussed the findings of cirrhosis of the liver with the patient at initial consultation. · 1/29/2021-ultrasound the spleen revealed splenomegaly measuring 17.2 x 4.3 x 12.0 cm. No ascites within the left upper quadrant. No focal splenic mass. · 01/18/2022 CT chest low dose [facility])- showed mild emphysema; no suspicious mass or nodule. Lung RADS category 1. 1 year follow-up recommended. Age-appropriate routine health screening  1/18/2021-bilateral screening mammogram reported no mammographic evidence of malignancy.     Past Medical History:    Past Medical History:   Diagnosis Date    Cancer ([address]     lung    COPD (chronic obstructive pulmonary disease) (HCC)     GERD (gastroesophageal reflux disease)     History of hepatitis C     Hypertension     Lung cancer ([facility] 2008    CHUNG squamous cell     Thyroid disease     Varices, esophageal (HCC)        Past Surgical History:    Past Surgical History:   Procedure Laterality Date    CHOLECYSTECTOMY      CLAVICLE SURGERY Right     HYSTERECTOMY      JOINT REPLACEMENT Left     left knee replacement    KNEE SURGERY Right     LUNG REMOVAL, TOTAL Left     [place] REMOVE EYELID LESN (NOT CHALAZION) Bilateral 2018    blepharoplasty performed by Jim Chu MD at [address] [place] [address]         Current Medications:    Current Outpatient Medications   Medication Sig Dispense Refill    omeprazole (PRILOSEC) 20 MG delayed release capsule TAKE 1 CAPSULE BY MOUTH ONCE DAILY 30 MINUTES BEFORE MORNING MEAL      levothyroxine (SYNTHROID) 125 MCG tablet Take 125 mcg by mouth Daily      pantoprazole (PROTONIX) 40 MG tablet Take 40 mg by mouth daily      metoprolol tartrate (LOPRESSOR) 25 MG tablet Take 25 mg by mouth 2 times daily (Patient not taking: Reported on 2022)      tiZANidine (ZANAFLEX) 4 MG tablet 1/4 tablet with meals 1/2 to whole tablet at bedtime (Patient not taking: Reported on 2022) 60 tablet 1     No current facility-administered medications for this visit. Allergies: Allergies   Allergen Reactions    Dilaudid [Hydromorphone Hcl]      hallucinations      Mirabegron Hives       Social History:    Social History     Tobacco Use    Smoking status: Former Smoker     Quit date: 2008     Years since quittin.0    Smokeless tobacco: Never Used   Vaping Use    Vaping Use: Never used   Substance Use Topics    Alcohol use:  Yes    Drug use: No       Family History:   Family History   Problem Relation Age of Onset   [facility] Cancer Sister         Melanoma left arm    Cancer Brother         rectal cancer    Diabetes Paternal Aunt     Coronary Art Dis Paternal Aunt     Cancer Maternal Grandmother         Retinal cancer with eye removal    Cancer Paternal Grandmother         Cancer around ear    Diabetes Paternal Grandmother        Vitals:  Vitals:    01/19/22 1421   BP: 122/60   Pulse: 79   SpO2: 97%   Weight: 180 lb 6.4 oz (81.8 kg)   Height: 5' 4\" (1.626 m)        Subjective   REVIEW OF SYSTEMS:   Review of Systems   Constitutional: Positive for fatigue. Negative for chills, diaphoresis and fever. HENT: Negative. Negative for congestion, ear pain, hearing loss, nosebleeds, sore throat and tinnitus. Eyes: Negative. Negative for pain, discharge and redness. Respiratory: Positive for cough (congestion due to cold) and shortness of breath. Negative for wheezing. Cardiovascular: Negative. Negative for chest pain, palpitations and leg swelling. Gastrointestinal: Negative. Negative for abdominal pain, blood in stool, constipation, diarrhea, nausea and vomiting. Endocrine: Negative for polydipsia. Genitourinary: Negative for dysuria, flank pain, frequency, hematuria and urgency. Musculoskeletal: Negative. Negative for back pain, myalgias and neck pain. Skin: Negative. Negative for rash. Neurological: Negative. Negative for dizziness, tremors, seizures, weakness and headaches. Hematological: Bruises/bleeds easily. Psychiatric/Behavioral: Negative. The patient is not nervous/anxious. Objective   PHYSICAL EXAM:  Physical Exam  Vitals reviewed. Constitutional:       General: She is not in acute distress. Appearance: She is well-developed. She is not diaphoretic. HENT:      Head: Normocephalic and atraumatic. Mouth/Throat:      Pharynx: Uvula midline. Tonsils: No tonsillar exudate. Eyes:      General: Lids are normal. No scleral icterus. Right eye: No discharge. Left eye: No discharge.       Conjunctiva/sclera: Conjunctivae normal.      Pupils: Pupils are equal, round, and reactive to light.   Neck:      Thyroid: No thyroid mass or thyromegaly. Vascular: No JVD. Trachea: Trachea normal. No tracheal deviation. Cardiovascular:      Rate and Rhythm: Normal rate and regular rhythm. Heart sounds: Normal heart sounds. No murmur heard. No friction rub. No gallop. Pulmonary:      Effort: Pulmonary effort is normal. No respiratory distress. Breath sounds: Normal breath sounds. No wheezing or rales. Chest:      Chest wall: No tenderness. Abdominal:      General: Bowel sounds are normal. There is no distension. Palpations: Abdomen is soft. There is no mass. Tenderness: There is no abdominal tenderness. There is no guarding or rebound. Hernia: No hernia is present. Musculoskeletal:         General: No tenderness or deformity. Cervical back: Normal range of motion and neck supple. Comments: Range of motion within normal limits x4 extremities   Skin:     General: Skin is warm. Coloration: Skin is not pale. Findings: No erythema or rash. Neurological:      Mental Status: She is alert and oriented to person, place, and time. Cranial Nerves: No cranial nerve deficit. Coordination: Coordination normal.   Psychiatric:         Behavior: Behavior normal.         Thought Content:  Thought content normal.       Labs reviewed today:  Lab Results   Component Value Date    WBC 4.82 01/19/2022    HGB 13.7 01/19/2022    HCT 40.1 01/19/2022    MCV 95.2 (H) 01/19/2022    PLT 97 (L) 01/19/2022     Lab Results   Component Value Date    NEUTROABS 3.91 08/19/2021     Lab Results   Component Value Date     08/19/2021    K 3.5 08/19/2021     08/19/2021    CO2 33 (H) 08/19/2021    BUN 12 08/19/2021    CREATININE 0.6 08/19/2021    GLUCOSE 111 (H) 08/19/2021    CALCIUM 9.7 08/19/2021    PROT 8.1 08/19/2021    LABALBU 4.5 08/19/2021    BILITOT 1.0 08/19/2021    ALKPHOS 97 08/19/2021    AST 45 (H) 08/19/2021    ALT 39 08/19/2021    LABGLOM >60 08/19/2021    GLOB 3.6 08/19/2021       ASSESSMENT/PLAN:      1. Thrombocytopenia ([address] secondary to splenomegaly, portal hypertension and cirrhosis of the liver, platelet count is stable at 97,000. Denies any bleeding to include melena, epistaxis, hemoptysis, hematuria or hematochezia. 1/29/2021-ultrasound the spleen revealed splenomegaly measuring 17.2 x 4.3 x 12.0 cm  -Continue conservative monitoring    2. Cirrhosis of liver with esophageal varices grade 3, followed by Dr. Giovanni Howard. Recently had banding of esophageal varices and now having some difficulty swallowing solid foods. - Comprehensive Metabolic Panel; Future  -Follow-up with Dr. Giovanni Howard as recommended    3. Stage IB undifferentiated NSCLC lung cancer of left upper lobe, [id number] diagnosed May 2008.    01/18/2022 CT chest low dose [facility])- showed mild emphysema; no suspicious mass or nodule. Lung RADS category 1. 1 year follow-up recommended    Symptomatic with cough and congestion today, she suspects this to be secondary to a cold. 4. Hepatitis C virus infection cured after antiviral drug therapy. HCV RNA PCR not detected on 1/26/2021. HIV1+2 [id number]  nonreactive on 1/26/2021.    -Received Linus Mcgraw and is followed by Dr. Giovanni Howard     5. Annual breast screening, denies any breast complaints  -Schedule annual bilateral screening mammogram    I discussed all of the above findings included in the assessment and plan with the patient and the patient is in agreement to move forward with current recommendations/treatment. I have addressed all of their questions and concerns that were verbalized. FOLLOW UP:  1. Follow-up appointment given for 6 months, sooner if needed  2. Continue to follow with other medical providers as recommended  3. Labs at next visit: CMP and CBC    EMR Dragon/Transcription disclaimer:   Much of this encounter note is an electronic transcription/translation of spoken language to printed text.  The electronic translation of spoken language may permit erroneous, or at times, nonsensical words or phrases to be inadvertently transcribed; although attempts have made to review the note for such errors, some may still exist.  Please excuse any unrecognized transcription errors and contact us if the air is unintelligible or needs documented correction. Also, portions of this note have been copied forward, however, changed to reflect the most current clinical status of this patient. Electronically signed by COLTON Palm on 2/3/2022 at 10:08 AM  Shilo SAUL am pre-charting as a registered nurse for COLTON Mccann.

## 2022-01-19 ENCOUNTER — HOSPITAL ENCOUNTER (OUTPATIENT)
Dept: INFUSION THERAPY | Age: 72
Discharge: HOME | End: 2022-01-19
Payer: MEDICARE

## 2022-01-19 ENCOUNTER — OFFICE VISIT (OUTPATIENT)
Dept: HEMATOLOGY | Age: 72
End: 2022-01-19
Payer: MEDICARE

## 2022-01-19 VITALS
SYSTOLIC BLOOD PRESSURE: 122 MMHG | HEIGHT: 64 IN | WEIGHT: 180.4 LBS | OXYGEN SATURATION: 97 % | BODY MASS INDEX: 30.8 KG/M2 | DIASTOLIC BLOOD PRESSURE: 60 MMHG | HEART RATE: 79 BPM

## 2022-01-19 DIAGNOSIS — D69.6 TEMPORARY LOW PLATELET COUNT (HCC): ICD-10-CM

## 2022-01-19 DIAGNOSIS — D69.6 THROMBOCYTOPENIA (HCC): ICD-10-CM

## 2022-01-19 DIAGNOSIS — R16.1 SPLENOMEGALY: ICD-10-CM

## 2022-01-19 DIAGNOSIS — Z12.39 SCREENING BREAST EXAMINATION: Primary | ICD-10-CM

## 2022-01-19 DIAGNOSIS — K74.60 CIRRHOSIS OF LIVER WITHOUT ASCITES, UNSPECIFIED HEPATIC CIRRHOSIS TYPE (HCC): ICD-10-CM

## 2022-01-19 DIAGNOSIS — I85.10 ESOPHAGEAL VARICES IN CIRRHOSIS (HCC): ICD-10-CM

## 2022-01-19 DIAGNOSIS — Z12.31 ENCOUNTER FOR SCREENING MAMMOGRAM FOR MALIGNANT NEOPLASM OF BREAST: ICD-10-CM

## 2022-01-19 DIAGNOSIS — K74.60 ESOPHAGEAL VARICES IN CIRRHOSIS (HCC): ICD-10-CM

## 2022-01-19 DIAGNOSIS — Z85.118 PERSONAL HISTORY OF LUNG CANCER: ICD-10-CM

## 2022-01-19 LAB
HCT VFR BLD CALC: 40.1 % (ref 34.1–44.9)
HEMOGLOBIN: 13.7 G/DL (ref 11.2–15.7)
MCH RBC QN AUTO: 32.5 PG (ref 25.6–32.2)
MCHC RBC AUTO-ENTMCNC: 34.2 G/DL (ref 32.3–35.5)
MCV RBC AUTO: 95.2 FL (ref 79.4–94.8)
PDW BLD-RTO: 13.2 % (ref 11.7–14.4)
PLATELET # BLD: 97 K/UL (ref 182–369)
PMV BLD AUTO: 10 FL (ref 7.4–10.4)
RBC # BLD: 4.21 M/UL (ref 3.93–5.22)
WBC # BLD: 4.82 K/UL (ref 3.98–10.04)

## 2022-01-19 PROCEDURE — 85027 COMPLETE CBC AUTOMATED: CPT

## 2022-01-19 PROCEDURE — G8484 FLU IMMUNIZE NO ADMIN: HCPCS | Performed by: NURSE PRACTITIONER

## 2022-01-19 PROCEDURE — 4040F PNEUMOC VAC/ADMIN/RCVD: CPT | Performed by: NURSE PRACTITIONER

## 2022-01-19 PROCEDURE — 99213 OFFICE O/P EST LOW 20 MIN: CPT | Performed by: NURSE PRACTITIONER

## 2022-01-19 PROCEDURE — 36415 COLL VENOUS BLD VENIPUNCTURE: CPT

## 2022-01-19 PROCEDURE — 1036F TOBACCO NON-USER: CPT | Performed by: NURSE PRACTITIONER

## 2022-01-19 PROCEDURE — G8417 CALC BMI ABV UP PARAM F/U: HCPCS | Performed by: NURSE PRACTITIONER

## 2022-01-19 PROCEDURE — 3017F COLORECTAL CA SCREEN DOC REV: CPT | Performed by: NURSE PRACTITIONER

## 2022-01-19 PROCEDURE — 99212 OFFICE O/P EST SF 10 MIN: CPT

## 2022-01-19 PROCEDURE — 1123F ACP DISCUSS/DSCN MKR DOCD: CPT | Performed by: NURSE PRACTITIONER

## 2022-01-19 PROCEDURE — 1090F PRES/ABSN URINE INCON ASSESS: CPT | Performed by: NURSE PRACTITIONER

## 2022-01-19 PROCEDURE — G8427 DOCREV CUR MEDS BY ELIG CLIN: HCPCS | Performed by: NURSE PRACTITIONER

## 2022-01-19 PROCEDURE — G8400 PT W/DXA NO RESULTS DOC: HCPCS | Performed by: NURSE PRACTITIONER

## 2022-01-19 ASSESSMENT — ENCOUNTER SYMPTOMS
SHORTNESS OF BREATH: 1
COUGH: 1

## 2022-02-03 ASSESSMENT — ENCOUNTER SYMPTOMS
DIARRHEA: 0
EYE PAIN: 0
BLOOD IN STOOL: 0
EYES NEGATIVE: 1
BACK PAIN: 0
NAUSEA: 0
VOMITING: 0
GASTROINTESTINAL NEGATIVE: 1
WHEEZING: 0
EYE REDNESS: 0
ABDOMINAL PAIN: 0
CONSTIPATION: 0
SORE THROAT: 0
EYE DISCHARGE: 0

## 2022-02-10 ENCOUNTER — TELEPHONE (OUTPATIENT)
Dept: INFUSION THERAPY | Age: 72
End: 2022-02-10

## 2022-02-10 NOTE — TELEPHONE ENCOUNTER
Patient called office with c/o dizziness when she gets out of bed and when she bends over to pick something up. States that this just started and was concerned that her low white was causing this. Instructed that her low white count would not cause her to be dizzy and that COLTON Wagner would like for her to see her PCP this week. Patient v/u.

## 2022-02-11 DIAGNOSIS — K74.60 CIRRHOSIS OF LIVER WITHOUT ASCITES, UNSPECIFIED HEPATIC CIRRHOSIS TYPE (HCC): ICD-10-CM

## 2022-02-11 DIAGNOSIS — D69.6 THROMBOCYTOPENIA (HCC): Primary | ICD-10-CM

## 2022-02-11 DIAGNOSIS — R16.1 SPLENOMEGALY: ICD-10-CM

## 2022-02-11 NOTE — PROGRESS NOTES
Called patient to see if she could get in with her PCP. States that she has an appointment with her next week. Instructed that COLTON Stephens would like for her to get some blood work today. Patient states that she lives close to [facility] and would like to do it there. Orders faxed to hospital.  Patient v/u. Received lab results and COLTON Stephens reviewed results. Called patient and informed that they are all within normal limits. Instructed to keep appointment with PCP next week and to go to ER if symptoms get worse. Patient v/u.

## 2022-03-05 ENCOUNTER — APPOINTMENT (OUTPATIENT)
Dept: ULTRASOUND IMAGING | Facility: HOSPITAL | Age: 72
End: 2022-03-05

## 2022-03-05 ENCOUNTER — NURSE TRIAGE (OUTPATIENT)
Dept: CALL CENTER | Facility: HOSPITAL | Age: 72
End: 2022-03-05

## 2022-03-05 ENCOUNTER — HOSPITAL ENCOUNTER (EMERGENCY)
Facility: HOSPITAL | Age: 72
Discharge: HOME | End: 2022-03-05
Attending: EMERGENCY MEDICINE | Admitting: EMERGENCY MEDICINE

## 2022-03-05 VITALS
WEIGHT: 184 LBS | BODY MASS INDEX: 31.41 KG/M2 | HEIGHT: 64 IN | HEART RATE: 72 BPM | TEMPERATURE: 98.8 F | DIASTOLIC BLOOD PRESSURE: 67 MMHG | OXYGEN SATURATION: 98 % | RESPIRATION RATE: 18 BRPM | SYSTOLIC BLOOD PRESSURE: 148 MMHG

## 2022-03-05 DIAGNOSIS — M79.604 PAIN IN BOTH LOWER EXTREMITIES: Primary | ICD-10-CM

## 2022-03-05 DIAGNOSIS — M79.605 PAIN IN BOTH LOWER EXTREMITIES: Primary | ICD-10-CM

## 2022-03-05 LAB
ALBUMIN SERPL-MCNC: 4.5 G/DL (ref 3.5–5.2)
ALBUMIN/GLOB SERPL: 1.3 G/DL
ALP SERPL-CCNC: 95 U/L (ref 39–117)
ALT SERPL W P-5'-P-CCNC: 29 U/L (ref 1–33)
AMMONIA BLD-SCNC: 44 UMOL/L (ref 11–51)
ANION GAP SERPL CALCULATED.3IONS-SCNC: 8 MMOL/L (ref 5–15)
AST SERPL-CCNC: 31 U/L (ref 1–32)
BASOPHILS # BLD AUTO: 0.04 10*3/MM3 (ref 0–0.2)
BASOPHILS NFR BLD AUTO: 0.9 % (ref 0–1.5)
BILIRUB SERPL-MCNC: 0.6 MG/DL (ref 0–1.2)
BUN SERPL-MCNC: 14 MG/DL (ref 8–23)
BUN/CREAT SERPL: 21.5 (ref 7–25)
CALCIUM SPEC-SCNC: 9.8 MG/DL (ref 8.6–10.5)
CHLORIDE SERPL-SCNC: 105 MMOL/L (ref 98–107)
CO2 SERPL-SCNC: 28 MMOL/L (ref 22–29)
CREAT SERPL-MCNC: 0.65 MG/DL (ref 0.57–1)
D DIMER PPP FEU-MCNC: 1.07 MG/L (FEU) (ref 0–0.5)
DEPRECATED RDW RBC AUTO: 45.1 FL (ref 37–54)
EGFRCR SERPLBLD CKD-EPI 2021: 94.9 ML/MIN/1.73
EOSINOPHIL # BLD AUTO: 0.11 10*3/MM3 (ref 0–0.4)
EOSINOPHIL NFR BLD AUTO: 2.5 % (ref 0.3–6.2)
ERYTHROCYTE [DISTWIDTH] IN BLOOD BY AUTOMATED COUNT: 13.2 % (ref 12.3–15.4)
GLOBULIN UR ELPH-MCNC: 3.5 GM/DL
GLUCOSE SERPL-MCNC: 90 MG/DL (ref 65–99)
HCT VFR BLD AUTO: 39.1 % (ref 34–46.6)
HGB BLD-MCNC: 13 G/DL (ref 12–15.9)
HOLD SPECIMEN: NORMAL
LYMPHOCYTES # BLD AUTO: 1.1 10*3/MM3 (ref 0.7–3.1)
LYMPHOCYTES NFR BLD AUTO: 25.2 % (ref 19.6–45.3)
MAGNESIUM SERPL-MCNC: 1.9 MG/DL (ref 1.6–2.4)
MCH RBC QN AUTO: 31.8 PG (ref 26.6–33)
MCHC RBC AUTO-ENTMCNC: 33.2 G/DL (ref 31.5–35.7)
MCV RBC AUTO: 95.6 FL (ref 79–97)
MONOCYTES # BLD AUTO: 0.57 10*3/MM3 (ref 0.1–0.9)
MONOCYTES NFR BLD AUTO: 13.1 % (ref 5–12)
NEUTROPHILS NFR BLD AUTO: 2.54 10*3/MM3 (ref 1.7–7)
NEUTROPHILS NFR BLD AUTO: 58.3 % (ref 42.7–76)
NT-PROBNP SERPL-MCNC: 144.9 PG/ML (ref 0–900)
PLATELET # BLD AUTO: 90 10*3/MM3 (ref 140–450)
PMV BLD AUTO: 10.7 FL (ref 6–12)
POTASSIUM SERPL-SCNC: 4 MMOL/L (ref 3.5–5.2)
PROT SERPL-MCNC: 8 G/DL (ref 6–8.5)
RBC # BLD AUTO: 4.09 10*6/MM3 (ref 3.77–5.28)
SODIUM SERPL-SCNC: 141 MMOL/L (ref 136–145)
T4 FREE SERPL-MCNC: 1.45 NG/DL (ref 0.93–1.7)
TSH SERPL DL<=0.05 MIU/L-ACNC: 1.04 UIU/ML (ref 0.27–4.2)
WBC NRBC COR # BLD: 4.36 10*3/MM3 (ref 3.4–10.8)

## 2022-03-05 PROCEDURE — 85379 FIBRIN DEGRADATION QUANT: CPT | Performed by: EMERGENCY MEDICINE

## 2022-03-05 PROCEDURE — 93010 ELECTROCARDIOGRAM REPORT: CPT | Performed by: INTERNAL MEDICINE

## 2022-03-05 PROCEDURE — 93005 ELECTROCARDIOGRAM TRACING: CPT | Performed by: EMERGENCY MEDICINE

## 2022-03-05 PROCEDURE — 36415 COLL VENOUS BLD VENIPUNCTURE: CPT

## 2022-03-05 PROCEDURE — 83880 ASSAY OF NATRIURETIC PEPTIDE: CPT | Performed by: EMERGENCY MEDICINE

## 2022-03-05 PROCEDURE — 99283 EMERGENCY DEPT VISIT LOW MDM: CPT

## 2022-03-05 PROCEDURE — 93970 EXTREMITY STUDY: CPT

## 2022-03-05 PROCEDURE — 84443 ASSAY THYROID STIM HORMONE: CPT | Performed by: EMERGENCY MEDICINE

## 2022-03-05 PROCEDURE — 85025 COMPLETE CBC W/AUTO DIFF WBC: CPT | Performed by: EMERGENCY MEDICINE

## 2022-03-05 PROCEDURE — 84439 ASSAY OF FREE THYROXINE: CPT | Performed by: EMERGENCY MEDICINE

## 2022-03-05 PROCEDURE — 83735 ASSAY OF MAGNESIUM: CPT | Performed by: EMERGENCY MEDICINE

## 2022-03-05 PROCEDURE — 93970 EXTREMITY STUDY: CPT | Performed by: SURGERY

## 2022-03-05 PROCEDURE — 82140 ASSAY OF AMMONIA: CPT | Performed by: EMERGENCY MEDICINE

## 2022-03-05 PROCEDURE — 80053 COMPREHEN METABOLIC PANEL: CPT | Performed by: EMERGENCY MEDICINE

## 2022-03-05 RX ORDER — SODIUM CHLORIDE 0.9 % (FLUSH) 0.9 %
10 SYRINGE (ML) INJECTION AS NEEDED
Status: DISCONTINUED | OUTPATIENT
Start: 2022-03-05 | End: 2022-03-06 | Stop reason: HOSPADM

## 2022-03-05 NOTE — TELEPHONE ENCOUNTER
Reason for Disposition  • [1] Numbness or tingling on both sides of body AND [2] is a new symptom present > 24 hours    Additional Information  • Negative: [1] SEVERE weakness (i.e., unable to walk or barely able to walk, requires support) AND [2] new-onset or worsening  • Negative: [1] Weakness (i.e., paralysis, loss of muscle strength) of the face, arm / hand, or leg / foot on one side of the body AND [2] sudden onset AND [3] present now (Exception: Bell's palsy suspected [i.e., weakness only one side of the face, developing over hours to days, no other symptoms])  • Negative: [1] Numbness (i.e., loss of sensation) of the face, arm / hand, or leg / foot on one side of the body AND [2] sudden onset AND [3] present now  • Negative: [1] Loss of speech or garbled speech AND [2] sudden onset AND [3] present now  • Negative: Difficult to awaken or acting confused (e.g., disoriented, slurred speech)  • Negative: Sounds like a life-threatening emergency to the triager  • Negative: Confusion, disorientation, or hallucinations is main symptom  • Negative: Neck pain is main symptom (and having weakness, numbness, or tingling in arm / hand because of neck pain)  • Negative: Back pain is main symptom (and having weakness, numbness, or tingling in leg because of back pain)  • Negative: Hand pain is main symptom (and having mild weakness, numbness, or tingling in hand related to hand pain)  • Negative: Dizziness is main symptom  • Negative: Vision loss or change is main symptom  • Negative: Followed a head injury within last 3 days  • Negative: Followed a neck injury within last 3 days  • Negative: [1] Tingling in both hands and/or feet AND [2] breathing faster than normal AND [3] feels similar to prior panic attack or hyperventilation episode  • Negative: Weakness in both sides of the body or weakness all over  • Negative: Headache  (and neurologic deficit)  • Negative: [1] Back pain AND [2] numbness (loss of sensation) in  groin or rectal area  • Negative: [1] Unable to urinate (or only a few drops) > 4 hours AND [2] bladder feels very full (e.g., palpable bladder or strong urge to urinate)  • Negative: [1] Loss of bladder or bowel control (urine or bowel incontinence; wetting self, leaking stool) AND [2] new-onset  • Negative: [1] Weakness (i.e., paralysis, loss of muscle strength) of the face, arm / hand, or leg / foot on one side of the body AND [2] sudden onset AND [3] brief (now gone)  • Negative: [1] Numbness (i.e., loss of sensation) of the face, arm / hand, or leg / foot on one side of the body AND [2] sudden onset AND [3] brief (now gone)  • Negative: [1] Loss of speech or garbled speech AND [2] sudden onset AND [3] brief (now gone)  • Negative: Bell's palsy suspected (i.e., weakness on only one side of the face, developing over hours to days, no other symptoms)  • Negative: Patient sounds very sick or weak to the triager  • Negative: Neck pain (and neurologic deficit)  • Negative: Back pain (and neurologic deficit)  • Negative: [1] Weakness of the face, arm / hand, or leg / foot on one side of the body AND [2] gradual onset (e.g., days to weeks) AND [3] present now  • Negative: [1] Numbness (i.e., loss of sensation) of the face, arm / hand, or leg / foot on one side of the body AND [2] gradual onset (e.g., days to weeks) AND [3] present now  • Negative: [1] Loss of speech or garbled speech AND [2] gradual onset (e.g., days to weeks) AND [3] present now  • Negative: [1] Tingling (e.g., pins and needles) of the face, arm / hand, or leg / foot on one side of the body AND [2] present now (Exceptions: chronic/recurrent symptom lasting > 4 weeks or tingling from known cause, such as: bumped elbow, carpal tunnel syndrome, pinched nerve, frostbite)  • Negative: [1] Loss of speech or garbled speech AND [2] is a chronic symptom (recurrent or ongoing AND present > 4 weeks)  • Negative: [1] Weakness of arm / hand, or leg / foot AND [2]  "is a chronic symptom (recurrent or ongoing AND present > 4 weeks)  • Negative: [1] Numbness or tingling in one or both hands AND [2] is a chronic symptom (recurrent or ongoing AND present > 4 weeks)  • Negative: [1] Numbness or tingling in one or both feet AND [2] is a chronic symptom (recurrent or ongoing AND present > 4 weeks)    Answer Assessment - Initial Assessment Questions  1. SYMPTOM: \"What is the main symptom you are concerned about?\" (e.g., weakness, numbness)      Hands and feet feel cold and numb bilaterally, when she walks her feet feel heavy and asleep  2. ONSET: \"When did this start?\" (minutes, hours, days; while sleeping)      yesterday  3. LAST NORMAL: \"When was the last time you were normal (no symptoms)?\"      Yesterday when she got up  4. PATTERN \"Does this come and go, or has it been constant since it started?\"  \"Is it present now?\"      constant  5. CARDIAC SYMPTOMS: \"Have you had any of the following symptoms: chest pain, difficulty breathing, palpitations?\"      None currently  6. NEUROLOGIC SYMPTOMS: \"Have you had any of the following symptoms: headache, dizziness, vision loss, double vision, changes in speech, unsteady on your feet?\"      No other neuro symptoms  7. OTHER SYMPTOMS: \"Do you have any other symptoms?\"      Has one lung, esophageal varicies.  She has had follow up with neuro and vascular but canceled appts. She states she fell a year ago and hit her head and lower back and seeing neuro for that but this is a new symptom.  8. PREGNANCY: \"Is there any chance you are pregnant?\" \"When was your last menstrual period?\"      na    Protocols used: NEUROLOGIC DEFICIT-ADULT-AH      "

## 2022-03-06 NOTE — ED PROVIDER NOTES
Subjective   Patient presents with a complaint of discomfort in both of her legs.  She says it started yesterday.  She says that it has been kind of aching diffusely and cannot seem to keep them still.  She feels like trouble swallowing in the last 2 days.  She also feels like at times they get cold and numb and also has noticed some coldness and numbness in her arms.  She mentioned to the triage nurse some shortness of breath but does not say that to me.  He has never had anything like this before.  She not had any recent injuries.      History provided by:  Patient   used: No    Leg Pain  Location:  Leg  Time since incident:  2 days  Injury: no    Leg location:  L leg and R leg  Pain details:     Quality:  Aching and burning    Radiates to:  Does not radiate    Severity:  Moderate    Onset quality:  Gradual    Duration:  2 days    Timing:  Intermittent    Progression:  Waxing and waning  Chronicity:  New  Dislocation: no    Foreign body present:  Unable to specify  Tetanus status:  Unknown  Prior injury to area:  No  Relieved by:  Nothing  Worsened by:  Nothing  Ineffective treatments:  None tried  Associated symptoms: swelling    Associated symptoms: no back pain, no decreased ROM, no fatigue, no fever, no itching, no muscle weakness, no neck pain, no numbness, no stiffness and no tingling    Risk factors: no concern for non-accidental trauma, no frequent fractures, no known bone disorder, no obesity and no recent illness        Review of Systems   Constitutional: Negative.  Negative for fatigue and fever.   HENT: Negative.    Respiratory: Negative.    Cardiovascular: Negative.    Gastrointestinal: Negative.    Genitourinary: Negative.    Musculoskeletal: Negative.  Negative for back pain, neck pain and stiffness.   Skin: Negative.  Negative for itching.   Neurological: Negative.    Psychiatric/Behavioral: Negative.    All other systems reviewed and are negative.      Past Medical History:    Diagnosis Date   • Cancer (HCC)     LUNG   • GERD (gastroesophageal reflux disease)    • Hyperthyroidism    • Infectious viral hepatitis     H/O HEPATITIS C   • Swelling of lower extremity    • Varices, esophageal (HCC)        Allergies   Allergen Reactions   • Dilaudid [Hydromorphone Hcl] Hallucinations       Past Surgical History:   Procedure Laterality Date   • CARDIAC CATHETERIZATION     • CLAVICLE SURGERY Right    • COLONOSCOPY     • DILATION AND CURETTAGE, DIAGNOSTIC / THERAPEUTIC     • HYSTERECTOMY      TOTAL ABDOMINAL HYSTERECTOMY   • KNEE ARTHROSCOPY Bilateral    • LUNG REMOVAL, TOTAL Left    • MYRINGOTOMY W/ TUBES     • THYROID BIOPSY      BENIGN   • TUBAL ABDOMINAL LIGATION      X2       Family History   Problem Relation Age of Onset   • Cancer Other    • Coronary artery disease Other    • Thyroid disease Other    • Melanoma Other    • Melanoma Sister         LEFT ARM   • Hypothyroidism Sister    • Cancer Brother    • Coronary artery disease Brother    • Hyperthyroidism Sister    • Other Mother    • Other Father    • Breast cancer Neg Hx        Social History     Socioeconomic History   • Marital status:    Tobacco Use   • Smoking status: Former Smoker     Packs/day: 1.50     Types: Cigarettes     Quit date:      Years since quittin.1   • Smokeless tobacco: Never Used   • Tobacco comment: LESS THAN 1 PPD FOR 12 YEARS   Substance and Sexual Activity   • Alcohol use: Yes     Comment: OCCASIONAL WINE   • Drug use: No   • Sexual activity: Defer     Partners: Male     Birth control/protection: None       Prior to Admission medications    Medication Sig Start Date End Date Taking? Authorizing Provider   levothyroxine (SYNTHROID, LEVOTHROID) 88 MCG tablet Take 88 mcg by mouth Daily.    Provider, MD Leticia   metoprolol tartrate (LOPRESSOR) 25 MG tablet Take 25 mg by mouth 2 (Two) Times a Day.    Provider, MD Leticia   tiZANidine (ZANAFLEX) 4 MG tablet Take 4 mg by mouth At Night As  Needed for Muscle Spasms.    Provider, MD Leticia       Medications   sodium chloride 0.9 % flush 10 mL (has no administration in time range)   sodium chloride 0.9 % flush 10 mL (has no administration in time range)       Vitals:    03/05/22 2204   BP: 148/67   Pulse: 72   Resp: 18   Temp: 98.8 °F (37.1 °C)   SpO2: 98%         Objective   Physical Exam  Vitals and nursing note reviewed.   Constitutional:       Appearance: Normal appearance.   HENT:      Head: Normocephalic and atraumatic.      Mouth/Throat:      Mouth: Mucous membranes are moist.      Pharynx: Oropharynx is clear.   Eyes:      Extraocular Movements: Extraocular movements intact.      Pupils: Pupils are equal, round, and reactive to light.   Cardiovascular:      Rate and Rhythm: Normal rate and regular rhythm.   Pulmonary:      Effort: Pulmonary effort is normal.      Breath sounds: Normal breath sounds.   Musculoskeletal:         General: Normal range of motion.      Cervical back: Normal range of motion and neck supple.      Comments: I cannot appreciate any significant swelling in the ankles.   Skin:     General: Skin is warm and dry.   Neurological:      General: No focal deficit present.      Mental Status: She is alert and oriented to person, place, and time.   Psychiatric:         Mood and Affect: Mood normal.         Behavior: Behavior normal.         Procedures         Lab Results (last 24 hours)     Procedure Component Value Units Date/Time    BNP [[id number]]  (Normal) Collected: 03/05/22 1851    Specimen: Blood Updated: 03/05/22 1922     proBNP 144.9 pg/mL     Narrative:      Among patients with dyspnea, NT-proBNP is highly sensitive for the detection of acute congestive heart failure. In addition NT-proBNP of <300 pg/ml effectively rules out acute congestive heart failure with 99% negative predictive value.    Results may be falsely decreased if patient taking Biotin.      CBC Auto Differential [[id number]]  (Abnormal) Collected:  03/05/22 1851    Specimen: Blood Updated: 03/05/22 1913     WBC 4.36 10*3/mm3      RBC 4.09 10*6/mm3      Hemoglobin 13.0 g/dL      Hematocrit 39.1 %      MCV 95.6 fL      MCH 31.8 pg      MCHC 33.2 g/dL      RDW 13.2 %      RDW-SD 45.1 fl      MPV 10.7 fL      Platelets 90 10*3/mm3      Neutrophil % 58.3 %      Lymphocyte % 25.2 %      Monocyte % 13.1 %      Eosinophil % 2.5 %      Basophil % 0.9 %      Neutrophils, Absolute 2.54 10*3/mm3      Lymphocytes, Absolute 1.10 10*3/mm3      Monocytes, Absolute 0.57 10*3/mm3      Eosinophils, Absolute 0.11 10*3/mm3      Basophils, Absolute 0.04 10*3/mm3     Comprehensive Metabolic Panel [[id number]] Collected: 03/05/22 1851    Specimen: Blood Updated: 03/05/22 1927     Glucose 90 mg/dL      BUN 14 mg/dL      Creatinine 0.65 mg/dL      Sodium 141 mmol/L      Potassium 4.0 mmol/L      Comment: Slight hemolysis detected by analyzer. Results may be affected.        Chloride 105 mmol/L      CO2 28.0 mmol/L      Calcium 9.8 mg/dL      Total Protein 8.0 g/dL      Albumin 4.50 g/dL      ALT (SGPT) 29 U/L      AST (SGOT) 31 U/L      Comment: Slight hemolysis detected by analyzer. Results may be affected.        Alkaline Phosphatase 95 U/L      Total Bilirubin 0.6 mg/dL      Globulin 3.5 gm/dL      A/G Ratio 1.3 g/dL      BUN/Creatinine Ratio 21.5     Anion Gap 8.0 mmol/L      eGFR 94.9 mL/min/1.73      Comment: National Kidney Foundation and American Society of Nephrology (ASN) Task Force recommended calculation based on the Chronic Kidney Disease Epidemiology Collaboration (CKD-EPI) equation refit without adjustment for race.       Narrative:      GFR Normal >60  Chronic Kidney Disease <60  Kidney Failure <15      D-dimer, Quantitative [[id number]]  (Abnormal) Collected: 03/05/22 1851    Specimen: Blood Updated: 03/05/22 1918     D-Dimer, Quantitative 1.07 mg/L (FEU)     Narrative:      Reference Range is 0-0.50 mg/L FEU. However, results <0.50 mg/L FEU tends to rule out DVT or PE.  Results >0.50 mg/L FEU are not useful in predicting absence or presence of DVT or PE.      Magnesium [[id number]]  (Normal) Collected: 03/05/22 1851    Specimen: Blood Updated: 03/05/22 1921     Magnesium 1.9 mg/dL     T4, Free [[id number]]  (Normal) Collected: 03/05/22 1851    Specimen: Blood Updated: 03/05/22 1932     Free T4 1.45 ng/dL     Narrative:      Results may be falsely increased if patient taking Biotin.      TSH [[id number]]  (Normal) Collected: 03/05/22 1851    Specimen: Blood Updated: 03/05/22 1933     TSH 1.040 uIU/mL     Ammonia [[id number]]  (Normal) Collected: 03/05/22 1914    Specimen: Blood Updated: 03/05/22 1944     Ammonia 44 umol/L           US Venous Doppler Lower Extremity Bilateral (duplex)    (Results Pending)       ED Course  ED Course as of 03/05/22 2225   Sat Mar 05, 2022   2224 I told the patient her testing here is all fine except for an elevated dimer but her ultrasound of her legs was normal.  I do not have a good explanation for all of her discomfort.  It could be the beginnings of restless leg syndrome but she says she also has some discomfort in her arms.  She now tells me she is also been documented to have a very low vitamin D but has not been able to afford the replacement therapy and they were told that they could use some over-the-counter vitamins but they have not tried that either.  I told her that may be part of the problem.  That thing to do would be to follow-up with her regular doctor as scheduled and let them recheck her.  She is discharged in stable condition. [TR]      ED Course User Index  [TR] Jenaro Puente Jr., MD          MDM  Number of Diagnoses or Management Options  Pain in both lower extremities: new and requires workup     Amount and/or Complexity of Data Reviewed  Clinical lab tests: ordered and reviewed  Tests in the radiology section of CPT®: ordered and reviewed    Risk of Complications, Morbidity, and/or Mortality  Presenting problems:  moderate  Diagnostic procedures: moderate  Management options: moderate    Patient Progress  Patient progress: stable      Final diagnoses:   Pain in both lower extremities          Jenaro Puente Jr., MD  03/05/22 8918

## 2022-03-07 LAB
QT INTERVAL: 426 MS
QTC INTERVAL: 450 MS

## 2022-04-15 ENCOUNTER — TELEPHONE (OUTPATIENT)
Dept: HEMATOLOGY | Age: 72
End: 2022-04-15

## 2022-04-15 NOTE — TELEPHONE ENCOUNTER
PATIENT PHONED IN AND WANTED TO ADVISE DR. MURO CAN NOT PUT BANDS ON HER VARICES  - SO THEY ARE REFERRING HER TO [place] INSTEAD.

## 2023-09-28 ENCOUNTER — TELEPHONE (OUTPATIENT)
Dept: VASCULAR SURGERY | Facility: CLINIC | Age: 73
End: 2023-09-28
Payer: MEDICARE

## 2023-09-28 NOTE — TELEPHONE ENCOUNTER
Caller: Reina Palomares    Relationship to patient: Self    Best call back number:     [phone]       Chief complaint: PATIENT STATES THAT SHE HAS HAD PAIN IN BOTH LEGS AND CAN'T WALK VERY FAR. SHE ALSO STATES THAT HER HANDS AND FEET SOMETIMES FEEL COLD.     Type of visit: NEW PATIENT     Requested date: NEXT AVAILABLE      If rescheduling, when is the original appointment: LAST SEEN 9/8/17     Additional notes: PATIENT WOULD LIKE A CALL BACK.

## 2023-09-28 NOTE — TELEPHONE ENCOUNTER
Patient called office and would like to schedule an appointment, patient has not been seen in office since 2017, explained she would need a new referral as it has been so long since she has been seen. Patient stated she talked to medicare and they informed her that she would not need a referral. Informed patient that I would speak with office staff and return her call.

## 2023-09-28 NOTE — TELEPHONE ENCOUNTER
Call placed to patient and informed her that she would in fact have to have a new referral. Instructed to have PCP send a referral to office and we would get her in. Patient verbalized understanding.

## 2023-11-10 ENCOUNTER — TELEPHONE (OUTPATIENT)
Dept: HEMATOLOGY | Age: 73
End: 2023-11-10

## 2023-11-10 NOTE — TELEPHONE ENCOUNTER
Patient called and wanted to make an appointment. Fiona is booked until January. She stated that her blood was very low and needed an appointment. I will give message to Georgette Key on Monday and the patient will have her labs faxed to this office. She has to talk to her son in order to make her appointment since he will be bringing her.

## 2023-11-14 ENCOUNTER — TELEPHONE (OUTPATIENT)
Dept: HEMATOLOGY | Age: 73
End: 2023-11-14

## 2023-11-14 NOTE — TELEPHONE ENCOUNTER
Called to notify of appointment with Fiona on November 17 at 9:00. Patient is aware of appointment date and time. Complex Repair And Skin Substitute Graft Text: The defect edges were debeveled with a #15 scalpel blade.  The primary defect was closed partially with a complex linear closure.  Given the location of the remaining defect, shape of the defect and the proximity to free margins a skin substitute graft was deemed most appropriate to repair the remaining defect.  The graft was trimmed to fit the size of the remaining defect.  The graft was then placed in the primary defect, oriented appropriately, and sutured into place.

## 2023-11-16 DIAGNOSIS — D69.6 THROMBOCYTOPENIA (HCC): Primary | ICD-10-CM

## 2023-11-16 NOTE — PROGRESS NOTES
Progress Note      Pt Name: Deb Nayak: 1950  MRN: [id number]    Date of evaluation: 11/17/2023  History Obtained From:  patient, electronic medical record    CHIEF COMPLAINT:    Chief Complaint   Patient presents with    Follow-up     01/19/2022 Office Visit Screening breast examination  08/19/2021 Office Visit Thrombocytopenia ([address])         HISTORY OF PRESENT ILLNESS:    Licha Westbrook is a 68 y.o.  female who is currently being followed for stage Ib undifferentiated non-small cell lung carcinoma, initially diagnosed in 2008 with no known recurrent disease, vaginal intraepithelial neoplasm grade 1, 2016 and thrombocytopenia secondary to cirrhosis of the liver. Lyndsay Elliott returns today in scheduled follow-up for evaluation, lab monitoring, review CT scan results and further treatment recommendations. She presents today with complaints of cough and congestion which she suspects are secondary to a head cold. She denies any febrile illness. She reports easily bruising otherwise no noted bleeding. Lyndsay Elliott reports having some difficulty swallowing food due to banding for esophageal varices, she can swallow soft foods or liquids in small amounts without difficulty. 01/18/2022 CT chest low dose [facility])- showed mild emphysema; no suspicious mass or nodule. Lung RADS category 1. 1 year follow-up recommended    Her chronic thrombocytopenia is stable with a platelet count of 46,587 today. Today's clinic visit to include physical assessment, review of systems, any radiograph or lab findings that were available and plan of care are documented below.      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 The electronic translation of spoken language may permit erroneous, or at times, nonsensical words or phrases to be inadvertently transcribed; although attempts have made to review the note for such errors, some may still exist.  Please excuse any unrecognized transcription errors and contact us if the error is unintelligible or needs documented correction. Also, portions of this note have been copied forward, however, changed to reflect the most current clinical status of this patient. Electronically signed by COLTON Dow on 11/17/2023 at 9:32 AM  Edgar SAUL am starting this note as a registered nurse for COLTON Escalera.

## 2023-11-17 ENCOUNTER — OFFICE VISIT (OUTPATIENT)
Dept: HEMATOLOGY | Age: 73
End: 2023-11-17
Payer: MEDICARE

## 2023-11-17 ENCOUNTER — HOSPITAL ENCOUNTER (OUTPATIENT)
Dept: INFUSION THERAPY | Age: 73
Discharge: HOME | End: 2023-11-17
Payer: MEDICARE

## 2023-11-17 ENCOUNTER — HOSPITAL ENCOUNTER (OUTPATIENT)
Dept: ULTRASOUND IMAGING | Age: 73
Discharge: HOME | End: 2023-11-17
Payer: MEDICARE

## 2023-11-17 VITALS
BODY MASS INDEX: 33.87 KG/M2 | OXYGEN SATURATION: 97 % | HEART RATE: 66 BPM | DIASTOLIC BLOOD PRESSURE: 88 MMHG | HEIGHT: 64 IN | WEIGHT: 198.4 LBS | SYSTOLIC BLOOD PRESSURE: 138 MMHG | TEMPERATURE: 97.8 F

## 2023-11-17 DIAGNOSIS — D69.6 THROMBOCYTOPENIA (HCC): ICD-10-CM

## 2023-11-17 DIAGNOSIS — I81 PORTAL VEIN THROMBOSIS: ICD-10-CM

## 2023-11-17 DIAGNOSIS — C34.12 MALIGNANT NEOPLASM OF UPPER LOBE OF LEFT LUNG (HCC): Primary | ICD-10-CM

## 2023-11-17 DIAGNOSIS — C34.12 MALIGNANT NEOPLASM OF UPPER LOBE OF LEFT LUNG (HCC): ICD-10-CM

## 2023-11-17 DIAGNOSIS — K74.60 CIRRHOSIS OF LIVER WITHOUT ASCITES, UNSPECIFIED HEPATIC CIRRHOSIS TYPE (HCC): ICD-10-CM

## 2023-11-17 DIAGNOSIS — R16.1 SPLENOMEGALY: ICD-10-CM

## 2023-11-17 DIAGNOSIS — K74.60 ESOPHAGEAL VARICES IN CIRRHOSIS (HCC): ICD-10-CM

## 2023-11-17 DIAGNOSIS — I85.10 ESOPHAGEAL VARICES IN CIRRHOSIS (HCC): ICD-10-CM

## 2023-11-17 LAB
ALBUMIN SERPL-MCNC: 4.6 G/DL (ref 3.5–5.2)
ALP SERPL-CCNC: 99 U/L (ref 35–104)
ALT SERPL-CCNC: 63 U/L (ref 9–52)
ANION GAP SERPL CALCULATED.3IONS-SCNC: 12 MMOL/L (ref 7–19)
AST SERPL-CCNC: 84 U/L (ref 14–36)
BASOPHILS # BLD: 0.05 K/UL (ref 0.01–0.08)
BASOPHILS NFR BLD: 0.9 % (ref 0.1–1.2)
BILIRUB SERPL-MCNC: 1.1 MG/DL (ref 0.2–1.3)
BUN SERPL-MCNC: 7 MG/DL (ref 7–17)
CALCIUM SERPL-MCNC: 9.9 MG/DL (ref 8.4–10.2)
CHLORIDE SERPL-SCNC: 105 MMOL/L (ref 98–111)
CO2 SERPL-SCNC: 26 MMOL/L (ref 22–29)
CREAT SERPL-MCNC: 0.6 MG/DL (ref 0.5–1)
EOSINOPHIL # BLD: 0.1 K/UL (ref 0.04–0.54)
EOSINOPHIL NFR BLD: 1.7 % (ref 0.7–7)
ERYTHROCYTE [DISTWIDTH] IN BLOOD BY AUTOMATED COUNT: 13 % (ref 11.7–14.4)
GLOBULIN: 4.3 G/DL
GLUCOSE SERPL-MCNC: 143 MG/DL (ref 74–106)
HCT VFR BLD AUTO: 37.7 % (ref 34.1–44.9)
HGB BLD-MCNC: 12.9 G/DL (ref 11.2–15.7)
LYMPHOCYTES # BLD: 1.25 K/UL (ref 1.18–3.74)
LYMPHOCYTES NFR BLD: 21.3 % (ref 19.3–53.1)
MCH RBC QN AUTO: 32.3 PG (ref 25.6–32.2)
MCHC RBC AUTO-ENTMCNC: 34.2 G/DL (ref 32.3–35.5)
MCV RBC AUTO: 94.3 FL (ref 79.4–94.8)
MONOCYTES # BLD: 0.54 K/UL (ref 0.24–0.82)
MONOCYTES NFR BLD: 9.2 % (ref 4.7–12.5)
NEUTROPHILS # BLD: 3.93 K/UL (ref 1.56–6.13)
NEUTS SEG NFR BLD: 66.7 % (ref 34–71.1)
PLATELET # BLD AUTO: 91 K/UL (ref 182–369)
PMV BLD AUTO: 10.3 FL (ref 7.4–10.4)
POTASSIUM SERPL-SCNC: 4.8 MMOL/L (ref 3.5–5.1)
PROT SERPL-MCNC: 8.9 G/DL (ref 6.3–8.2)
RBC # BLD AUTO: 4 M/UL (ref 3.93–5.22)
SODIUM SERPL-SCNC: 143 MMOL/L (ref 137–145)
WBC # BLD AUTO: 5.88 K/UL (ref 3.98–10.04)

## 2023-11-17 PROCEDURE — 80053 COMPREHEN METABOLIC PANEL: CPT

## 2023-11-17 PROCEDURE — 99212 OFFICE O/P EST SF 10 MIN: CPT

## 2023-11-17 PROCEDURE — 36415 COLL VENOUS BLD VENIPUNCTURE: CPT

## 2023-11-17 PROCEDURE — 85025 COMPLETE CBC W/AUTO DIFF WBC: CPT

## 2023-11-17 PROCEDURE — 76700 US EXAM ABDOM COMPLETE: CPT

## 2023-11-17 RX ORDER — OXYBUTYNIN CHLORIDE 10 MG/1
10 TABLET, EXTENDED RELEASE ORAL DAILY
COMMUNITY

## 2023-11-17 RX ORDER — PROPRANOLOL HYDROCHLORIDE 20 MG/1
20 TABLET ORAL DAILY
COMMUNITY

## 2023-11-17 RX ORDER — FAMOTIDINE 40 MG/1
40 TABLET, FILM COATED ORAL DAILY
COMMUNITY

## 2023-11-17 RX ORDER — SPIRONOLACTONE 25 MG/1
25 TABLET ORAL DAILY
COMMUNITY

## 2023-11-17 RX ORDER — LEVOTHYROXINE SODIUM 112 UG/1
112 TABLET ORAL DAILY
COMMUNITY

## 2023-11-17 ASSESSMENT — ENCOUNTER SYMPTOMS
NAUSEA: 1
SHORTNESS OF BREATH: 1
ABDOMINAL PAIN: 1
COUGH: 1

## 2023-11-20 ENCOUNTER — TELEPHONE (OUTPATIENT)
Dept: INFUSION THERAPY | Age: 73
End: 2023-11-20

## 2023-11-20 NOTE — TELEPHONE ENCOUNTER
Reeived  stating that NORRIS Osuna CRNA put her on Eliquis on 11/17/2023 and that she just urinated and had bright red bleeding in the toilet. D/w JEROMY Campbell RN. Pt to hold Eliquis today and tomorrow and she will contact patient on 11/22/2023 am.   Upon giving these instructions, patient states that she does have pain with urination and is having some right flank pain. States she can't come to [facility] for urinalysis. Will attempt to get a ride today or tomorrrow to [facility] for urinalysis.

## 2023-11-21 ENCOUNTER — TELEPHONE (OUTPATIENT)
Dept: HEMATOLOGY | Age: 73
End: 2023-11-21

## 2023-11-21 NOTE — TELEPHONE ENCOUNTER
Patient called stating that she went to ER and was told she had a UTI and was put on antibiotics. States that she is not having any blood in her urine at this time. Wanting to know when she  needs to restart the Eliquis. Instructed that CONOR Mckeon would like for  her to restart it in the morning and to only take one tablet (5 mg) in the morning and one tablet in the evening. Will not complete loading dose. Instructed to call with any problems. Patient v/u and is agreeable to plan.

## 2023-11-23 LAB
APCR PPP: 5.68 {RATIO}
APTT IMM NP PPP: NORMAL SEC (ref 32–48)
APTT P HEP NEUT PPP: NORMAL SEC (ref 32–48)
AT III ACT/NOR PPP CHRO: 76 % (ref 76–128)
B2 GLYCOPROT1 IGG SERPL IA-ACNC: <10 SGU
B2 GLYCOPROT1 IGM SERPL IA-ACNC: <10 SMU
CARDIOLIPIN IGG SER IA-ACNC: <10 GPL
CARDIOLIPIN IGM SER IA-ACNC: <10 MPL
CONFIRM APTT STACLOT: NORMAL
DRVVT SCREEN TO CONFIRM RATIO: NORMAL RATIO
F2 C.20210G>A GENO BLD/T: NEGATIVE
F5 P.R506Q BLD/T QL: NORMAL
HCYS SERPL-SCNC: 12 UMOL/L (ref 0–15)
LA 3 SCREEN W REFLEX-IMP: NORMAL
LA NT DPL PPP QL: NORMAL
MIXING DRVVT: NORMAL SEC (ref 33–44)
PROT C ACT/NOR PPP: 89 % (ref 83–168)
PROT S FREE AG ACT/NOR PPP IA: 95 % (ref 55–123)
PROTHROMBIN TIME: 15.3 SEC (ref 12–15.5)
REPTILASE TIME: NORMAL SEC
SCREEN APTT: 45 SEC (ref 32–48)
SCREEN DRVVT: 37 SEC (ref 33–44)
SPECIMEN SOURCE: NORMAL
SPECIMEN SOURCE: NORMAL
THROMBIN TIME: NORMAL SEC (ref 14.7–19.5)
THROMBOSIS INTERPRETATION: NORMAL

## 2023-11-27 ENCOUNTER — TELEPHONE (OUTPATIENT)
Dept: INFUSION THERAPY | Age: 73
End: 2023-11-27

## 2023-11-27 ENCOUNTER — HOSPITAL ENCOUNTER (OUTPATIENT)
Dept: GENERAL RADIOLOGY | Age: 73
Discharge: HOME | End: 2023-11-27
Payer: MEDICARE

## 2023-11-27 DIAGNOSIS — C34.12 MALIGNANT NEOPLASM OF UPPER LOBE OF LEFT LUNG (HCC): ICD-10-CM

## 2023-11-27 PROCEDURE — 71250 CT THORAX DX C-: CPT

## 2023-11-27 NOTE — TELEPHONE ENCOUNTER
Patient contacted clinic saying that she is bloated and throwing up foam. Attempted to return phone call to patient with no answer.

## 2023-11-29 ENCOUNTER — TELEPHONE (OUTPATIENT)
Dept: HEMATOLOGY | Age: 73
End: 2023-11-29

## 2023-11-29 RX ORDER — ONDANSETRON 4 MG/1
4 TABLET, ORALLY DISINTEGRATING ORAL 3 TIMES DAILY PRN
Qty: 21 TABLET | Refills: 0 | Status: SHIPPED | OUTPATIENT
Start: 2023-11-29

## 2023-11-29 NOTE — TELEPHONE ENCOUNTER
Patient called the office stating that she is having increased nausea and is wanting to know if the blood clots can cause this. Talked to patient at length regarding the blood clots and that it does not affect her stomach or how her stomach digest food. She states that this has been going on for some time. She has a call into her gastroenterologist and is waiting for a call back from them. Instructed that COLTON Tang will call her in some Zofran for the nausea. Instructed on medication, dosage, and frequency. Instructed that she needs to see the gastroenterologist or her PCP regarding the nausea. Patient v/u.

## 2023-12-04 ENCOUNTER — TELEPHONE (OUTPATIENT)
Dept: HEMATOLOGY | Age: 73
End: 2023-12-04

## 2023-12-04 NOTE — TELEPHONE ENCOUNTER
Alveria Munson called and wants to know when she needs to start getting her labs at [facility]. I have sent Theo Sandoval a message.

## 2023-12-07 ENCOUNTER — TELEPHONE (OUTPATIENT)
Dept: INFUSION THERAPY | Age: 73
End: 2023-12-07

## 2023-12-07 NOTE — TELEPHONE ENCOUNTER
Patient contacted clinic and left voicemail stating that she is having blood in her urine again and wants to know if she should continue her eliquis. Spoke with [facility] Course, COLTON- instructed patient to hold eliquis until Monday and then to call back to clinic to report symptoms and for further instructions. She verbalized understanding.

## 2023-12-09 ASSESSMENT — ENCOUNTER SYMPTOMS
VOMITING: 0
EYE PAIN: 0
BACK PAIN: 0
CONSTIPATION: 0
EYE REDNESS: 0
SORE THROAT: 0
DIARRHEA: 0
WHEEZING: 0
EYE DISCHARGE: 0
BLOOD IN STOOL: 0
EYES NEGATIVE: 1

## 2023-12-24 ENCOUNTER — APPOINTMENT (OUTPATIENT)
Dept: CT IMAGING | Facility: HOSPITAL | Age: 73
End: 2023-12-24
Payer: MEDICARE

## 2023-12-24 ENCOUNTER — HOSPITAL ENCOUNTER (EMERGENCY)
Facility: HOSPITAL | Age: 73
Discharge: HOME | End: 2023-12-25
Admitting: EMERGENCY MEDICINE
Payer: MEDICARE

## 2023-12-24 DIAGNOSIS — R39.198 DIFFICULTY URINATING: Primary | ICD-10-CM

## 2023-12-24 DIAGNOSIS — R10.10 PAIN OF UPPER ABDOMEN: ICD-10-CM

## 2023-12-24 DIAGNOSIS — N20.0 KIDNEY STONE: ICD-10-CM

## 2023-12-24 LAB
ALBUMIN SERPL-MCNC: 4.3 G/DL (ref 3.5–5.2)
ALBUMIN/GLOB SERPL: 1.2 G/DL
ALP SERPL-CCNC: 96 U/L (ref 39–117)
ALT SERPL W P-5'-P-CCNC: 53 U/L (ref 1–33)
ANION GAP SERPL CALCULATED.3IONS-SCNC: 9 MMOL/L (ref 5–15)
APTT PPP: 35.3 SECONDS (ref 24.5–36)
AST SERPL-CCNC: 61 U/L (ref 1–32)
BACTERIA UR QL AUTO: ABNORMAL /HPF
BASOPHILS # BLD AUTO: 0.04 10*3/MM3 (ref 0–0.2)
BASOPHILS NFR BLD AUTO: 0.7 % (ref 0–1.5)
BILIRUB SERPL-MCNC: 0.5 MG/DL (ref 0–1.2)
BILIRUB UR QL STRIP: NEGATIVE
BUN SERPL-MCNC: 11 MG/DL (ref 8–23)
BUN/CREAT SERPL: 19 (ref 7–25)
CALCIUM SPEC-SCNC: 9.1 MG/DL (ref 8.6–10.5)
CHLORIDE SERPL-SCNC: 105 MMOL/L (ref 98–107)
CLARITY UR: CLEAR
CO2 SERPL-SCNC: 28 MMOL/L (ref 22–29)
COLOR UR: YELLOW
CREAT SERPL-MCNC: 0.58 MG/DL (ref 0.57–1)
DEPRECATED RDW RBC AUTO: 47.8 FL (ref 37–54)
EGFRCR SERPLBLD CKD-EPI 2021: 96.3 ML/MIN/1.73
EOSINOPHIL # BLD AUTO: 0.18 10*3/MM3 (ref 0–0.4)
EOSINOPHIL NFR BLD AUTO: 3 % (ref 0.3–6.2)
ERYTHROCYTE [DISTWIDTH] IN BLOOD BY AUTOMATED COUNT: 13.6 % (ref 12.3–15.4)
GLOBULIN UR ELPH-MCNC: 3.6 GM/DL
GLUCOSE SERPL-MCNC: 148 MG/DL (ref 65–99)
GLUCOSE UR STRIP-MCNC: NEGATIVE MG/DL
HCT VFR BLD AUTO: 37.1 % (ref 34–46.6)
HGB BLD-MCNC: 12.2 G/DL (ref 12–15.9)
HGB UR QL STRIP.AUTO: ABNORMAL
HYALINE CASTS UR QL AUTO: ABNORMAL /LPF
INR PPP: 1.27 (ref 0.91–1.09)
KETONES UR QL STRIP: NEGATIVE
LEUKOCYTE ESTERASE UR QL STRIP.AUTO: NEGATIVE
LIPASE SERPL-CCNC: 28 U/L (ref 13–60)
LYMPHOCYTES # BLD AUTO: 1.55 10*3/MM3 (ref 0.7–3.1)
LYMPHOCYTES NFR BLD AUTO: 26.1 % (ref 19.6–45.3)
MAGNESIUM SERPL-MCNC: 2 MG/DL (ref 1.6–2.4)
MCH RBC QN AUTO: 31.7 PG (ref 26.6–33)
MCHC RBC AUTO-ENTMCNC: 32.9 G/DL (ref 31.5–35.7)
MCV RBC AUTO: 96.4 FL (ref 79–97)
MONOCYTES # BLD AUTO: 0.65 10*3/MM3 (ref 0.1–0.9)
MONOCYTES NFR BLD AUTO: 11 % (ref 5–12)
NEUTROPHILS NFR BLD AUTO: 3.5 10*3/MM3 (ref 1.7–7)
NEUTROPHILS NFR BLD AUTO: 59 % (ref 42.7–76)
NITRITE UR QL STRIP: NEGATIVE
PH UR STRIP.AUTO: 7 [PH] (ref 5–8)
PLATELET # BLD AUTO: 101 10*3/MM3 (ref 140–450)
PMV BLD AUTO: 10.5 FL (ref 6–12)
POTASSIUM SERPL-SCNC: 3.7 MMOL/L (ref 3.5–5.2)
PROT SERPL-MCNC: 7.9 G/DL (ref 6–8.5)
PROT UR QL STRIP: NEGATIVE
PROTHROMBIN TIME: 16.1 SECONDS (ref 11.8–14.8)
RBC # BLD AUTO: 3.85 10*6/MM3 (ref 3.77–5.28)
RBC # UR STRIP: ABNORMAL /HPF
REF LAB TEST METHOD: ABNORMAL
SODIUM SERPL-SCNC: 142 MMOL/L (ref 136–145)
SP GR UR STRIP: 1.01 (ref 1–1.03)
SQUAMOUS #/AREA URNS HPF: ABNORMAL /HPF
UROBILINOGEN UR QL STRIP: ABNORMAL
WBC # UR STRIP: ABNORMAL /HPF
WBC NRBC COR # BLD AUTO: 5.93 10*3/MM3 (ref 3.4–10.8)

## 2023-12-24 PROCEDURE — 85025 COMPLETE CBC W/AUTO DIFF WBC: CPT

## 2023-12-24 PROCEDURE — 81001 URINALYSIS AUTO W/SCOPE: CPT

## 2023-12-24 PROCEDURE — 51798 US URINE CAPACITY MEASURE: CPT

## 2023-12-24 PROCEDURE — 85730 THROMBOPLASTIN TIME PARTIAL: CPT

## 2023-12-24 PROCEDURE — 83735 ASSAY OF MAGNESIUM: CPT

## 2023-12-24 PROCEDURE — 80053 COMPREHEN METABOLIC PANEL: CPT

## 2023-12-24 PROCEDURE — 74177 CT ABD & PELVIS W/CONTRAST: CPT

## 2023-12-24 PROCEDURE — 83690 ASSAY OF LIPASE: CPT

## 2023-12-24 PROCEDURE — 99285 EMERGENCY DEPT VISIT HI MDM: CPT

## 2023-12-24 PROCEDURE — 85610 PROTHROMBIN TIME: CPT

## 2023-12-24 RX ORDER — SODIUM CHLORIDE 0.9 % (FLUSH) 0.9 %
10 SYRINGE (ML) INJECTION AS NEEDED
Status: DISCONTINUED | OUTPATIENT
Start: 2023-12-24 | End: 2023-12-25 | Stop reason: HOSPADM

## 2023-12-25 ENCOUNTER — NURSE TRIAGE (OUTPATIENT)
Dept: CALL CENTER | Facility: HOSPITAL | Age: 73
End: 2023-12-25
Payer: MEDICARE

## 2023-12-25 VITALS
OXYGEN SATURATION: 95 % | BODY MASS INDEX: 33.63 KG/M2 | RESPIRATION RATE: 20 BRPM | WEIGHT: 197 LBS | DIASTOLIC BLOOD PRESSURE: 61 MMHG | SYSTOLIC BLOOD PRESSURE: 126 MMHG | HEIGHT: 64 IN | HEART RATE: 60 BPM | TEMPERATURE: 98.8 F

## 2023-12-25 PROCEDURE — 25510000001 IOPAMIDOL 61 % SOLUTION

## 2023-12-25 RX ORDER — METOCLOPRAMIDE 5 MG/1
5 TABLET ORAL 3 TIMES DAILY PRN
Qty: 15 TABLET | Refills: 0 | Status: SHIPPED | OUTPATIENT
Start: 2023-12-25

## 2023-12-25 RX ORDER — HYDROCODONE BITARTRATE AND ACETAMINOPHEN 5; 325 MG/1; MG/1
1 TABLET ORAL EVERY 6 HOURS PRN
Qty: 10 TABLET | Refills: 0 | Status: SHIPPED | OUTPATIENT
Start: 2023-12-25

## 2023-12-25 RX ORDER — DOCUSATE SODIUM 100 MG/1
100 CAPSULE, LIQUID FILLED ORAL 2 TIMES DAILY
Qty: 20 CAPSULE | Refills: 0 | Status: SHIPPED | OUTPATIENT
Start: 2023-12-25

## 2023-12-25 RX ADMIN — IOPAMIDOL 100 ML: 612 INJECTION, SOLUTION INTRAVENOUS at 00:06

## 2023-12-25 NOTE — ED PROVIDER NOTES
Date of Service: 08/06/2019    SUBJECTIVE:  This patient is a kind referral from ROBERTO Aguillon.  The patient last saw Olga Lidia on 06/27/2019.  She is here today for diabetic foot care and exam.  She also wanted my opinion on some treatments.    The patient states she does get a callused area near the first metatarsal, right,  as I believe many years ago she had some work done on her sesamoid bone there and that is some scar tissue.  She states it really does not give her pain at this time since she has been buying wider shoes.    Also, she has been seeing an orthopedic physician at [facility].  I [place] [address].  I believe she just had x-rays taken last week and she also had an MRI done.  She has had some peroneal tendon pain and the physician she has been seeing there has recommended a gauntlet AFO, which the patient really does not want to try.  Otherwise, he did tell her he could do an ankle fusion.  Another question is, she does have a painful bunion on the left side.  She was wondering whether that should be repaired.  She states that the orthopedic surgeon at [facility] did say he could work on that, but he did tell her that just correcting the left bunion would probably not help the left ankle.    ALLERGIES AND MEDICATIONS:  Reviewed in Epic.      Her last hemoglobin A1c was 7.1%.  She does have a history of peripheral venous insufficiency, but no arterial peripheral vascular disease.    OBJECTIVE:  VASCULAR:  Palpable DP and PT pulses bilaterally.  NEUROLOGIC:  Using a 5.07 monofilament wire, her distal, protective sensation is intact to her toes and feet bilaterally.  DERMATOLOGICAL:  Indeed there is some callus tissue/scar tissue on the plantar medial first metatarsal head, right.  There are no signs of any breakdown there.    MUSCULOSKELETAL:  There is pes planus upon stance bilaterally.  There is hallux valgus, left greater than right.  There is some hammering  Subjective   History of Present Illness  Patient is a 73-year-old female that presents to the emergency department for multiple complaints.  Patient states that she is currently being treated by Dr. Almonte's office for a portal vein thrombosis.  She states that she is taking Eliquis. She states that originally she was instructed to take 5 mg twice daily but due to acute hematuria she has been encouraged to only take one 5 mg tablet daily.  Patient denies any missed doses of this.  Patient describes hematuria as very light pink in color and only when wiping. Patient states she has been having intermittent episodes of urinary retention over the last 2 days.  She states that she has to strain in order to urinate and still can only urinate small amounts.  She is also complaining of constipation and reports that she has not had a bowel movement in 5 days.  She states that she is also experiencing generalized abdominal pain as well as right flank pain that started today. Patient reports she has been having some nausea and vomiting but no diarrhea.  Patient denies any chest pain, shortness of breath, fevers, body aches, or chills.  Denies any lightheadedness, dizziness, or blurred vision.      Review of Systems   Constitutional:  Positive for activity change.   Gastrointestinal:  Positive for abdominal pain, constipation, nausea and vomiting.   Genitourinary:  Positive for decreased urine volume, difficulty urinating, dysuria, flank pain, hematuria and urgency.   All other systems reviewed and are negative.      Past Medical History:   Diagnosis Date    Cancer     LUNG    GERD (gastroesophageal reflux disease)     Hyperthyroidism     Infectious viral hepatitis     H/O HEPATITIS C    Swelling of lower extremity     Varices, esophageal        Allergies   Allergen Reactions    Dilaudid [Hydromorphone Hcl] Hallucinations       Past Surgical History:   Procedure Laterality Date    CARDIAC CATHETERIZATION      CLAVICLE SURGERY  Right     COLONOSCOPY      DILATION AND CURETTAGE, DIAGNOSTIC / THERAPEUTIC      HYSTERECTOMY      TOTAL ABDOMINAL HYSTERECTOMY    KNEE ARTHROSCOPY Bilateral     LUNG REMOVAL, TOTAL Left     MYRINGOTOMY W/ TUBES      THYROID BIOPSY      BENIGN    TUBAL ABDOMINAL LIGATION      X2       Family History   Problem Relation Age of Onset    Cancer Other     Coronary artery disease Other     Thyroid disease Other     Melanoma Other     Melanoma Sister         LEFT ARM    Hypothyroidism Sister     Cancer Brother     Coronary artery disease Brother     Hyperthyroidism Sister     Other Mother     Other Father     Breast cancer Neg Hx        Social History     Socioeconomic History    Marital status:    Tobacco Use    Smoking status: Former     Packs/day: 1.5     Types: Cigarettes     Quit date: 2008     Years since quitting: 15.9    Smokeless tobacco: Never    Tobacco comments:     LESS THAN 1 PPD FOR 12 YEARS   Substance and Sexual Activity    Alcohol use: Yes     Comment: OCCASIONAL WINE    Drug use: No    Sexual activity: Defer     Partners: Male     Birth control/protection: None           Objective   Physical Exam  Vitals and nursing note reviewed.   Constitutional:       Appearance: Normal appearance.      Comments: Nontoxic appearing. In no acute distress.    HENT:      Head: Normocephalic and atraumatic.      Right Ear: External ear normal.      Left Ear: External ear normal.      Nose: Nose normal.      Mouth/Throat:      Mouth: Mucous membranes are moist.      Pharynx: Oropharynx is clear.   Eyes:      Extraocular Movements: Extraocular movements intact.      Conjunctiva/sclera: Conjunctivae normal.      Pupils: Pupils are equal, round, and reactive to light.   Cardiovascular:      Rate and Rhythm: Normal rate and regular rhythm.      Pulses: Normal pulses.      Heart sounds: Normal heart sounds.   Pulmonary:      Effort: Pulmonary effort is normal. No respiratory distress.      Breath sounds: Normal breath  of the second toes, left greater than right,   subtalar joint range of motion of left is good and pain free.  Slightly restricted ankle joint range of motion of left.    ASSESSMENT:  1.  Type 2 diabetes.  2.  Scar/callus, right foot.  3.  Hallux valgus, left greater than right.  4.  Pain, especially left foot.  5.  History of ankle pain, left.    PLAN:  I did inspect that scar tissue with a sterile #10 blade on the right foot.  There were no cuts with my inspection.  I certainly told her I do not see any signs of any breakdown at this point, but as she knows being a diabetic, this is also something she should watch closely.    As far as the ankle, she did show me the report and I believe they did not see any pathology with the peroneal tendon, she had some flexor hallucis or flexor digitorum longus pathology, but as she knows, that is on the other side of the ankle.  I did tell her I think certainly most people would recommend trying the brace.  We talked about orthotics and inserts and she has some from [facility],  which really looked pretty good.  She states she was really not interested in getting diabetic shoes, although I believe she has tried the Dr[employer] Molina brand in the past, but it did not fit her too well.    For the question of the left bunion, her bunion looks very significant and I was not able to assess her x-rays as they are not in our system here at [facility].  I told her it looks like she has a very high intermetatarsal angle and I told her I do correct bunions, but I believe hers would probably need surgery done like a Lapidus fashion more proximally which is a procedure I do not do.  I did tell her I do agree with her orthopedic physician that just correcting that left bunion would probably not help the left ankle, although I did tell her there is a smaller chance it would.  For now, she is just going to get back to me on an as needed basis.      Dictated By: Casimiro Fernandez DPM  Signing Provider: Casimiro  LEIGHA Fernandez/heaven ([id number])  DD: 08/06/2019 11:46:04 TD: 08/07/2019 05:13:56    Copy Sent To:     cc: Olga Lidia MEJIA   sounds. No wheezing.   Chest:      Chest wall: No tenderness.   Abdominal:      General: Abdomen is protuberant. Bowel sounds are normal. There is no distension.      Palpations: Abdomen is soft.      Tenderness: There is generalized abdominal tenderness. There is right CVA tenderness. There is no left CVA tenderness, guarding or rebound.   Musculoskeletal:         General: Normal range of motion.      Cervical back: Normal range of motion and neck supple.      Right lower leg: No edema.      Left lower leg: No edema.   Skin:     General: Skin is warm and dry.      Capillary Refill: Capillary refill takes less than 2 seconds.   Neurological:      General: No focal deficit present.      Mental Status: She is alert and oriented to person, place, and time. Mental status is at baseline.   Psychiatric:         Mood and Affect: Mood normal.         Behavior: Behavior normal.         Thought Content: Thought content normal.         Judgment: Judgment normal.         Procedures           ED Course                                             Medical Decision Making  Reina Palomares is a 73 y.o. female who presents to the ED for multiple complaints.  Patient states that she is currently being treated by Dr. Almonte's office for a portal vein thrombosis.  She states that she is taking Eliquis. She states that originally she was instructed to take 5 mg twice daily but due to acute hematuria she has been encouraged to only take one 5 mg tablet daily.  Patient denies any missed doses of this.  Patient describes hematuria as very light pink in color and only when wiping. Patient states she has been having intermittent episodes of urinary retention over the last 2 days.  She states that she has to strain in order to urinate and still can only urinate small amounts.  She is also complaining of constipation and reports that she has not had a bowel movement in 5 days.  She states that she is also experiencing generalized abdominal pain  as well as right flank pain that started today. Patient reports she has been having some nausea and vomiting but no diarrhea.  Patient denies any chest pain, shortness of breath, fevers, body aches, or chills.  Denies any lightheadedness, dizziness, or blurred vision.    Patient was non-toxic appearing on arrival. No acute distress was noted.  Vital signs stable.     Past medical history, surgical history, and medication regimen reviewed.     Previous notes, labs, imaging and more reviewed.     Patient's presentation raises suspicion for differentials including, but not limited to, urinary retention, urinary tract infection, nephrolithiasis, obstructive uropathy, constipation, obstruction.     Please refer to above section of note for lab and imaging results that were reviewed and interpreted by radiology as well as attending physician.     Patient was signed out to attending physician, Dr. Reddy, in stable condition pending any workup, reassessment, and final disposition.     Dragon disclaimer:  Parts of this note may be an electronic transcription/translation of spoken language to printed text using the Dragon dictation system.       Amount and/or Complexity of Data Reviewed  Labs: ordered.  Radiology: ordered.    Risk  Prescription drug management.        Final diagnoses:   Difficulty urinating   Pain of upper abdomen       ED Disposition  ED Disposition       None            No follow-up provider specified.       Medication List      No changes were made to your prescriptions during this visit.            Dyan Jennings, APRN  12/25/23 0201

## 2023-12-25 NOTE — ED PROVIDER NOTES
Subjective   History of Present Illness  Pt t/o to me pending CT scan. Please see primary note for HPI/ROS/PE        Review of Systems    Past Medical History:   Diagnosis Date    Cancer     LUNG    GERD (gastroesophageal reflux disease)     Hyperthyroidism     Infectious viral hepatitis     H/O HEPATITIS C    Swelling of lower extremity     Varices, esophageal        Allergies   Allergen Reactions    Dilaudid [Hydromorphone Hcl] Hallucinations       Past Surgical History:   Procedure Laterality Date    CARDIAC CATHETERIZATION      CLAVICLE SURGERY Right     COLONOSCOPY      DILATION AND CURETTAGE, DIAGNOSTIC / THERAPEUTIC      HYSTERECTOMY      TOTAL ABDOMINAL HYSTERECTOMY    KNEE ARTHROSCOPY Bilateral     LUNG REMOVAL, TOTAL Left     MYRINGOTOMY W/ TUBES      THYROID BIOPSY      BENIGN    TUBAL ABDOMINAL LIGATION      X2       Family History   Problem Relation Age of Onset    Cancer Other     Coronary artery disease Other     Thyroid disease Other     Melanoma Other     Melanoma Sister         LEFT ARM    Hypothyroidism Sister     Cancer Brother     Coronary artery disease Brother     Hyperthyroidism Sister     Other Mother     Other Father     Breast cancer Neg Hx        Social History     Socioeconomic History    Marital status:    Tobacco Use    Smoking status: Former     Packs/day: 1.5     Types: Cigarettes     Quit date: 2008     Years since quitting: 15.9    Smokeless tobacco: Never    Tobacco comments:     LESS THAN 1 PPD FOR 12 YEARS   Substance and Sexual Activity    Alcohol use: Yes     Comment: OCCASIONAL WINE    Drug use: No    Sexual activity: Defer     Partners: Male     Birth control/protection: None           Objective   Physical Exam    Procedures           ED Course      Labs Reviewed   URINALYSIS W/ CULTURE IF INDICATED - Abnormal; Notable for the following components:       Result Value    Blood, UA Large (3+) (*)     All other components within normal limits    Narrative:     In absence  of clinical symptoms, the presence of pyuria, bacteria, and/or nitrites on the urinalysis result does not correlate with infection.   URINALYSIS, MICROSCOPIC ONLY - Abnormal; Notable for the following components:    RBC, UA 21-50 (*)     All other components within normal limits   COMPREHENSIVE METABOLIC PANEL - Abnormal; Notable for the following components:    Glucose 148 (*)     ALT (SGPT) 53 (*)     AST (SGOT) 61 (*)     All other components within normal limits    Narrative:     GFR Normal >60  Chronic Kidney Disease <60  Kidney Failure <15    The GFR formula is only valid for adults with stable renal function between ages 18 and 70.   PROTIME-INR - Abnormal; Notable for the following components:    Protime 16.1 (*)     INR 1.27 (*)     All other components within normal limits   CBC WITH AUTO DIFFERENTIAL - Abnormal; Notable for the following components:    Platelets 101 (*)     All other components within normal limits   APTT - Normal   LIPASE - Normal   MAGNESIUM - Normal   CBC AND DIFFERENTIAL    Narrative:     The following orders were created for panel order CBC & Differential.  Procedure                               Abnormality         Status                     ---------                               -----------         ------                     CBC Auto Differential[[id number]]        Abnormal            Final result                 Please view results for these tests on the individual orders.                                            Medical Decision Making  Pt stable in EC - No evid of bowel obstruction/perf/ischemia.  + R UVJ stone.  No evid of infection.  Will start on pain control and given colace/reglan.  Pt reports has nausea control at home.  Will d/c.  D/w pt/family    Problems Addressed:  Difficulty urinating: complicated acute illness or injury  Pain of upper abdomen: complicated acute illness or injury    Amount and/or Complexity of Data Reviewed  Labs: ordered.  Radiology:  ordered.    Risk  Prescription drug management.        Final diagnoses:   Difficulty urinating   Pain of upper abdomen   Kidney stone       ED Disposition  ED Disposition       ED Disposition   Discharge    Condition   Stable    Comment   --               Carrie Darby, DO  [address] [place], [place] [id number]  [phone]               Medication List        New Prescriptions      docusate sodium 100 MG capsule  Commonly known as: COLACE  Take 1 capsule by mouth 2 (Two) Times a Day.     HYDROcodone-acetaminophen 5-325 MG per tablet  Commonly known as: NORCO  Take 1 tablet by mouth Every 6 (Six) Hours As Needed for Moderate Pain or Severe Pain.     metoclopramide 5 MG tablet  Commonly known as: REGLAN  Take 1 tablet by mouth 3 (Three) Times a Day As Needed (nausea).               Where to Get Your Medications        These medications were sent to [facility] [address] [place] [address] - [address] [place] [address] - [phone] [facility] [phone] [address] [address]      Phone: [phone]   docusate sodium 100 MG capsule  HYDROcodone-acetaminophen 5-325 MG per tablet  metoclopramide 5 MG tablet            Beto Reddy,   12/25/23 0217

## 2023-12-26 ENCOUNTER — TELEPHONE (OUTPATIENT)
Dept: HEMATOLOGY | Age: 73
End: 2023-12-26

## 2023-12-26 NOTE — TELEPHONE ENCOUNTER
Called patient and reviewed CBC results, platelets now 884,25 which ar up from 91,000. Patient states that she is still having occasional blood in her urine but she is trying to pass a kidney stone. Instructed to continue taking Eliquis 2.5 mg twice a day per COLTON Razo instructions and to call with any problems. Patient v/u.

## 2023-12-26 NOTE — TELEPHONE ENCOUNTER
"Reported was seen in ER yesterday and told has kidney stone. Was prescribed pain medicine but all the pharmacies are closed. Reported has tried over the counter med and is not helping.   Advised if having severe pain to go to  ER and have another adult drive  Reason for Disposition   [1] SEVERE pain (e.g., excruciating, scale 8-10) AND [2] present > 1 hour    Additional Information   Negative: Passed out (i.e., lost consciousness, collapsed and was not responding)   Negative: Shock suspected (e.g., cold/pale/clammy skin, too weak to stand, low BP, rapid pulse)   Negative: Difficult to awaken or acting confused (e.g., disoriented, slurred speech)   Negative: Sounds like a life-threatening emergency to the triager   Negative: Followed a major injury to the back (e.g., MVA, fall > 10 feet or 3 meters, penetrating injury, etc.)   Negative: Back pain or flank pain during pregnancy   Negative: Upper, mid or lower back pain that occurs mainly in the midline    Answer Assessment - Initial Assessment Questions  1. LOCATION: \"Where does it hurt?\" (e.g., left, right)      Right side  2. ONSET: \"When did the pain start?\"      All day  3. SEVERITY: \"How bad is the pain?\" (e.g., Scale 1-10; mild, moderate, or severe)    - MILD (1-3): doesn't interfere with normal activities     - MODERATE (4-7): interferes with normal activities or awakens from sleep     - SEVERE (8-10): excruciating pain and patient unable to do normal activities (stays in bed)        severe  4. PATTERN: \"Does the pain come and go, or is it constant?\"       constant  5. CAUSE: \"What do you think is causing the pain?\"      Kidney stone  6. OTHER SYMPTOMS:  \"Do you have any other symptoms?\" (e.g., fever, abdomen pain, vomiting, leg weakness, burning with urination, blood in urine)      Pressure, blood in urine  7. PREGNANCY:  \"Is there any chance you are pregnant?\" \"When was your last menstrual period?\"      N/a    Protocols used: Flank Pain-ADULT-AH    "

## 2024-01-02 DIAGNOSIS — I81 PORTAL VEIN THROMBOSIS: Primary | ICD-10-CM

## 2024-01-10 ENCOUNTER — TELEPHONE (OUTPATIENT)
Dept: HEMATOLOGY | Age: 74
End: 2024-01-10

## 2024-01-10 NOTE — TELEPHONE ENCOUNTER
Called pt. to remind them of appointment on 1/10/2024 and had to leave a detailed voicemail with appointment date and time.

## 2024-02-13 ENCOUNTER — TELEPHONE (OUTPATIENT)
Dept: HEMATOLOGY | Age: 74
End: 2024-02-13

## 2024-02-13 NOTE — TELEPHONE ENCOUNTER
Called patient to remind of appointment on 2/16/2024 and was unable to leave a message or talk to patient due the phone kept ringing and no one ever answered or no option to leave voicemail.

## 2024-02-16 DIAGNOSIS — K74.60 CIRRHOSIS OF LIVER WITHOUT ASCITES, UNSPECIFIED HEPATIC CIRRHOSIS TYPE (HCC): Primary | ICD-10-CM

## 2024-02-26 ENCOUNTER — TELEPHONE (OUTPATIENT)
Dept: HEMATOLOGY | Age: 74
End: 2024-02-26

## 2024-02-26 NOTE — TELEPHONE ENCOUNTER
Called to reschedule appt. She is scheduled for 4/17 at 10:45. She may need to callback and reschedule depending on her son's work schedule.  She also said she is still getting her blood drawn and she has been to [place].

## 2024-03-26 ENCOUNTER — TELEPHONE (OUTPATIENT)
Dept: VASCULAR SURGERY | Facility: CLINIC | Age: 74
End: 2024-03-26
Payer: MEDICARE

## 2024-03-26 ENCOUNTER — TELEPHONE (OUTPATIENT)
Dept: VASCULAR SURGERY | Facility: CLINIC | Age: 74
End: 2024-03-26

## 2024-03-26 NOTE — TELEPHONE ENCOUNTER
Caller: Reina Palomares    Relationship to patient: Self    Best call back number:     [phone] (Home)       Chief complaint: PT NEEDS TO R/S APPT    Type of visit: NEW PT    Requested date: 4/10/24     If rescheduling, when is the original appointment: 4/9/24     Additional notes:PT SPOKE WITH SOMEONE AT THE OFFICE AND THEY OFFERED A 9:45AM APPT ON 4/10/24 AND PT WOULD PREFER TO BE R/S TO THIS DATE/TIME BECAUSE HER SON BRINGS HER TO APPTS AND HE WILL BE IN [place] THIS DAY FOR HIS OWN APPT.  PLEASE GIVE PT A CALL BACK TO GET HER R/S IF POSSIBLE

## 2024-03-26 NOTE — TELEPHONE ENCOUNTER
Patient called requesting appointment be moved to 4/10/24 so that she could have appointment same day as son. Patient rescheduled for 4/10/24 @ 9:45 to see Sofía. Called back to confirm, but for VM instead. Left info on VM.

## 2024-04-09 ENCOUNTER — TELEPHONE (OUTPATIENT)
Dept: VASCULAR SURGERY | Facility: CLINIC | Age: 74
End: 2024-04-09
Payer: MEDICARE

## 2024-04-09 NOTE — TELEPHONE ENCOUNTER
Call placed to patient with no answer, voicemail left reminding patient of appointment on 4/10/2024

## 2024-04-10 ENCOUNTER — APPOINTMENT (OUTPATIENT)
Dept: CT IMAGING | Facility: HOSPITAL | Age: 74
End: 2024-04-10
Payer: MEDICARE

## 2024-04-10 ENCOUNTER — HOSPITAL ENCOUNTER (EMERGENCY)
Facility: HOSPITAL | Age: 74
Discharge: HOME | End: 2024-04-10
Attending: EMERGENCY MEDICINE
Payer: MEDICARE

## 2024-04-10 ENCOUNTER — OFFICE VISIT (OUTPATIENT)
Dept: VASCULAR SURGERY | Facility: CLINIC | Age: 74
End: 2024-04-10
Payer: MEDICARE

## 2024-04-10 VITALS
SYSTOLIC BLOOD PRESSURE: 123 MMHG | RESPIRATION RATE: 15 BRPM | WEIGHT: 194 LBS | DIASTOLIC BLOOD PRESSURE: 84 MMHG | BODY MASS INDEX: 33.12 KG/M2 | TEMPERATURE: 97.9 F | HEART RATE: 63 BPM | HEIGHT: 64 IN | OXYGEN SATURATION: 98 %

## 2024-04-10 VITALS
HEIGHT: 64 IN | SYSTOLIC BLOOD PRESSURE: 142 MMHG | OXYGEN SATURATION: 94 % | HEART RATE: 74 BPM | BODY MASS INDEX: 33.12 KG/M2 | DIASTOLIC BLOOD PRESSURE: 80 MMHG | WEIGHT: 194 LBS

## 2024-04-10 DIAGNOSIS — I73.9 PAD (PERIPHERAL ARTERY DISEASE): Primary | ICD-10-CM

## 2024-04-10 DIAGNOSIS — R06.09 CHRONIC DYSPNEA: Primary | ICD-10-CM

## 2024-04-10 DIAGNOSIS — I65.23 BILATERAL CAROTID ARTERY STENOSIS: ICD-10-CM

## 2024-04-10 LAB
A-A DO2: 26.4 MMHG
ALBUMIN SERPL-MCNC: 4.7 G/DL (ref 3.5–5.2)
ALBUMIN/GLOB SERPL: 1.1 G/DL
ALP SERPL-CCNC: 98 U/L (ref 39–117)
ALT SERPL W P-5'-P-CCNC: 55 U/L (ref 1–33)
ANION GAP SERPL CALCULATED.3IONS-SCNC: 9 MMOL/L (ref 5–15)
APTT PPP: 36.1 SECONDS (ref 24.5–36)
ARTERIAL PATENCY WRIST A: ABNORMAL
AST SERPL-CCNC: 68 U/L (ref 1–32)
ATMOSPHERIC PRESS: 745 MMHG
BASE EXCESS BLDA CALC-SCNC: 2.9 MMOL/L (ref 0–2)
BASOPHILS # BLD AUTO: 0.03 10*3/MM3 (ref 0–0.2)
BASOPHILS NFR BLD AUTO: 0.7 % (ref 0–1.5)
BDY SITE: ABNORMAL
BILIRUB SERPL-MCNC: 0.9 MG/DL (ref 0–1.2)
BODY TEMPERATURE: 37
BUN SERPL-MCNC: 12 MG/DL (ref 8–23)
BUN/CREAT SERPL: 21.1 (ref 7–25)
CALCIUM SPEC-SCNC: 9.7 MG/DL (ref 8.6–10.5)
CHLORIDE SERPL-SCNC: 103 MMOL/L (ref 98–107)
CO2 SERPL-SCNC: 29 MMOL/L (ref 22–29)
COHGB MFR BLD: 2.1 % (ref 0–5)
CREAT SERPL-MCNC: 0.57 MG/DL (ref 0.57–1)
D-LACTATE SERPL-SCNC: 1.1 MMOL/L (ref 0.5–2)
DEPRECATED RDW RBC AUTO: 48.9 FL (ref 37–54)
EGFRCR SERPLBLD CKD-EPI 2021: 96.7 ML/MIN/1.73
EOSINOPHIL # BLD AUTO: 0.1 10*3/MM3 (ref 0–0.4)
EOSINOPHIL NFR BLD AUTO: 2.2 % (ref 0.3–6.2)
ERYTHROCYTE [DISTWIDTH] IN BLOOD BY AUTOMATED COUNT: 13.7 % (ref 12.3–15.4)
FLUAV RNA RESP QL NAA+PROBE: NOT DETECTED
FLUBV RNA RESP QL NAA+PROBE: NOT DETECTED
GLOBULIN UR ELPH-MCNC: 4.1 GM/DL
GLUCOSE SERPL-MCNC: 126 MG/DL (ref 65–99)
HCO3 BLDA-SCNC: 27.6 MMOL/L (ref 20–26)
HCT VFR BLD AUTO: 40.5 % (ref 34–46.6)
HCT VFR BLD CALC: 40.4 % (ref 38–51)
HGB BLD-MCNC: 13.7 G/DL (ref 12–15.9)
HGB BLDA-MCNC: 13.2 G/DL (ref 12–16)
INR PPP: 1.32 (ref 0.91–1.09)
LYMPHOCYTES # BLD AUTO: 1.14 10*3/MM3 (ref 0.7–3.1)
LYMPHOCYTES NFR BLD AUTO: 25.4 % (ref 19.6–45.3)
Lab: ABNORMAL
MCH RBC QN AUTO: 32.9 PG (ref 26.6–33)
MCHC RBC AUTO-ENTMCNC: 33.8 G/DL (ref 31.5–35.7)
MCV RBC AUTO: 97.1 FL (ref 79–97)
METHGB BLD QL: 0.7 % (ref 0–3)
MODALITY: ABNORMAL
MONOCYTES # BLD AUTO: 0.42 10*3/MM3 (ref 0.1–0.9)
MONOCYTES NFR BLD AUTO: 9.4 % (ref 5–12)
NEUTROPHILS NFR BLD AUTO: 2.79 10*3/MM3 (ref 1.7–7)
NEUTROPHILS NFR BLD AUTO: 62.1 % (ref 42.7–76)
NT-PROBNP SERPL-MCNC: 40.3 PG/ML (ref 0–900)
OXYHGB MFR BLDV: 93.5 % (ref 94–99)
PCO2 BLDA: 42 MM HG (ref 35–45)
PCO2 TEMP ADJ BLD: 42 MM HG (ref 35–45)
PH BLDA: 7.43 PH UNITS (ref 7.35–7.45)
PH, TEMP CORRECTED: 7.43 PH UNITS (ref 7.35–7.45)
PLATELET # BLD AUTO: 108 10*3/MM3 (ref 140–450)
PMV BLD AUTO: 10.6 FL (ref 6–12)
PO2 BLDA: 72.7 MM HG (ref 83–108)
PO2 TEMP ADJ BLD: 72.7 MM HG (ref 83–108)
POTASSIUM BLDA-SCNC: 4 MMOL/L (ref 3.5–5.2)
POTASSIUM SERPL-SCNC: 4.2 MMOL/L (ref 3.5–5.2)
PROT SERPL-MCNC: 8.8 G/DL (ref 6–8.5)
PROTHROMBIN TIME: 16.9 SECONDS (ref 11.8–14.8)
QT INTERVAL: 400 MS
QTC INTERVAL: 434 MS
RBC # BLD AUTO: 4.17 10*6/MM3 (ref 3.77–5.28)
SAO2 % BLDCOA: 96.2 % (ref 94–99)
SARS-COV-2 RNA RESP QL NAA+PROBE: NOT DETECTED
SODIUM BLDA-SCNC: 144 MMOL/L (ref 136–145)
SODIUM SERPL-SCNC: 141 MMOL/L (ref 136–145)
TROPONIN T SERPL HS-MCNC: <6 NG/L
VENTILATOR MODE: ABNORMAL
WBC NRBC COR # BLD AUTO: 4.49 10*3/MM3 (ref 3.4–10.8)

## 2024-04-10 PROCEDURE — 87636 SARSCOV2 & INF A&B AMP PRB: CPT | Performed by: EMERGENCY MEDICINE

## 2024-04-10 PROCEDURE — 83605 ASSAY OF LACTIC ACID: CPT | Performed by: EMERGENCY MEDICINE

## 2024-04-10 PROCEDURE — 83880 ASSAY OF NATRIURETIC PEPTIDE: CPT | Performed by: EMERGENCY MEDICINE

## 2024-04-10 PROCEDURE — 87040 BLOOD CULTURE FOR BACTERIA: CPT | Performed by: EMERGENCY MEDICINE

## 2024-04-10 PROCEDURE — 71275 CT ANGIOGRAPHY CHEST: CPT

## 2024-04-10 PROCEDURE — 25510000001 IOPAMIDOL PER 1 ML: Performed by: EMERGENCY MEDICINE

## 2024-04-10 PROCEDURE — 93005 ELECTROCARDIOGRAM TRACING: CPT | Performed by: EMERGENCY MEDICINE

## 2024-04-10 PROCEDURE — 85730 THROMBOPLASTIN TIME PARTIAL: CPT | Performed by: EMERGENCY MEDICINE

## 2024-04-10 PROCEDURE — 82805 BLOOD GASES W/O2 SATURATION: CPT

## 2024-04-10 PROCEDURE — 80053 COMPREHEN METABOLIC PANEL: CPT | Performed by: EMERGENCY MEDICINE

## 2024-04-10 PROCEDURE — 36415 COLL VENOUS BLD VENIPUNCTURE: CPT

## 2024-04-10 PROCEDURE — 83050 HGB METHEMOGLOBIN QUAN: CPT

## 2024-04-10 PROCEDURE — 99285 EMERGENCY DEPT VISIT HI MDM: CPT

## 2024-04-10 PROCEDURE — 84484 ASSAY OF TROPONIN QUANT: CPT | Performed by: EMERGENCY MEDICINE

## 2024-04-10 PROCEDURE — 82375 ASSAY CARBOXYHB QUANT: CPT

## 2024-04-10 PROCEDURE — 36600 WITHDRAWAL OF ARTERIAL BLOOD: CPT

## 2024-04-10 PROCEDURE — 85610 PROTHROMBIN TIME: CPT | Performed by: EMERGENCY MEDICINE

## 2024-04-10 PROCEDURE — 85025 COMPLETE CBC W/AUTO DIFF WBC: CPT | Performed by: EMERGENCY MEDICINE

## 2024-04-10 RX ORDER — PROPRANOLOL HYDROCHLORIDE 20 MG/1
20 TABLET ORAL 2 TIMES DAILY
COMMUNITY

## 2024-04-10 RX ORDER — MORPHINE SULFATE 2 MG/ML
2 INJECTION, SOLUTION INTRAMUSCULAR; INTRAVENOUS ONCE
Status: DISCONTINUED | OUTPATIENT
Start: 2024-04-10 | End: 2024-04-10

## 2024-04-10 RX ORDER — LORATADINE 10 MG/1
10 TABLET ORAL DAILY
COMMUNITY

## 2024-04-10 RX ORDER — TROSPIUM CHLORIDE 20 MG/1
20 TABLET, FILM COATED ORAL DAILY
COMMUNITY
Start: 2024-03-22

## 2024-04-10 RX ORDER — SODIUM CHLORIDE 0.9 % (FLUSH) 0.9 %
10 SYRINGE (ML) INJECTION AS NEEDED
Status: DISCONTINUED | OUTPATIENT
Start: 2024-04-10 | End: 2024-04-10 | Stop reason: HOSPADM

## 2024-04-10 RX ADMIN — IOPAMIDOL 81 ML: 755 INJECTION, SOLUTION INTRAVENOUS at 13:24

## 2024-04-10 NOTE — PROGRESS NOTES
4/10/2024        Kelli Potts APRN  [address] [place] [id number]    Reina Palomares  1950    Chief Complaint   Patient presents with    NEW PATIENT      Referred from Kelli Potts for pain/swelling in legs. Left worse than right. Former smoker of 10 years, quit 2008. Patient is short of breath today. Has one lung, lost left lung to cancer in 2009.        Dear TRACEY Knight:      HPI  I had the pleasure of seeing your patient Reina Palomares in the office today.  Thank you kindly for this consultation.  As you recall, Reina Palomares is a 73 y.o.  female who you are currently following for routine health maintenance.  She comes today with complaints of bilateral lower extremity pain with walking, and she states that she feels her left upper leg is swollen more than her right.  She states that she cannot even vacuum her home without having to stop and rest.  Her legs feel heavy, and sometimes feel like they are going to give out on her.  She denies cramping.  All of her discomfort is in her calves and shin area.  She also states that she gets some numbness and tingling to her feet at times.  She does not wear compression stockings.  She is very short of breath today in office.  She has a history of lung cancer and has had her left lung removed.  She also has a history of hepatitis C, and is followed by a doctor in [place] for liver varices.  She denies history of DVT.  She denies any strokelike symptoms.  She is maintained on Eliquis.    Past Medical History:   Diagnosis Date    Cancer     LUNG    GERD (gastroesophageal reflux disease)     Hyperthyroidism     Infectious viral hepatitis     H/O HEPATITIS C    Swelling of lower extremity     Varices, esophageal        Past Surgical History:   Procedure Laterality Date    CARDIAC CATHETERIZATION      CHOLECYSTECTOMY  2020    [facility]    CLAVICLE SURGERY Right     COLONOSCOPY      DILATION AND CURETTAGE, DIAGNOSTIC / THERAPEUTIC      HYSTERECTOMY       TOTAL ABDOMINAL HYSTERECTOMY    KNEE ARTHROSCOPY Bilateral     LUNG REMOVAL, TOTAL Left     MYRINGOTOMY W/ TUBES      REPLACEMENT TOTAL KNEE Left     THYROID BIOPSY      BENIGN    TUBAL ABDOMINAL LIGATION      X2       Family History   Problem Relation Age of Onset    Cancer Other     Coronary artery disease Other     Thyroid disease Other     Melanoma Other     Melanoma Sister         LEFT ARM    Hypothyroidism Sister     Cancer Brother     Coronary artery disease Brother     Hyperthyroidism Sister     Other Mother     Other Father     Breast cancer Neg Hx        Social History     Socioeconomic History    Marital status:    Tobacco Use    Smoking status: Former     Current packs/day: 0.00     Types: Cigarettes     Quit date:      Years since quittin.2    Smokeless tobacco: Never    Tobacco comments:     LESS THAN 1 PPD FOR 12 YEARS   Substance and Sexual Activity    Alcohol use: Yes     Comment: OCCASIONAL WINE    Drug use: No    Sexual activity: Defer     Partners: Male     Birth control/protection: None       Allergies   Allergen Reactions    Dilaudid [Hydromorphone Hcl] Hallucinations       Prior to Admission medications    Medication Sig Start Date End Date Taking? Authorizing Provider   apixaban (ELIQUIS) 2.5 MG tablet tablet Take 1 tablet by mouth Every 12 (Twelve) Hours. 23  Yes ProviderLeticia MD   levothyroxine (SYNTHROID, LEVOTHROID) 88 MCG tablet Take 1 tablet by mouth Daily.   Yes ProviderLeticia MD   loratadine (CLARITIN) 10 MG tablet Take 1 tablet by mouth Daily.   Yes ProviderLeticia MD   propranolol (INDERAL) 20 MG tablet Take 1 tablet by mouth 2 (Two) Times a Day.   Yes ProviderLeticia MD   tiZANidine (ZANAFLEX) 4 MG tablet Take 1 tablet by mouth At Night As Needed for Muscle Spasms.   Yes ProviderLeticia MD   trospium (SANCTURA) 20 MG tablet Take 1 tablet by mouth Daily. 3/22/24  Yes Leticia Deshpande MD   docusate sodium (COLACE) 100 MG  "capsule Take 1 capsule by mouth 2 (Two) Times a Day.  Patient not taking: Reported on 4/10/2024 12/25/23   Beto Reddy DO   HYDROcodone-acetaminophen (NORCO) 5-325 MG per tablet Take 1 tablet by mouth Every 6 (Six) Hours As Needed for Moderate Pain or Severe Pain.  Patient not taking: Reported on 4/10/2024 12/25/23   Beto Reddy DO   metoclopramide (REGLAN) 5 MG tablet Take 1 tablet by mouth 3 (Three) Times a Day As Needed (nausea).  Patient not taking: Reported on 4/10/2024 12/25/23   Beto Rdedy DO   metoprolol tartrate (LOPRESSOR) 25 MG tablet Take 25 mg by mouth 2 (Two) Times a Day.  Patient not taking: Reported on 4/10/2024    Provider, Leticia, MD       Review of Systems   Constitutional: Negative.  Negative for diaphoresis and fever.   HENT: Negative.     Eyes: Negative.    Respiratory: Negative.  Positive for shortness of breath. Negative for wheezing.    Cardiovascular: Negative.  Positive for leg swelling.   Gastrointestinal: Negative.  Negative for abdominal pain.   Endocrine: Negative.    Genitourinary: Negative.    Musculoskeletal: Negative.  Negative for gait problem.   Skin: Negative.    Allergic/Immunologic: Negative.    Neurological: Negative.  Positive for numbness. Negative for dizziness.   Hematological: Negative.    Psychiatric/Behavioral: Negative.         /80   Pulse 74   Ht 162.6 cm (64\")   Wt 88 kg (194 lb)   SpO2 94%   BMI 33.30 kg/m²   Physical Exam  Vitals and nursing note reviewed.   Constitutional:       General: She is not in acute distress.     Appearance: Normal appearance. She is obese. She is not diaphoretic.   HENT:      Head: Normocephalic. No right periorbital erythema or left periorbital erythema.      Nose: Nose normal.   Eyes:      General: No scleral icterus.     Pupils: Pupils are equal.   Cardiovascular:      Rate and Rhythm: Normal rate and regular rhythm.      Pulses:           Femoral pulses are 2+ on the right side and " 2+ on the left side.       Popliteal pulses are 2+ on the right side and 2+ on the left side.        Dorsalis pedis pulses are 2+ on the right side and 2+ on the left side.        Posterior tibial pulses are 2+ on the right side and 2+ on the left side.      Heart sounds: Normal heart sounds. No murmur heard.  Pulmonary:      Effort: Pulmonary effort is normal. No respiratory distress.      Breath sounds: Normal breath sounds.   Abdominal:      General: Bowel sounds are normal. There is no distension.      Palpations: Abdomen is soft.      Tenderness: There is no abdominal tenderness. There is no guarding.   Musculoskeletal:         General: No swelling or tenderness. Normal range of motion.      Cervical back: Normal range of motion and neck supple.      Right lower leg: No edema.      Left lower leg: No edema.   Feet:      Right foot:      Skin integrity: Skin integrity normal.      Left foot:      Skin integrity: Skin integrity normal.   Skin:     General: Skin is warm and dry.      Findings: No erythema or rash.   Neurological:      General: No focal deficit present.      Mental Status: She is alert and oriented to person, place, and time. Mental status is at baseline.      Cranial Nerves: No cranial nerve deficit.      Gait: Gait normal.   Psychiatric:         Attention and Perception: Attention normal.         Mood and Affect: Mood normal.         CT abdomen with contrast    Result Date: 4/4/2024  Narrative: CONTRAST-ENHANCED CT OF THE ABDOMEN REASON FOR STUDY/CLINICAL HISTORY: K74.69Other cirrhosis of liver (CMS/HCC);Additional Information [H1ST]:->Non occlusive PVT. Please r/o evidence of HCC, ascites, PVT, in the setting of cirrhosis. COMPARISON: 1/22/2024 TECHNIQUE: Axial CT images of the abdomen were obtained after the IV administration of contrast. Multiplanar reformats were also generated.  Images through the abdomen were obtained in late arterial, portal venous, and delayed phases. INTRAVENOUS CONTRAST  ADMINISTRATION: IV Contrast Dose is documented in the electronic medical record DOSE REPORT: Total DLP: 1027 mGycm . Count of known CT and Cardiac Nuclear Medicine studies performed in previous 12 months = 1 Dose modulation was employed for SARA by means of: Automated exposure control; adjustment of the mA and/or kV according to patient size (this includes techniques or standardized protocols for targeted exams where dose is matched to indication/reason for  exam; i.e. extremities or head); and/or use of iterative reconstructive technique. FINDINGS: Lower thorax: Postsurgical changes from left pneumonectomy and leftward mediastinal shift. Hyperexpansion of the right lung. No pleural effusion. Liver: Cirrhotic morphology of the liver. No suspicious foci of arterial hyperenhancement or washout. Gallbladder/Biliary tree: Cholecystectomy. No biliary dilatation. Spleen: Splenomegaly. Pancreas: Unremarkable. Adrenals: Unremarkable. Kidneys/Ureters: No nephrolithiasis or hydronephrosis. Gastrointestinal: No bowel obstruction. Peritoneum: No free fluid or free gas. Vasculature: Atherosclerotic calcifications. Accessory left hepatic artery arising from the left gastric artery. Heterogeneous enhancement of the portal vein with decreased nonocclusive thrombus. Gastroesophageal portosystemic collaterals. Lymph Nodes: No lymphadenopathy. Abdominal Wall: Unremarkable. MUSCULOSKELETAL: Degenerative changes of the spine.    Impression: 1.  Cirrhosis with evidence of portal hypertension including splenomegaly and portosystemic collaterals. 2.  No suspicious hepatic lesion. 3.  Heterogeneous enhancement portal vein with decreased nonocclusive thrombus. Electronically signed by  Luanne Long MD on 4/4/2024 4:11 PM    X-ray chest 1 view    Result Date: 4/1/2024  Narrative: XR CHEST 1 VW HISTORY: Shortness of breath;Additional Information [H1ST]:-> COMPARISON: CT abdomen 1/4/2024, CT chest, abdomen, and pelvis 1/6/2019 FINDINGS: PA  radiograph of the chest was obtained. Postoperative findings status post left pneumonectomy with shift of mediastinal structures to the left hemithorax. The right lung is clear. No focal consolidation. No right-sided pleural effusion or pneumothorax. Evaluation of mediastinal structures and the left mid thorax is limited. Moderate multilevel degenerative changes noted throughout the thoracic spine.    Impression: Postoperative findings status post left pneumonectomy with shift of mediastinal structures to the left. No acute cardiopulmonary findings. Electronically signed by  David Ayers on 4/1/2024 3:51 PM     Patient Active Problem List   Diagnosis    Swelling of lower extremity    GERD (gastroesophageal reflux disease)    Hypothyroidism    Chest pain, atypical    History of lung cancer    S/P lobectomy of lung    Chest pain         ICD-10-CM ICD-9-CM   1. PAD (peripheral artery disease)  I73.9 443.9   2. Bilateral carotid artery stenosis  I65.23 433.10     433.30           Plan: After thoroughly evaluating Reina Palomares, I believe the best course of action is to obtain further testing.  I will see the patient back in 2 weeks with ABIs and carotid duplex for further evaluation.  Patient is very short of breath I have instructed her to go to the ER, our MA has wheeled her over there.  Her pulses are palpable, her feet were warm, and no visible swelling that I could see.  She does complain of pain with walking long distances, or while cleaning her home.  The numbness and tingling the patient complains about is likely neurogenic stemming from may be a back issue as she is not diabetic at this time. I did discuss vascular risk factors as they pertain to the progression of vascular disease including controlling hypertension.  Her blood pressure is slightly elevated at 142/80, if this continues she would need to follow-up with her PCP for further recommendation.  The patient can continue taking their current  medication regimen as previously planned.  This was all discussed in full with complete understanding.    Thank you for allowing me to participate in the care of your patient.  Please do not hesitate with any questions or concerns.  I will keep you aware of any further encounters with Reina Palomares.        Sincerely yours,         TRACEY Li

## 2024-04-10 NOTE — ED PROVIDER NOTES
Subjective   History of Present Illness  Patient is a 73-year-old female with a history of lung cancer and liver disease who presents to the ER with shortness of breath.  Patient states she has had shortness of air for the last month, progressively worsening.  She denies any fever, chest pain, abdominal pain, nausea vomiting diarrhea, urinary changes, neurologic changes, cough.  Nothing really makes her symptoms better or worse.      Review of Systems   Constitutional: Negative.    HENT: Negative.     Eyes: Negative.    Respiratory: Negative.  Positive for shortness of breath.    Cardiovascular: Negative.    Gastrointestinal: Negative.    Endocrine: Negative.    Genitourinary: Negative.    Musculoskeletal: Negative.    Skin: Negative.    Allergic/Immunologic: Negative.    Neurological: Negative.    Hematological: Negative.    Psychiatric/Behavioral: Negative.     All other systems reviewed and are negative.      Past Medical History:   Diagnosis Date    Cancer     LUNG    GERD (gastroesophageal reflux disease)     Hyperthyroidism     Infectious viral hepatitis     H/O HEPATITIS C    Swelling of lower extremity     Varices, esophageal        Allergies   Allergen Reactions    Dilaudid [Hydromorphone Hcl] Hallucinations       Past Surgical History:   Procedure Laterality Date    CARDIAC CATHETERIZATION      CHOLECYSTECTOMY  2020    [facility]    CLAVICLE SURGERY Right     COLONOSCOPY      DILATION AND CURETTAGE, DIAGNOSTIC / THERAPEUTIC      HYSTERECTOMY      TOTAL ABDOMINAL HYSTERECTOMY    KNEE ARTHROSCOPY Bilateral     LUNG REMOVAL, TOTAL Left     MYRINGOTOMY W/ TUBES      REPLACEMENT TOTAL KNEE Left     THYROID BIOPSY      BENIGN    TUBAL ABDOMINAL LIGATION      X2       Family History   Problem Relation Age of Onset    Cancer Other     Coronary artery disease Other     Thyroid disease Other     Melanoma Other     Melanoma Sister         LEFT ARM    Hypothyroidism Sister     Cancer Brother     Coronary artery disease  Brother     Hyperthyroidism Sister     Other Mother     Other Father     Breast cancer Neg Hx        Social History     Socioeconomic History    Marital status:    Tobacco Use    Smoking status: Former     Current packs/day: 0.00     Types: Cigarettes     Quit date:      Years since quittin.2    Smokeless tobacco: Never    Tobacco comments:     LESS THAN 1 PPD FOR 12 YEARS   Substance and Sexual Activity    Alcohol use: Yes     Comment: OCCASIONAL WINE    Drug use: No    Sexual activity: Defer     Partners: Male     Birth control/protection: None           Objective   Physical Exam  Vitals and nursing note reviewed.   Constitutional:       Appearance: She is well-developed.   HENT:      Head: Normocephalic and atraumatic.   Eyes:      Conjunctiva/sclera: Conjunctivae normal.      Pupils: Pupils are equal, round, and reactive to light.   Cardiovascular:      Rate and Rhythm: Normal rate and regular rhythm.      Heart sounds: Normal heart sounds.   Pulmonary:      Effort: Pulmonary effort is normal.      Breath sounds: Normal breath sounds.   Abdominal:      Palpations: Abdomen is soft.      Tenderness: There is no abdominal tenderness.   Musculoskeletal:         General: No deformity. Normal range of motion.      Cervical back: Normal range of motion.   Skin:     General: Skin is warm.   Neurological:      Mental Status: She is alert and oriented to person, place, and time.   Psychiatric:         Behavior: Behavior normal.         Procedures           ED Course      EKG as interpreted by me: Normal sinus rhythm with a rate of 71, nonspecific ST changes                           Lab Results (last 24 hours)       Procedure Component Value Units Date/Time    COVID-19 and FLU A/B PCR, 1 HR TAT - Swab, Nasopharynx [[id number]]  (Normal) Collected: 04/10/24 1113    Specimen: Swab from Nasopharynx Updated: 04/10/24 1215     COVID19 Not Detected     Influenza A PCR Not Detected     Influenza B PCR Not Detected     Narrative:      Fact sheet for providers: https://www.fda.gov/media/692679/download    Fact sheet for patients: https://www.fda.gov/media/934026/download    Test performed by PCR.    CBC & Differential [[id number]]  (Abnormal) Collected: 04/10/24 1114    Specimen: Blood Updated: 04/10/24 1137    Narrative:      The following orders were created for panel order CBC & Differential.  Procedure                               Abnormality         Status                     ---------                               -----------         ------                     CBC Auto Differential[[id number]]        Abnormal            Final result                 Please view results for these tests on the individual orders.    Comprehensive Metabolic Panel [[id number]]  (Abnormal) Collected: 04/10/24 1114    Specimen: Blood Updated: 04/10/24 1157     Glucose 126 mg/dL      BUN 12 mg/dL      Creatinine 0.57 mg/dL      Sodium 141 mmol/L      Potassium 4.2 mmol/L      Chloride 103 mmol/L      CO2 29.0 mmol/L      Calcium 9.7 mg/dL      Total Protein 8.8 g/dL      Albumin 4.7 g/dL      ALT (SGPT) 55 U/L      AST (SGOT) 68 U/L      Alkaline Phosphatase 98 U/L      Total Bilirubin 0.9 mg/dL      Globulin 4.1 gm/dL      A/G Ratio 1.1 g/dL      BUN/Creatinine Ratio 21.1     Anion Gap 9.0 mmol/L      eGFR 96.7 mL/min/1.73     Narrative:      GFR Normal >60  Chronic Kidney Disease <60  Kidney Failure <15    The GFR formula is only valid for adults with stable renal function between ages 18 and 70.    Protime-INR [[id number]]  (Abnormal) Collected: 04/10/24 1114    Specimen: Blood Updated: 04/10/24 1136     Protime 16.9 Seconds      INR 1.32    aPTT [[id number]]  (Abnormal) Collected: 04/10/24 1114    Specimen: Blood Updated: 04/10/24 1136     PTT 36.1 seconds     BNP [[id number]]  (Normal) Collected: 04/10/24 1114    Specimen: Blood Updated: 04/10/24 1153     proBNP 40.3 pg/mL     Narrative:      This assay is used as an aid in the diagnosis of  individuals suspected of having heart failure. It can be used as an aid in the diagnosis of acute decompensated heart failure (ADHF) in patients presenting with signs and symptoms of ADHF to the emergency department (ED). In addition, NT-proBNP of <300 pg/mL indicates ADHF is not likely.    Age Range Result Interpretation  NT-proBNP Concentration (pg/mL:      <50             Positive            >450                   Gray                 300-450                    Negative             <300    50-75           Positive            >900                  Gray                300-900                  Negative            <300      >75             Positive            >1800                  Gray                300-1800                  Negative            <300    High Sensitivity Troponin T [[id number]]  (Normal) Collected: 04/10/24 1114    Specimen: Blood Updated: 04/10/24 1152     HS Troponin T <6 ng/L     Narrative:      High Sensitive Troponin T Reference Range:  <14.0 ng/L- Negative Female for AMI  <22.0 ng/L- Negative Male for AMI  >=14 - Abnormal Female indicating possible myocardial injury.  >=22 - Abnormal Male indicating possible myocardial injury.   Clinicians would have to utilize clinical acumen, EKG, Troponin, and serial changes to determine if it is an Acute Myocardial Infarction or myocardial injury due to an underlying chronic condition.         Blood Culture - Blood, Arm, Left [[id number]] Collected: 04/10/24 1114    Specimen: Blood from Arm, Left Updated: 04/10/24 1127    Lactic Acid, Plasma [[id number]]  (Normal) Collected: 04/10/24 1114    Specimen: Blood Updated: 04/10/24 1153     Lactate 1.1 mmol/L     CBC Auto Differential [[id number]]  (Abnormal) Collected: 04/10/24 1114    Specimen: Blood Updated: 04/10/24 1137     WBC 4.49 10*3/mm3      RBC 4.17 10*6/mm3      Hemoglobin 13.7 g/dL      Hematocrit 40.5 %      MCV 97.1 fL      MCH 32.9 pg      MCHC 33.8 g/dL      RDW 13.7 %      RDW-SD 48.9 fl      MPV  10.6 fL      Platelets 108 10*3/mm3      Neutrophil % 62.1 %      Lymphocyte % 25.4 %      Monocyte % 9.4 %      Eosinophil % 2.2 %      Basophil % 0.7 %      Neutrophils, Absolute 2.79 10*3/mm3      Lymphocytes, Absolute 1.14 10*3/mm3      Monocytes, Absolute 0.42 10*3/mm3      Eosinophils, Absolute 0.10 10*3/mm3      Basophils, Absolute 0.03 10*3/mm3     Blood Culture - Blood, Hand, Left [[id number]] Collected: 04/10/24 1117    Specimen: Blood from Hand, Left Updated: 04/10/24 1127    Blood Gas, Arterial With Co-Ox [[id number]]  (Abnormal) Collected: 04/10/24 1134    Specimen: Arterial Blood Updated: 04/10/24 1137     Site Right Brachial     Kapil's Test N/A     pH, Arterial 7.426 pH units      pCO2, Arterial 42.0 mm Hg      pO2, Arterial 72.7 mm Hg      Comment: 84 Value below reference range        HCO3, Arterial 27.6 mmol/L      Comment: 83 Value above reference range        Base Excess, Arterial 2.9 mmol/L      Comment: 83 Value above reference range        O2 Saturation, Arterial 96.2 %      Hemoglobin, Blood Gas 13.2 g/dL      Hematocrit, Blood Gas 40.4 %      Oxyhemoglobin 93.5 %      Comment: 84 Value below reference range        Methemoglobin 0.70 %      Carboxyhemoglobin 2.1 %      A-a DO2 26.4 mmHg      Temperature 37.0     Sodium, Arterial 144 mmol/L      Potassium, Arterial 4.0 mmol/L      Barometric Pressure for Blood Gas 745 mmHg      Modality Room Air     Ventilator Mode NA     Collected by [id number]     Comment: Meter: [id number]     :  [id number]        pH, Temp Corrected 7.426 pH Units      pCO2, Temperature Corrected 42.0 mm Hg      pO2, Temperature Corrected 72.7 mm Hg            CT Angiogram Chest   Final Result   1.  No evidence of pulmonary embolus.   2.  Prior left pneumonectomy with hyperexpansion of the right lung and   right to left mediastinal shift. The right lung is clear and well   expanded. No acute infiltrate.   3.  Coronary artery atheromatous calcification. No pericardial  effusion.   4.  Lower paraesophageal and gastric varices suggesting portal   hypertension. No ascites identified in the upper abdomen.            This report was signed and finalized on 4/10/2024 1:41 PM by Dr Raheel Kate.                       Medical Decision Making  Patient is a 73-year-old female with a history of lung cancer and liver disease who presents to the ER with shortness of breath.  Patient states she has had shortness of air for the last month, progressively worsening.  She denies any fever, chest pain, abdominal pain, nausea vomiting diarrhea, urinary changes, neurologic changes, cough.  Nothing really makes her symptoms better or worse.    Differential diagnosis: Pneumonia, viral syndrome, pulmonary embolus, acute coronary syndrome, congestive heart failure    Labs showed mildly elevated ALT and AST and mild thrombocytopenia consistent with the patient's known liver issues.  Patient has no signs of congestive heart failure or acute coronary syndrome.  CT of the chest showed no evidence of pulmonary embolus.  Patient did have a prior left pneumonectomy with hyperexpansion of the right lung and right to left mediastinal shift.  There is no evidence of pneumonia.  There is no pericardial effusion.  No cause for the patient's chronic dyspnea was found.  She had good sats while here in the ER and was in no distress here.  She met no indication for admission at this time.  She will be discharged home to follow-up with her PCP and pulmonary.  An outpatient stress test has been ordered for the patient as well.  She is to return to the ER for any worsening or new symptoms, pain, fever or other concerns.  Patient and family agreeable to plan and patient was discharged home.    Problems Addressed:  Chronic dyspnea: chronic illness or injury    Amount and/or Complexity of Data Reviewed  Labs: ordered. Decision-making details documented in ED Course.  Radiology: ordered. Decision-making details documented in  ED Course.  ECG/medicine tests: ordered. Decision-making details documented in ED Course.    Risk  Prescription drug management.        Final diagnoses:   Chronic dyspnea       ED Disposition  ED Disposition       ED Disposition   Discharge    Condition   Good    Comment   --               Carrie Darby, DO  [address] [facility] [address]  [phone]    Schedule an appointment as soon as possible for a visit       Carlos Cuevas MD  [address] [facility] [id number] [phone]    Schedule an appointment as soon as possible for a visit            Medication List      No changes were made to your prescriptions during this visit.            Bonnie Alvarez MD  04/10/24 [id number]
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
## 2024-04-12 ENCOUNTER — TELEPHONE (OUTPATIENT)
Dept: VASCULAR SURGERY | Facility: CLINIC | Age: 74
End: 2024-04-12

## 2024-04-12 NOTE — TELEPHONE ENCOUNTER
Spoke with patient she is scheduled for 05/20/24 due to transportation. Patient verbalized understanding and will call with any questions or concerns.

## 2024-04-12 NOTE — TELEPHONE ENCOUNTER
Caller: Reina Palomares    Relationship: Self    Best call back number: [phone]     What is the best time to reach you: ANY    Who are you requesting to speak with (clinical staff, provider,  specific staff member): [facility] STAFF    Do you know the name of the person who called: PT    What was the call regarding: PT PANCHAL DIN TO [facility] APPT TO 4/30/24 OR AFTER-FIRST AVAILABLE DAY FOR PT TRANSPO AND B LAX JENNIFER IS 5/20/24-PLEASE CALL PT PT AIF WE CAN GET HER IN ON 4/30/24    Is it okay if the provider responds through [employer]: NO PLEASE CALL CAN LEAVE VM

## 2024-04-15 ENCOUNTER — TELEPHONE (OUTPATIENT)
Dept: HEMATOLOGY | Age: 74
End: 2024-04-15

## 2024-04-15 LAB
BACTERIA SPEC AEROBE CULT: NORMAL
BACTERIA SPEC AEROBE CULT: NORMAL

## 2024-04-15 NOTE — TELEPHONE ENCOUNTER
Called patient and reminded patient of their appointment on 4/17/2024 and patient confirmed they would be here.

## 2024-04-16 LAB
QT INTERVAL: 400 MS
QTC INTERVAL: 434 MS

## 2024-04-16 NOTE — PROGRESS NOTES
Progress Note      Pt Name: Ivelisse Renteria  YOB: 1950  MRN: [id number]    Date of evaluation: 06/14/2024  History Obtained From:  patient, electronic medical record    CHIEF COMPLAINT:    Chief Complaint   Patient presents with    Follow-up     Malignant neoplasm of upper lobe of left lung  Thrombocytopenia     HISTORY OF PRESENT ILLNESS:    Ivelisse Renteria is a 73 y.o.  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  She has no known radiographic imaging to suggest progression of carcinoma and is currently anticoagulated with Eliquis 5 mg p.o. twice daily.  Ivelisse returns today in scheduled follow-up for evaluation, lab monitoring, and further treatment recommendations.      Today's clinic visit to include physical assessment, review of systems, any radiograph or lab findings that were available and plan of care are documented below.     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 Grey     Ivelisse Renteria was first seen by Dr. Aguirre 1/26/2021.  She was referred by her primary care provider for

## 2024-04-30 ENCOUNTER — HOSPITAL ENCOUNTER (OUTPATIENT)
Dept: ULTRASOUND IMAGING | Facility: HOSPITAL | Age: 74
Discharge: HOME | End: 2024-04-30
Payer: MEDICARE

## 2024-04-30 ENCOUNTER — HOSPITAL ENCOUNTER (OUTPATIENT)
Dept: CARDIOLOGY | Facility: HOSPITAL | Age: 74
Discharge: HOME | End: 2024-04-30
Payer: MEDICARE

## 2024-04-30 VITALS
BODY MASS INDEX: 30.39 KG/M2 | DIASTOLIC BLOOD PRESSURE: 84 MMHG | SYSTOLIC BLOOD PRESSURE: 150 MMHG | WEIGHT: 178 LBS | HEART RATE: 67 BPM | HEIGHT: 64 IN

## 2024-04-30 DIAGNOSIS — I73.9 PAD (PERIPHERAL ARTERY DISEASE): ICD-10-CM

## 2024-04-30 DIAGNOSIS — I65.23 BILATERAL CAROTID ARTERY STENOSIS: ICD-10-CM

## 2024-04-30 DIAGNOSIS — R06.09 CHRONIC DYSPNEA: ICD-10-CM

## 2024-04-30 LAB
BH CV STRESS BP STAGE 1: NORMAL
BH CV STRESS BP STAGE 2: NORMAL
BH CV STRESS BP STAGE 3: NORMAL
BH CV STRESS BP STAGE 4: NORMAL
BH CV STRESS BP STAGE 5: NORMAL
BH CV STRESS DOB - ATROPINE STAGE 3: 1
BH CV STRESS DOB - ATROPINE STAGE 4: 0.6
BH CV STRESS DOSE DOBUTAMINE STAGE 1: 10
BH CV STRESS DOSE DOBUTAMINE STAGE 2: 20
BH CV STRESS DOSE DOBUTAMINE STAGE 3: 30
BH CV STRESS DOSE DOBUTAMINE STAGE 4: 40
BH CV STRESS DOSE DOBUTAMINE STAGE 5: 50
BH CV STRESS DURATION MIN STAGE 1: 3
BH CV STRESS DURATION MIN STAGE 2: 3
BH CV STRESS DURATION MIN STAGE 3: 3
BH CV STRESS DURATION MIN STAGE 4: 3
BH CV STRESS DURATION MIN STAGE 5: 1
BH CV STRESS DURATION SEC STAGE 1: 0
BH CV STRESS DURATION SEC STAGE 2: 0
BH CV STRESS DURATION SEC STAGE 3: 0
BH CV STRESS DURATION SEC STAGE 4: 0
BH CV STRESS DURATION SEC STAGE 5: 38
BH CV STRESS HR STAGE 1: 66
BH CV STRESS HR STAGE 2: 72
BH CV STRESS HR STAGE 3: 108
BH CV STRESS HR STAGE 4: 123
BH CV STRESS HR STAGE 5: 130
BH CV STRESS PROTOCOL 1: NORMAL
BH CV STRESS RECOVERY BP: NORMAL MMHG
BH CV STRESS RECOVERY HR: 89 BPM
BH CV STRESS STAGE 1: 1
BH CV STRESS STAGE 2: 2
BH CV STRESS STAGE 3: 3
BH CV STRESS STAGE 4: 4
BH CV STRESS STAGE 5: 5
MAXIMAL PREDICTED HEART RATE: 148 BPM
PERCENT MAX PREDICTED HR: 87.84 %
STRESS BASELINE BP: NORMAL MMHG
STRESS BASELINE HR: 68 BPM
STRESS PERCENT HR: 103 %
STRESS POST EXERCISE DUR MIN: 13 MIN
STRESS POST EXERCISE DUR SEC: 38 SEC
STRESS POST PEAK BP: NORMAL MMHG
STRESS POST PEAK HR: 130 BPM
STRESS TARGET HR: 126 BPM

## 2024-04-30 PROCEDURE — 93880 EXTRACRANIAL BILAT STUDY: CPT | Performed by: SURGERY

## 2024-04-30 PROCEDURE — 93923 UPR/LXTR ART STDY 3+ LVLS: CPT | Performed by: SURGERY

## 2024-04-30 PROCEDURE — 25010000002 DOBUTAMINE 250 MG/20ML SOLUTION: Performed by: INTERNAL MEDICINE

## 2024-04-30 PROCEDURE — 93880 EXTRACRANIAL BILAT STUDY: CPT

## 2024-04-30 PROCEDURE — 93350 STRESS TTE ONLY: CPT | Performed by: INTERNAL MEDICINE

## 2024-04-30 PROCEDURE — 93018 CV STRESS TEST I&R ONLY: CPT | Performed by: INTERNAL MEDICINE

## 2024-04-30 PROCEDURE — 93352 ADMIN ECG CONTRAST AGENT: CPT | Performed by: INTERNAL MEDICINE

## 2024-04-30 PROCEDURE — 93923 UPR/LXTR ART STDY 3+ LVLS: CPT

## 2024-04-30 PROCEDURE — 25510000001 PERFLUTREN 6.52 MG/ML SUSPENSION: Performed by: INTERNAL MEDICINE

## 2024-04-30 PROCEDURE — 93017 CV STRESS TEST TRACING ONLY: CPT

## 2024-04-30 PROCEDURE — 25010000002 ATROPINE SULFATE: Performed by: INTERNAL MEDICINE

## 2024-04-30 PROCEDURE — 93350 STRESS TTE ONLY: CPT

## 2024-04-30 RX ORDER — DOBUTAMINE HYDROCHLORIDE 100 MG/100ML
10-50 INJECTION INTRAVENOUS CONTINUOUS
Status: DISCONTINUED | OUTPATIENT
Start: 2024-04-30 | End: 2024-05-01 | Stop reason: HOSPADM

## 2024-04-30 RX ORDER — METOPROLOL TARTRATE 1 MG/ML
5 INJECTION, SOLUTION INTRAVENOUS ONCE
Status: DISCONTINUED | OUTPATIENT
Start: 2024-04-30 | End: 2024-05-01 | Stop reason: HOSPADM

## 2024-04-30 RX ADMIN — ATROPINE SULFATE 1.6 MG: 0.1 INJECTION INTRAVENOUS at 14:14

## 2024-04-30 RX ADMIN — DOBUTAMINE 10 MCG/KG/MIN: 12.5 INJECTION, SOLUTION, CONCENTRATE INTRAVENOUS at 13:59

## 2024-04-30 RX ADMIN — PERFLUTREN 8.48 MG: 6.52 INJECTION, SUSPENSION INTRAVENOUS at 13:59

## 2024-05-01 ENCOUNTER — TELEPHONE (OUTPATIENT)
Dept: EMERGENCY DEPT | Facility: HOSPITAL | Age: 74
End: 2024-05-01
Payer: MEDICARE

## 2024-05-21 ENCOUNTER — TELEPHONE (OUTPATIENT)
Dept: VASCULAR SURGERY | Facility: CLINIC | Age: 74
End: 2024-05-21
Payer: MEDICARE

## 2024-05-21 NOTE — TELEPHONE ENCOUNTER
HUB TO RELAY   Called patient to confirm appointment on 05/22/24 at 11:30 am. Patient did not answer left VM.

## 2024-05-22 ENCOUNTER — OFFICE VISIT (OUTPATIENT)
Dept: VASCULAR SURGERY | Facility: CLINIC | Age: 74
End: 2024-05-22
Payer: MEDICARE

## 2024-05-22 VITALS
BODY MASS INDEX: 33.63 KG/M2 | DIASTOLIC BLOOD PRESSURE: 82 MMHG | HEIGHT: 64 IN | SYSTOLIC BLOOD PRESSURE: 142 MMHG | OXYGEN SATURATION: 96 % | HEART RATE: 61 BPM | WEIGHT: 197 LBS

## 2024-05-22 DIAGNOSIS — I87.323 CHRONIC VENOUS HYPERTENSION WITH INFLAMMATION INVOLVING BOTH SIDES: Primary | ICD-10-CM

## 2024-05-22 NOTE — PROGRESS NOTES
"5/22/2024        Carrie Darby, DO  [address] [facility] [address]      Reina Palomares  1950    Chief Complaint   Patient presents with    Follow-up     2 week follow up w/ testing. Last seen 4/10/24. Patient denies any changes in legs since last visit.        Dear Dr. Carrie Darby:      HPI  I had the pleasure of seeing your patient Reina Palomares in the office today. As you recall, Reina Palomares is a 73 y.o.  female who you are currently following for teen health maintenance.  She is here today for 2-week follow-up with testing.  Originally her complaints were pain with walking, swelling to her upper left thigh compared to her right.  She stated she could not even vacuum at home without having to stop and rest.  She stated all of her discomfort was in her calves and her shin areas.  She did complain of numbness and tingling at her feet from time to time.  She has a history of hepatitis C and is followed by doctor in [place] for liver varices.  She also has a history of lung cancer and has had her left lung removed.  She denied a history of DVT.  She is were maintained on Eliquis.  She did have noninvasive testing performed today, which I did review in office.      Review of Systems   Constitutional: Negative.  Negative for diaphoresis and fever.   HENT: Negative.     Eyes: Negative.    Respiratory:  Positive for shortness of breath. Negative for wheezing.    Cardiovascular:  Positive for leg swelling.        Left upper thigh   Gastrointestinal: Negative.  Negative for abdominal pain.   Endocrine: Negative.    Genitourinary: Negative.    Musculoskeletal: Negative.  Negative for gait problem.   Skin: Negative.    Allergic/Immunologic: Negative.    Neurological: Negative.  Negative for dizziness and numbness.   Hematological: Negative.    Psychiatric/Behavioral: Negative.         /82   Pulse 61   Ht 162.6 cm (64\")   Wt 89.4 kg (197 lb)   SpO2 96%   BMI 33.81 kg/m²     Physical Exam  Vitals " and nursing note reviewed.   Constitutional:       General: She is not in acute distress.     Appearance: Normal appearance. She is not diaphoretic.   HENT:      Head: Normocephalic. No right periorbital erythema or left periorbital erythema.      Nose: Nose normal.   Eyes:      General: No scleral icterus.     Pupils: Pupils are equal.   Cardiovascular:      Rate and Rhythm: Normal rate and regular rhythm.      Pulses: Normal pulses.      Heart sounds: Normal heart sounds. No murmur heard.  Pulmonary:      Effort: Pulmonary effort is normal. No respiratory distress.      Breath sounds: Normal breath sounds.   Abdominal:      General: Bowel sounds are normal. There is no distension.      Palpations: Abdomen is soft.      Tenderness: There is no abdominal tenderness. There is no guarding.   Musculoskeletal:         General: No swelling or tenderness. Normal range of motion.      Cervical back: Normal range of motion and neck supple.      Right lower leg: No edema.      Left lower leg: No edema.   Feet:      Right foot:      Skin integrity: Skin integrity normal.      Left foot:      Skin integrity: Skin integrity normal.   Skin:     General: Skin is warm and dry.      Findings: No erythema or rash.   Neurological:      General: No focal deficit present.      Mental Status: She is alert and oriented to person, place, and time. Mental status is at baseline.      Cranial Nerves: No cranial nerve deficit.      Gait: Gait normal.   Psychiatric:         Attention and Perception: Attention normal.         Mood and Affect: Mood normal.         Adult Stress Echo W/ Cont or Stress Agent if Necessary Per Protocol    Result Date: 4/30/2024  Narrative:   Normal stress echo with no significant echocardiographic evidence for myocardial ischemia consistent with a low risk study for myocardial ischemia.   Left ventricular systolic function is normal.     US Carotid Bilateral    Result Date: 4/30/2024  Narrative: History: Carotid  occlusive disease      Impression: Impression: 1. There is less than 50% stenosis of the right internal carotid artery. 2. There is less than 50% stenosis of the left internal carotid artery. 3. Antegrade flow is demonstrated in bilateral vertebral arteries.  Comments: Bilateral carotid vertebral arterial duplex scan was performed.  Grayscale imaging shows intimal thickening and calcified elements at the carotid bifurcation. The right internal carotid artery peak systolic velocity is 65.2 cm/sec. The end-diastolic velocity is 20.8 cm/sec. The right ICA/CCA ratio is approximately 0.7. These findings correlate with less than 50% stenosis of the right internal carotid artery.  Grayscale imaging shows intimal thickening and calcified elements at the carotid bifurcation. The left internal carotid artery peak systolic velocity is 81.7 cm/sec. The end-diastolic velocity is 37.6 cm/sec. The left ICA/CCA ratio is approximately 1.3. These findings correlate with less than 50% stenosis of the left internal carotid artery.  Antegrade flow is demonstrated in bilateral vertebral arteries.   This report was signed and finalized on 4/30/2024 3:58 PM by Dr. Elan Mathews MD.      US Ankle / Brachial Indices Extremity Complete    Result Date: 4/30/2024  Narrative:  History: PAD  Comments: Bilateral lower extremity arterial with multi-level pulse volume recordings and segmental pressures were performed at rest and stress.  The right ankle/brachial index is 1.3. The waveforms are triphasic without dampening. These findings are consistent with no significant arterial insufficiency of the right lower extremity at rest. The post exercise RONDA is 1.22.  The left ankle/brachial index is 1.17. The waveforms are triphasic without dampening. These findings are consistent with no significant arterial insufficiency of the left lower extremity at rest.    The post exercise RONDA is 1.36.      Impression: Impression: 1. No significant arterial  insufficiency of the right lower extremity at rest. 2. No significant arterial insufficiency of the left lower extremity at rest.    This report was signed and finalized on 4/30/2024 3:51 PM by Dr. Elan Mathews MD.       Patient Active Problem List   Diagnosis    Swelling of lower extremity    GERD (gastroesophageal reflux disease)    Hypothyroidism    Chest pain, atypical    History of lung cancer    S/P lobectomy of lung    Chest pain         ICD-10-CM ICD-9-CM   1. Chronic venous hypertension with inflammation involving both sides  I87.323 459.32             Plan: After thoroughly evaluating Reina Palomares, I believe the best course of action is to obtain further testing.  I will see her back in a month with noninvasive testing to include venous duplex.  She did have carotid duplex performed today which shows less than 50% bilateral carotid stenosis.  ABIs were negative for arterial insufficiency.  Patient states her pain is more of a heaviness tiredness, she does not have swelling in her ankles or distal bilateral lower extremities she just complains of swelling in her left upper thigh.  Her blood pressure is still elevated at 142/82 today in the office.  I did discuss vascular risk factors as they pertain to the progression of vascular disease including controlling hypertension.  She should follow-up with her PCP for further recommendations.  The patient can continue taking their current medication regimen as previously planned.  This was all discussed in full with complete understanding.    Thank you for allowing me to participate in the care of your patient.  Please do not hesitate with any questions or concerns.  I will keep you aware of any further encounters with Reina Palomares.        Sincerely yours,         TRACEY Li

## 2024-05-24 DIAGNOSIS — I81 PORTAL VEIN THROMBOSIS: ICD-10-CM

## 2024-05-24 NOTE — TELEPHONE ENCOUNTER
Eliquis refill-instructed patient that she needs to keep her appointment for 06/14 before provider will give any additional refills.

## 2024-06-10 ENCOUNTER — TELEPHONE (OUTPATIENT)
Dept: HEMATOLOGY | Age: 74
End: 2024-06-10

## 2024-06-10 NOTE — TELEPHONE ENCOUNTER
Called patient and reminded patient of their appointment on 6/14/2024 and patient confirmed they would be here.

## 2024-06-14 ENCOUNTER — HOSPITAL ENCOUNTER (OUTPATIENT)
Dept: INFUSION THERAPY | Age: 74
Discharge: HOME | End: 2024-06-14
Payer: MEDICARE

## 2024-06-14 ENCOUNTER — OFFICE VISIT (OUTPATIENT)
Dept: HEMATOLOGY | Age: 74
End: 2024-06-14
Payer: MEDICARE

## 2024-06-14 VITALS
WEIGHT: 200.3 LBS | BODY MASS INDEX: 34.19 KG/M2 | HEIGHT: 64 IN | DIASTOLIC BLOOD PRESSURE: 70 MMHG | TEMPERATURE: 97.9 F | HEART RATE: 77 BPM | SYSTOLIC BLOOD PRESSURE: 132 MMHG | OXYGEN SATURATION: 96 %

## 2024-06-14 DIAGNOSIS — I85.10 ESOPHAGEAL VARICES IN CIRRHOSIS (HCC): ICD-10-CM

## 2024-06-14 DIAGNOSIS — D69.6 THROMBOCYTOPENIA (HCC): ICD-10-CM

## 2024-06-14 DIAGNOSIS — I81 PORTAL VEIN THROMBOSIS: ICD-10-CM

## 2024-06-14 DIAGNOSIS — C34.12 MALIGNANT NEOPLASM OF UPPER LOBE OF LEFT LUNG (HCC): Primary | ICD-10-CM

## 2024-06-14 DIAGNOSIS — R16.1 SPLENOMEGALY: ICD-10-CM

## 2024-06-14 DIAGNOSIS — K74.60 CIRRHOSIS OF LIVER WITHOUT ASCITES, UNSPECIFIED HEPATIC CIRRHOSIS TYPE (HCC): ICD-10-CM

## 2024-06-14 DIAGNOSIS — K74.60 ESOPHAGEAL VARICES IN CIRRHOSIS (HCC): ICD-10-CM

## 2024-06-14 LAB
AFP SERPL-MCNC: 4 NG/ML (ref 0–8.3)
ALBUMIN SERPL-MCNC: 4.2 G/DL (ref 3.5–5.2)
ALP SERPL-CCNC: 100 U/L (ref 35–104)
ALT SERPL-CCNC: 59 U/L (ref 5–33)
ANION GAP SERPL CALCULATED.3IONS-SCNC: 12 MMOL/L (ref 7–19)
AST SERPL-CCNC: 82 U/L (ref 5–32)
BASOPHILS # BLD: 0.05 K/UL (ref 0.01–0.08)
BASOPHILS NFR BLD: 1.2 % (ref 0.1–1.2)
BILIRUB SERPL-MCNC: 0.7 MG/DL (ref 0.2–1.2)
BUN SERPL-MCNC: 10 MG/DL (ref 8–23)
CALCIUM SERPL-MCNC: 9.3 MG/DL (ref 8.8–10.2)
CHLORIDE SERPL-SCNC: 102 MMOL/L (ref 98–111)
CO2 SERPL-SCNC: 28 MMOL/L (ref 22–29)
CREAT SERPL-MCNC: 0.5 MG/DL (ref 0.5–0.9)
EOSINOPHIL # BLD: 0.14 K/UL (ref 0.04–0.54)
EOSINOPHIL NFR BLD: 3.4 % (ref 0.7–7)
ERYTHROCYTE [DISTWIDTH] IN BLOOD BY AUTOMATED COUNT: 13.1 % (ref 11.7–14.4)
GLUCOSE SERPL-MCNC: 125 MG/DL (ref 74–109)
HCT VFR BLD AUTO: 38.8 % (ref 34.1–44.9)
HGB BLD-MCNC: 12.8 G/DL (ref 11.2–15.7)
LYMPHOCYTES # BLD: 1.14 K/UL (ref 1.18–3.74)
LYMPHOCYTES NFR BLD: 27.7 % (ref 19.3–53.1)
MCH RBC QN AUTO: 32.2 PG (ref 25.6–32.2)
MCHC RBC AUTO-ENTMCNC: 33 G/DL (ref 32.3–35.5)
MCV RBC AUTO: 97.7 FL (ref 79.4–94.8)
MONOCYTES # BLD: 0.5 K/UL (ref 0.24–0.82)
MONOCYTES NFR BLD: 12.1 % (ref 4.7–12.5)
NEUTROPHILS # BLD: 2.28 K/UL (ref 1.56–6.13)
NEUTS SEG NFR BLD: 55.4 % (ref 34–71.1)
PLATELET # BLD AUTO: 93 K/UL (ref 182–369)
PMV BLD AUTO: 10.5 FL (ref 7.4–10.4)
POTASSIUM SERPL-SCNC: 4.2 MMOL/L (ref 3.5–5)
PROT SERPL-MCNC: 7.7 G/DL (ref 6.6–8.7)
RBC # BLD AUTO: 3.97 M/UL (ref 3.93–5.22)
SODIUM SERPL-SCNC: 142 MMOL/L (ref 136–145)
WBC # BLD AUTO: 4.12 K/UL (ref 3.98–10.04)

## 2024-06-14 PROCEDURE — 1036F TOBACCO NON-USER: CPT | Performed by: NURSE PRACTITIONER

## 2024-06-14 PROCEDURE — 3017F COLORECTAL CA SCREEN DOC REV: CPT | Performed by: NURSE PRACTITIONER

## 2024-06-14 PROCEDURE — G8400 PT W/DXA NO RESULTS DOC: HCPCS | Performed by: NURSE PRACTITIONER

## 2024-06-14 PROCEDURE — 99212 OFFICE O/P EST SF 10 MIN: CPT

## 2024-06-14 PROCEDURE — 1123F ACP DISCUSS/DSCN MKR DOCD: CPT | Performed by: NURSE PRACTITIONER

## 2024-06-14 PROCEDURE — G8428 CUR MEDS NOT DOCUMENT: HCPCS | Performed by: NURSE PRACTITIONER

## 2024-06-14 PROCEDURE — G8417 CALC BMI ABV UP PARAM F/U: HCPCS | Performed by: NURSE PRACTITIONER

## 2024-06-14 PROCEDURE — 99214 OFFICE O/P EST MOD 30 MIN: CPT | Performed by: NURSE PRACTITIONER

## 2024-06-14 PROCEDURE — 85025 COMPLETE CBC W/AUTO DIFF WBC: CPT

## 2024-06-14 PROCEDURE — 1090F PRES/ABSN URINE INCON ASSESS: CPT | Performed by: NURSE PRACTITIONER

## 2024-06-14 PROCEDURE — 36415 COLL VENOUS BLD VENIPUNCTURE: CPT | Performed by: NURSE PRACTITIONER

## 2024-06-14 PROCEDURE — 36415 COLL VENOUS BLD VENIPUNCTURE: CPT

## 2024-06-14 RX ORDER — TROSPIUM CHLORIDE 20 MG/1
20 TABLET, FILM COATED ORAL DAILY
COMMUNITY

## 2024-06-17 ASSESSMENT — ENCOUNTER SYMPTOMS
NAUSEA: 0
VOMITING: 0
BACK PAIN: 0
WHEEZING: 0
CONSTIPATION: 0
SORE THROAT: 0
DIARRHEA: 0
SHORTNESS OF BREATH: 1
EYE PAIN: 0
GASTROINTESTINAL NEGATIVE: 1
EYE REDNESS: 0
EYE DISCHARGE: 0
EYES NEGATIVE: 1
BLOOD IN STOOL: 0
ABDOMINAL PAIN: 0
COUGH: 0

## 2024-06-22 DIAGNOSIS — I81 PORTAL VEIN THROMBOSIS: ICD-10-CM

## 2024-06-24 RX ORDER — APIXABAN 2.5 MG/1
2.5 TABLET, FILM COATED ORAL 2 TIMES DAILY
Qty: 180 TABLET | Refills: 0 | Status: SHIPPED | OUTPATIENT
Start: 2024-06-24

## 2024-06-28 ENCOUNTER — TELEPHONE (OUTPATIENT)
Dept: VASCULAR SURGERY | Facility: CLINIC | Age: 74
End: 2024-06-28
Payer: MEDICARE

## 2024-06-28 NOTE — TELEPHONE ENCOUNTER
Spoke with patient and reminded of testing and appointment  on 7/1/2024 and she has verbalized understanding.

## 2024-07-01 ENCOUNTER — HOSPITAL ENCOUNTER (OUTPATIENT)
Dept: ULTRASOUND IMAGING | Facility: HOSPITAL | Age: 74
Discharge: HOME | End: 2024-07-01
Admitting: NURSE PRACTITIONER
Payer: MEDICARE

## 2024-07-01 ENCOUNTER — OFFICE VISIT (OUTPATIENT)
Dept: VASCULAR SURGERY | Facility: CLINIC | Age: 74
End: 2024-07-01
Payer: MEDICARE

## 2024-07-01 VITALS
HEIGHT: 64 IN | OXYGEN SATURATION: 96 % | HEART RATE: 67 BPM | SYSTOLIC BLOOD PRESSURE: 136 MMHG | WEIGHT: 195 LBS | BODY MASS INDEX: 33.29 KG/M2 | DIASTOLIC BLOOD PRESSURE: 74 MMHG

## 2024-07-01 DIAGNOSIS — I87.323 CHRONIC VENOUS HYPERTENSION WITH INFLAMMATION INVOLVING BOTH SIDES: Primary | ICD-10-CM

## 2024-07-01 DIAGNOSIS — I87.323 CHRONIC VENOUS HYPERTENSION WITH INFLAMMATION INVOLVING BOTH SIDES: ICD-10-CM

## 2024-07-01 DIAGNOSIS — M79.89 SWELLING OF LOWER EXTREMITY: ICD-10-CM

## 2024-07-01 DIAGNOSIS — Z01.818 PREOP TESTING: ICD-10-CM

## 2024-07-01 PROCEDURE — 93970 EXTREMITY STUDY: CPT | Performed by: SURGERY

## 2024-07-01 PROCEDURE — 93970 EXTREMITY STUDY: CPT

## 2024-07-01 PROCEDURE — 99213 OFFICE O/P EST LOW 20 MIN: CPT | Performed by: NURSE PRACTITIONER

## 2024-07-01 PROCEDURE — 1159F MED LIST DOCD IN RCRD: CPT | Performed by: NURSE PRACTITIONER

## 2024-07-01 PROCEDURE — 1160F RVW MEDS BY RX/DR IN RCRD: CPT | Performed by: NURSE PRACTITIONER

## 2024-07-01 NOTE — PROGRESS NOTES
7/1/2024        Carrie Darby, DO  [address] [place] [id number]      Reina Palomares  1950    Chief Complaint   Patient presents with    Follow-up     1 month follow up w/ testing. Last seen 5/22/24. Patient complains of 'blood spots' on left lower leg.        Dear Dr. Carrie Darby:    I had the pleasure of seeing your patient Reina Palomares in the office today. As you recall, Reina Palomares is a 73 y.o.  female who you are currently following for routine health maintenance.  She is here today for 1 month follow-up with testing.  She had complaints of swelling to her lower extremities.  She states that her left upper thigh swells bigger than her right.  She complains of a discomfort/heaviness while she is up walking, even the simplest tasks such as vacuuming she has to stop and rest.  She also complains of discomfort in her calves and her shins.  Left is worse than her right.  She has tried compression stockings previously, with no relief.  She has a history of hepatitis C and is followed by a doctor in [place] for liver varices.  She also has a history of lung cancer and has had her left lung removed.  She is maintained on Eliquis.  She complains of blood spots appearing, I did explain to her that this can happen with the use of Eliquis.  She did have noninvasive testing performed today, which I did review in office.      Review of Systems   Constitutional: Negative.  Negative for diaphoresis and fever.   HENT: Negative.     Eyes: Negative.    Respiratory:  Positive for shortness of breath. Negative for wheezing.    Cardiovascular:  Positive for leg swelling.   Gastrointestinal: Negative.  Negative for abdominal pain.   Endocrine: Negative.    Genitourinary: Negative.    Musculoskeletal: Negative.  Negative for gait problem.   Skin:  Positive for color change.   Allergic/Immunologic: Negative.    Neurological: Negative.  Negative for dizziness and numbness.   Hematological: Negative.   "  Psychiatric/Behavioral: Negative.       /74   Pulse 67   Ht 162.6 cm (64\")   Wt 88.5 kg (195 lb)   SpO2 96%   BMI 33.47 kg/m²     Physical Exam  Vitals and nursing note reviewed.   Constitutional:       General: She is not in acute distress.     Appearance: Normal appearance. She is not diaphoretic.   HENT:      Head: Normocephalic. No right periorbital erythema or left periorbital erythema.      Nose: Nose normal.   Eyes:      General: No scleral icterus.     Pupils: Pupils are equal.   Cardiovascular:      Rate and Rhythm: Normal rate and regular rhythm.      Heart sounds: Normal heart sounds. No murmur heard.  Pulmonary:      Effort: Pulmonary effort is normal. No respiratory distress.      Breath sounds: Normal breath sounds.   Abdominal:      General: Bowel sounds are normal. There is no distension.      Palpations: Abdomen is soft.      Tenderness: There is no abdominal tenderness. There is no guarding.   Musculoskeletal:         General: Swelling present. No tenderness. Normal range of motion.      Cervical back: Normal range of motion and neck supple.      Right lower leg: No edema.      Left lower leg: No edema.   Feet:      Right foot:      Skin integrity: Skin integrity normal.      Left foot:      Skin integrity: Skin integrity normal.   Skin:     General: Skin is warm and dry.      Findings: Ecchymosis present. No erythema or rash.      Comments: On bilateral lower extremities and bilateral upper extremities   Neurological:      General: No focal deficit present.      Mental Status: She is alert and oriented to person, place, and time. Mental status is at baseline.      Cranial Nerves: No cranial nerve deficit.      Gait: Gait normal.   Psychiatric:         Attention and Perception: Attention normal.         Mood and Affect: Mood normal.     DIAGNOSTIC DATA        US Venous Doppler Lower Extremity Bilateral (duplex)    Result Date: 7/1/2024  Narrative: History: Swelling  Comments: Venous " valvular insufficiency testing was performed in the bilateral lower extremities using duplex ultrasound with compression techniques.  The common femoral vein, superficial femoral, popliteal vein, posterior tibial vein, peroneal vein, greater saphenous vein, and lesser saphenous veins were interrogated.  On the right, the greater saphenous vein at the junction measured 9.1 mm. In the mid thigh measured 4.4 mm. Above the knee measured 3 mm. In the mid calf measured 2.4 mm. At the ankle measured 2.8 mm. There is greater than 0.5 seconds of reflux at the junction. The lesser saphenous vein is within normal limits and no evidence of reflux. There is no evidence of DVT.  On the left, the greater saphenous vein at the junction measured 5.3 mm. In the mid thigh measured 4.6 mm. Above the knee measured 2.8 mm. In the mid calf measured 2.0 mm. At the ankle measured 2.2 mm. There is greater than 0.5 seconds of reflux from the junction to the mid calf. The lesser saphenous vein is within normal limits and no evidence of reflux. There is no evidence of DVT.      Impression: Impression: There is evidence of venous insufficiency in both lower extremity greater saphenous veins.    This report was signed and finalized on 7/1/2024 1:28 PM by Dr. Elan Mathwes MD.       Patient Active Problem List   Diagnosis    Swelling of lower extremity    GERD (gastroesophageal reflux disease)    Hypothyroidism    Chest pain, atypical    History of lung cancer    S/P lobectomy of lung    Chest pain         ICD-10-CM ICD-9-CM   1. Chronic venous hypertension with inflammation involving both sides  I87.323 459.32   2. Swelling of lower extremity  M79.89 729.81               Plan: After thoroughly evaluating Reina Palomares, I believe the best course of action is to proceed with left lower extremity radiofrequency ablation.  After reviewing her venous duplex, she does have venous insufficiency to both lower extremities, she is having more issues with  her left lower extremity.  Risks of radiofrequency ablation include, but are not limited to, bleeding, infection, vessel damage, nerve damage, DVT, phlebitis, and pulmonary embolus.  The patient understands these risks and wishes to proceed with procedure.  She complains of heaviness and tiredness, swelling in her left upper thigh.  Her blood pressure is stable at 136/74.  She complains of bruising to her bilateral lower extremities and upper extremities due to the Eliquis use.  I did explain to her that this was common.  She also states that she researched it and propranolol also causes this.  I did discuss vascular risk factors as they pertain to the progression of vascular disease including controlling hypertension.  She did state that she is supposed to be having a bone marrow biopsy, but she is not sure if she is going to do it.  Jessica Glass stated it is okay to hold Eliquis for 2 days prior to surgery.  The patient can continue taking their current medication regimen as previously planned.  This was all discussed in full with complete understanding.    Thank you for allowing me to participate in the care of your patient.  Please do not hesitate with any questions or concerns.  I will keep you aware of any further encounters with Reina Palomares.        Sincerely yours,         TRACEY Li

## 2024-07-03 ENCOUNTER — CLINICAL DOCUMENTATION (OUTPATIENT)
Dept: INFUSION THERAPY | Age: 74
End: 2024-07-03

## 2024-07-03 NOTE — PROGRESS NOTES
Rishi from Dr. Morgan office Vascular Surgery at [facility] pt is having a radio frequency ablation done on L leg and wanted to know if she could be off Eliquis for 2 days or needed a bridge. Phone number is [phone].  Forwarded message to Trevor Campbell RN

## 2024-07-18 ENCOUNTER — TELEPHONE (OUTPATIENT)
Dept: VASCULAR SURGERY | Facility: CLINIC | Age: 74
End: 2024-07-18
Payer: MEDICARE

## 2024-07-18 NOTE — TELEPHONE ENCOUNTER
Attempted to schedule procedure with Dr. Mathews.  Pt stated son is going to the [facility] and will be gone for 3 weeks.  Will need to contact patient back once son comes home and gets schedule.

## 2024-08-02 ENCOUNTER — TELEPHONE (OUTPATIENT)
Dept: HEMATOLOGY | Age: 74
End: 2024-08-02

## 2024-08-02 NOTE — TELEPHONE ENCOUNTER
Called Patient and reminded patient of their appointment on 08/06/2024 and patient confirmed they would be here. Reminded patient to just come at appointment time, and to not come at the lab appointment time. Reminded patient that we will not check them in any more than 30 minutes before appointment time.  We have now moved to the [facility] that is located between our old office and the ER at the [facility]

## 2024-08-05 DIAGNOSIS — K74.60 CIRRHOSIS OF LIVER WITHOUT ASCITES, UNSPECIFIED HEPATIC CIRRHOSIS TYPE (HCC): Primary | ICD-10-CM

## 2024-08-06 ENCOUNTER — OFFICE VISIT (OUTPATIENT)
Dept: HEMATOLOGY | Age: 74
End: 2024-08-06

## 2024-08-06 ENCOUNTER — HOSPITAL ENCOUNTER (OUTPATIENT)
Dept: INFUSION THERAPY | Age: 74
Discharge: HOME | End: 2024-08-06
Payer: MEDICARE

## 2024-08-06 VITALS
HEIGHT: 64 IN | TEMPERATURE: 98.1 F | SYSTOLIC BLOOD PRESSURE: 140 MMHG | WEIGHT: 201.5 LBS | DIASTOLIC BLOOD PRESSURE: 70 MMHG | OXYGEN SATURATION: 95 % | BODY MASS INDEX: 34.4 KG/M2 | HEART RATE: 63 BPM

## 2024-08-06 DIAGNOSIS — D69.6 THROMBOCYTOPENIA (HCC): ICD-10-CM

## 2024-08-06 DIAGNOSIS — K74.60 CIRRHOSIS OF LIVER WITHOUT ASCITES, UNSPECIFIED HEPATIC CIRRHOSIS TYPE (HCC): ICD-10-CM

## 2024-08-06 DIAGNOSIS — C34.12 MALIGNANT NEOPLASM OF UPPER LOBE OF LEFT LUNG (HCC): ICD-10-CM

## 2024-08-06 DIAGNOSIS — Z12.31 SCREENING MAMMOGRAM FOR BREAST CANCER: Primary | ICD-10-CM

## 2024-08-06 DIAGNOSIS — I81 PORTAL VEIN THROMBOSIS: ICD-10-CM

## 2024-08-06 DIAGNOSIS — R16.1 SPLENOMEGALY: ICD-10-CM

## 2024-08-06 LAB
ALBUMIN SERPL-MCNC: 4 G/DL (ref 3.5–5.2)
ALP SERPL-CCNC: 72 U/L (ref 35–104)
ALT SERPL-CCNC: 40 U/L (ref 5–33)
ANION GAP SERPL CALCULATED.3IONS-SCNC: 13 MMOL/L (ref 7–19)
AST SERPL-CCNC: 58 U/L (ref 5–32)
BASOPHILS # BLD: 0.05 K/UL (ref 0.01–0.08)
BASOPHILS NFR BLD: 1.1 % (ref 0.1–1.2)
BILIRUB SERPL-MCNC: 0.8 MG/DL (ref 0–1.2)
BUN SERPL-MCNC: 11 MG/DL (ref 8–23)
CALCIUM SERPL-MCNC: 8.5 MG/DL (ref 8.8–10.2)
CHLORIDE SERPL-SCNC: 100 MMOL/L (ref 98–107)
CO2 SERPL-SCNC: 27 MMOL/L (ref 22–29)
CREAT SERPL-MCNC: 0.7 MG/DL (ref 0.5–0.9)
EOSINOPHIL # BLD: 0.22 K/UL (ref 0.04–0.54)
EOSINOPHIL NFR BLD: 5 % (ref 0.7–7)
ERYTHROCYTE [DISTWIDTH] IN BLOOD BY AUTOMATED COUNT: 13.2 % (ref 11.7–14.4)
GLUCOSE SERPL-MCNC: 119 MG/DL (ref 70–99)
HCT VFR BLD AUTO: 36.2 % (ref 34.1–44.9)
HGB BLD-MCNC: 12.1 G/DL (ref 11.2–15.7)
LYMPHOCYTES # BLD: 1.2 K/UL (ref 1.18–3.74)
LYMPHOCYTES NFR BLD: 27 % (ref 19.3–53.1)
MCH RBC QN AUTO: 32.1 PG (ref 25.6–32.2)
MCHC RBC AUTO-ENTMCNC: 33.4 G/DL (ref 32.3–35.5)
MCV RBC AUTO: 96 FL (ref 79.4–94.8)
MONOCYTES # BLD: 0.49 K/UL (ref 0.24–0.82)
MONOCYTES NFR BLD: 11 % (ref 4.7–12.5)
NEUTROPHILS # BLD: 2.46 K/UL (ref 1.56–6.13)
NEUTS SEG NFR BLD: 55.4 % (ref 34–71.1)
PLATELET # BLD AUTO: 107 K/UL (ref 182–369)
PMV BLD AUTO: 10.6 FL (ref 7.4–10.4)
POTASSIUM SERPL-SCNC: 4 MMOL/L (ref 3.5–5.1)
PROT SERPL-MCNC: 7.4 G/DL (ref 6.4–8.3)
RBC # BLD AUTO: 3.77 M/UL (ref 3.93–5.22)
SODIUM SERPL-SCNC: 140 MMOL/L (ref 136–145)
WBC # BLD AUTO: 4.44 K/UL (ref 3.98–10.04)

## 2024-08-06 PROCEDURE — 80053 COMPREHEN METABOLIC PANEL: CPT

## 2024-08-06 PROCEDURE — 85025 COMPLETE CBC W/AUTO DIFF WBC: CPT

## 2024-08-06 PROCEDURE — 99212 OFFICE O/P EST SF 10 MIN: CPT

## 2024-08-06 PROCEDURE — 36415 COLL VENOUS BLD VENIPUNCTURE: CPT

## 2024-08-06 NOTE — PROGRESS NOTES
Progress Note      Pt Name: Ivelisse Renteria  YOB: 1950  MRN: [id number]    Date of evaluation: 08/06/2024  History Obtained From:  patient, electronic medical record    CHIEF COMPLAINT:    Chief Complaint   Patient presents with    Follow-up     Malignant neoplasm of upper lobe of left lung     HISTORY OF PRESENT ILLNESS:    Ivelisse Renteria is a 74 y.o.  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  She has no known radiographic imaging to suggest progression of carcinoma and is currently anticoagulated with Eliquis 5 mg p.o. twice daily.  Ivelisse returns today in scheduled follow-up for evaluation, lab monitoring, and further treatment recommendations.  She presents today with no new complaints.    Today's clinic visit to include physical assessment, review of systems, any radiograph or lab findings that were available and plan of care are documented below.     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 Grey     Ivelisse Renteria was first seen by Dr. Aguirre 1/26/2021.  She was referred by her

## 2024-08-09 ASSESSMENT — ENCOUNTER SYMPTOMS
RESPIRATORY NEGATIVE: 1
CONSTIPATION: 0
COUGH: 0
NAUSEA: 0
BLOOD IN STOOL: 0
BACK PAIN: 0
DIARRHEA: 0
SORE THROAT: 0
WHEEZING: 0
EYE REDNESS: 0
EYES NEGATIVE: 1
SHORTNESS OF BREATH: 0
VOMITING: 0
ABDOMINAL PAIN: 0
GASTROINTESTINAL NEGATIVE: 1
EYE PAIN: 0
EYE DISCHARGE: 0

## 2024-08-19 ENCOUNTER — TELEPHONE (OUTPATIENT)
Dept: VASCULAR SURGERY | Facility: CLINIC | Age: 74
End: 2024-08-19
Payer: MEDICARE

## 2024-08-21 ENCOUNTER — TELEPHONE (OUTPATIENT)
Dept: VASCULAR SURGERY | Facility: CLINIC | Age: 74
End: 2024-08-21
Payer: MEDICARE

## 2024-08-21 PROBLEM — Z01.818 PREOP TESTING: Status: ACTIVE | Noted: 2024-07-01

## 2024-08-21 PROBLEM — I87.323 CHRONIC VENOUS HYPERTENSION WITH INFLAMMATION INVOLVING BOTH SIDES: Status: ACTIVE | Noted: 2024-07-01

## 2024-08-21 NOTE — TELEPHONE ENCOUNTER
Pt expressed understanding of prework/surgery time and instruction for procedure scheduled 09/06/24 with Dr. Mathews.  NPO after midnight.  Pt to hold Eliquis 2 days prior to procedure.

## 2024-09-03 ENCOUNTER — PRE-ADMISSION TESTING (OUTPATIENT)
Dept: PREADMISSION TESTING | Facility: HOSPITAL | Age: 74
End: 2024-09-03
Payer: MEDICARE

## 2024-09-03 VITALS
DIASTOLIC BLOOD PRESSURE: 76 MMHG | HEART RATE: 88 BPM | SYSTOLIC BLOOD PRESSURE: 161 MMHG | OXYGEN SATURATION: 95 % | RESPIRATION RATE: 20 BRPM | BODY MASS INDEX: 34.14 KG/M2 | HEIGHT: 64 IN | WEIGHT: 199.96 LBS

## 2024-09-03 DIAGNOSIS — I87.323 CHRONIC VENOUS HYPERTENSION WITH INFLAMMATION INVOLVING BOTH SIDES: ICD-10-CM

## 2024-09-03 DIAGNOSIS — Z01.818 PREOP TESTING: ICD-10-CM

## 2024-09-03 PROCEDURE — 93005 ELECTROCARDIOGRAM TRACING: CPT

## 2024-09-03 RX ORDER — TIOTROPIUM BROMIDE AND OLODATEROL 3.124; 2.736 UG/1; UG/1
2 SPRAY, METERED RESPIRATORY (INHALATION) 2 TIMES DAILY PRN
COMMUNITY

## 2024-09-03 NOTE — DISCHARGE INSTRUCTIONS
Preparing for Surgery  Follow these instructions before the procedure:  Several days or weeks before your procedure  Medication(s) you need to stop   _______ days/week prior to surgery: ***      Ask your health care provider about:  Changing or stopping your regular medicines. This is especially important if you are taking diabetes medicines or blood thinners.  Taking medicines such as aspirin and ibuprofen. These medicines can thin your blood. Do not take these medicines unless your health care provider tells you to take them.  Taking over-the-counter medicines, vitamins, herbs, and supplements.    Contact your surgeon if you:  Develop a fever of more than 100.4°F (38°C) or other feelings of illness during the 48 hours before your surgery.  Have symptoms that get worse.  Have questions or concerns about your surgery.  If you are going home the same day of your surgery you will need to arrange for a responsible adult, age 18 years old or older, to drive you home from the hospital and stay with you for 24 hours. Verification of the  will be made prior to any procedure requiring sedation. You may not go home in a taxi or any form of public transportation by yourself.     Day before your procedure  Medication(s) you need to stop the day before your surgery:***    24 hours before your procedure DO NOT drink alcoholic beverages or smoke.  24 hours before your procedure STOP taking Erectile Dysfunction medication (i.e.,Cialis, Viagra)   You may be asked to shower with a germ-killing soap.  Day of your procedure   You may take the following medication(s) the morning of surgery with a sip of water: ***      8 hours before your scheduled arrival time, STOP all food, any dairy products, and full liquids. This includes hard candy, chewing gum or mints. This is extremely important to prevent serious complications.     Up to 2 hours before your scheduled arrival time, you may have clear liquids no cream, powder, or pulp of  any kind. Safe options are water, black coffee, plain tea, soda, Gatorade/Powerade, clear broth, apple juice.    2 hours before your scheduled arrival time, STOP drinking clear liquids.    You may need to take another shower with a germ-killing soap before you leave home in the morning. Do not use perfumes, colognes, or body lotions.  Wear comfortable loose-fitting clothing.  Remove all jewelry including body piercing and rings, dark colored nail polish, and make up prior to arrival at the hospital. Leave all valuables at home.   Bring your hearing aids if you rely on them.  Do not wear contact lenses. If you wear eyeglasses remember to bring a case to store them in while you are in surgery.  Do not use denture adhesives since you will be asked to remove them during your surgery.    You do not need to bring your home medications into the hospital.   Bring your sleep apnea device with you on the day of your surgery (if this applies to you).  If you wear portable oxygen, bring it with you.   If you are staying overnight, you may bring a bag of items you may need such as slippers, robe and a change of clothes for your discharge. You may want to leave these items in the car until you are ready for them since your family will take your belongings when you leave the pre-operative area.  Arrive at the hospital as scheduled by the office. You will be asked to arrive 2 hours prior to your surgery time in order to prepare for your procedure.  When you arrive at the hospital  Go to the registration desk located at the main entrance of the hospital.  After registration is completed, you will be given a beeper and a sticker sheet. Take the stickers to Outpatient Surgery and place in the tray at the end of the desk to notify the staff that you have arrived and registered.   Return to the lobby to wait. You are not always called back according to the time of arrival but rather the time your doctor will be ready.  When your beeper  lights up and vibrates proceed through the double doors, under the stairs, and a member of the Outpatient Surgery staff will escort you to your preoperative room.   How to Use Chlorhexidine Before Surgery  Chlorhexidine gluconate (CHG) is a germ-killing (antiseptic) solution that is used to clean the skin. It can get rid of the bacteria that normally live on the skin and can keep them away for about 24 hours. To clean your skin with CHG, you may be given:  A CHG solution to use in the shower or as part of a sponge bath.  A prepackaged cloth that contains CHG.  Cleaning your skin with CHG may help lower the risk for infection:  While you are staying in the intensive care unit of the hospital.  If you have a vascular access, such as a central line, to provide short-term or long-term access to your veins.  If you have a catheter to drain urine from your bladder.  If you are on a ventilator. A ventilator is a machine that helps you breathe by moving air in and out of your lungs.  After surgery.  What are the risks?  Risks of using CHG include:  A skin reaction.  Hearing loss, if CHG gets in your ears and you have a perforated eardrum.  Eye injury, if CHG gets in your eyes and is not rinsed out.  The CHG product catching fire.  Make sure that you avoid smoking and flames after applying CHG to your skin.  Do not use CHG:  If you have a chlorhexidine allergy or have previously reacted to chlorhexidine.  On babies younger than 2 months of age.  How to use CHG solution  Use CHG only as told by your health care provider, and follow the instructions on the label.  Use the full amount of CHG as directed. Usually, this is one bottle.  During a shower    Follow these steps when using CHG solution during a shower (unless your health care provider gives you different instructions):  Start the shower.  Use your normal soap and shampoo to wash your face and hair.  Turn off the shower or move out of the shower stream.  Pour the CHG  onto a clean washcloth. Do not use any type of brush or rough-edged sponge.  Starting at your neck, lather your body down to your toes. Make sure you follow these instructions:  If you will be having surgery, pay special attention to the part of your body where you will be having surgery. Scrub this area for at least 1 minute.  Do not use CHG on your head or face. If the solution gets into your ears or eyes, rinse them well with water.  Avoid your genital area.  Avoid any areas of skin that have broken skin, cuts, or scrapes.  Scrub your back and under your arms. Make sure to wash skin folds.  Let the lather sit on your skin for 1-2 minutes or as long as told by your health care provider.  Thoroughly rinse your entire body in the shower. Make sure that all body creases and crevices are rinsed well.  Dry off with a clean towel. Do not put any substances on your body afterward--such as powder, lotion, or perfume--unless you are told to do so by your health care provider. Only use lotions that are recommended by the .  Put on clean clothes or pajamas.  If it is the night before your surgery, sleep in clean sheets.     During a sponge bath  Follow these steps when using CHG solution during a sponge bath (unless your health care provider gives you different instructions):  Use your normal soap and shampoo to wash your face and hair.  Pour the CHG onto a clean washcloth.  Starting at your neck, lather your body down to your toes. Make sure you follow these instructions:  If you will be having surgery, pay special attention to the part of your body where you will be having surgery. Scrub this area for at least 1 minute.  Do not use CHG on your head or face. If the solution gets into your ears or eyes, rinse them well with water.  Avoid your genital area.  Avoid any areas of skin that have broken skin, cuts, or scrapes.  Scrub your back and under your arms. Make sure to wash skin folds.  Let the lather sit on  your skin for 1-2 minutes or as long as told by your health care provider.  Using a different clean, wet washcloth, thoroughly rinse your entire body. Make sure that all body creases and crevices are rinsed well.  Dry off with a clean towel. Do not put any substances on your body afterward--such as powder, lotion, or perfume--unless you are told to do so by your health care provider. Only use lotions that are recommended by the .  Put on clean clothes or pajamas.  If it is the night before your surgery, sleep in clean sheets.  How to use CHG prepackaged cloths  Only use CHG cloths as told by your health care provider, and follow the instructions on the label.  Use the CHG cloth on clean, dry skin.  Do not use the CHG cloth on your head or face unless your health care provider tells you to.  When washing with the CHG cloth:  Avoid your genital area.  Avoid any areas of skin that have broken skin, cuts, or scrapes.  Before surgery    Follow these steps when using a CHG cloth to clean before surgery (unless your health care provider gives you different instructions):  Using the CHG cloth, vigorously scrub the part of your body where you will be having surgery. Scrub using a back-and-forth motion for 3 minutes. The area on your body should be completely wet with CHG when you are done scrubbing.  Do not rinse. Discard the cloth and let the area air-dry. Do not put any substances on the area afterward, such as powder, lotion, or perfume.  Put on clean clothes or pajamas.  If it is the night before your surgery, sleep in clean sheets.     For general bathing  Follow these steps when using CHG cloths for general bathing (unless your health care provider gives you different instructions).  Use a separate CHG cloth for each area of your body. Make sure you wash between any folds of skin and between your fingers and toes. Wash your body in the following order, switching to a new cloth after each step:  The front of  your neck, shoulders, and chest.  Both of your arms, under your arms, and your hands.  Your stomach and groin area, avoiding the genitals.  Your right leg and foot.  Your left leg and foot.  The back of your neck, your back, and your buttocks.  Do not rinse. Discard the cloth and let the area air-dry. Do not put any substances on your body afterward--such as powder, lotion, or perfume--unless you are told to do so by your health care provider. Only use lotions that are recommended by the .  Put on clean clothes or pajamas.  Contact a health care provider if:  Your skin gets irritated after scrubbing.  You have questions about using your solution or cloth.  You swallow any chlorhexidine. Call your local poison control center (1-768.640.5250 in the [place]  Get help right away if:  Your eyes itch badly, or they become very red or swollen.  Your skin itches badly and is red or swollen.  Your hearing changes.  You have trouble seeing.  You have swelling or tingling in your mouth or throat.  You have trouble breathing.  These symptoms may represent a serious problem that is an emergency. Do not wait to see if the symptoms will go away. Get medical help right away. Call your local emergency services (182 in the [place] Do not drive yourself to the hospital.  Summary  Chlorhexidine gluconate (CHG) is a germ-killing (antiseptic) solution that is used to clean the skin. Cleaning your skin with CHG may help to lower your risk for infection.  You may be given CHG to use for bathing. It may be in a bottle or in a prepackaged cloth to use on your skin. Carefully follow your health care provider's instructions and the instructions on the product label.  Do not use CHG if you have a chlorhexidine allergy.  Contact your health care provider if your skin gets irritated after scrubbing.  This information is not intended to replace advice given to you by your health care provider. Make sure you discuss any questions you have  with your health care provider.  Document Revised: 04/17/2023 Document Reviewed: 02/28/2022  Elsevier Patient Education © 2023 [employer]

## 2024-09-05 ENCOUNTER — TELEPHONE (OUTPATIENT)
Dept: VASCULAR SURGERY | Facility: CLINIC | Age: 74
End: 2024-09-05
Payer: MEDICARE

## 2024-09-05 LAB
QT INTERVAL: 446 MS
QTC INTERVAL: 498 MS

## 2024-09-06 ENCOUNTER — ANESTHESIA EVENT (OUTPATIENT)
Dept: PERIOP | Facility: HOSPITAL | Age: 74
End: 2024-09-06
Payer: MEDICARE

## 2024-09-06 ENCOUNTER — APPOINTMENT (OUTPATIENT)
Dept: ULTRASOUND IMAGING | Facility: HOSPITAL | Age: 74
End: 2024-09-06
Payer: MEDICARE

## 2024-09-06 ENCOUNTER — HOSPITAL ENCOUNTER (OUTPATIENT)
Facility: HOSPITAL | Age: 74
Setting detail: HOSPITAL OUTPATIENT SURGERY
Discharge: HOME | End: 2024-09-06
Attending: SURGERY | Admitting: SURGERY
Payer: MEDICARE

## 2024-09-06 ENCOUNTER — ANESTHESIA (OUTPATIENT)
Dept: PERIOP | Facility: HOSPITAL | Age: 74
End: 2024-09-06
Payer: MEDICARE

## 2024-09-06 VITALS
DIASTOLIC BLOOD PRESSURE: 85 MMHG | SYSTOLIC BLOOD PRESSURE: 142 MMHG | RESPIRATION RATE: 16 BRPM | OXYGEN SATURATION: 97 % | TEMPERATURE: 97.2 F | HEART RATE: 78 BPM

## 2024-09-06 DIAGNOSIS — I87.323 CHRONIC VENOUS HYPERTENSION WITH INFLAMMATION INVOLVING BOTH SIDES: ICD-10-CM

## 2024-09-06 DIAGNOSIS — Z01.818 PREOP TESTING: ICD-10-CM

## 2024-09-06 LAB
APTT PPP: 31.6 SECONDS (ref 24.5–36)
INR PPP: 1.14 (ref 0.91–1.09)
PROTHROMBIN TIME: 15.1 SECONDS (ref 11.8–14.8)

## 2024-09-06 PROCEDURE — 25810000003 LACTATED RINGERS PER 1000 ML: Performed by: SURGERY

## 2024-09-06 PROCEDURE — 25010000002 FENTANYL CITRATE (PF) 100 MCG/2ML SOLUTION: Performed by: NURSE ANESTHETIST, CERTIFIED REGISTERED

## 2024-09-06 PROCEDURE — 85730 THROMBOPLASTIN TIME PARTIAL: CPT | Performed by: SURGERY

## 2024-09-06 PROCEDURE — 25010000002 PROPOFOL 1000 MG/100ML EMULSION: Performed by: NURSE ANESTHETIST, CERTIFIED REGISTERED

## 2024-09-06 PROCEDURE — 76937 US GUIDE VASCULAR ACCESS: CPT

## 2024-09-06 PROCEDURE — 25010000002 CEFAZOLIN PER 500 MG: Performed by: NURSE PRACTITIONER

## 2024-09-06 PROCEDURE — 25810000003 SODIUM CHLORIDE PER 500 ML: Performed by: SURGERY

## 2024-09-06 PROCEDURE — C1888 ENDOVAS NON-CARDIAC ABL CATH: HCPCS | Performed by: SURGERY

## 2024-09-06 PROCEDURE — 36475 ENDOVENOUS RF 1ST VEIN: CPT | Performed by: SURGERY

## 2024-09-06 PROCEDURE — 85610 PROTHROMBIN TIME: CPT | Performed by: SURGERY

## 2024-09-06 PROCEDURE — C1894 INTRO/SHEATH, NON-LASER: HCPCS | Performed by: SURGERY

## 2024-09-06 RX ORDER — HYDROCODONE BITARTRATE AND ACETAMINOPHEN 10; 325 MG/1; MG/1
1 TABLET ORAL EVERY 4 HOURS PRN
Status: DISCONTINUED | OUTPATIENT
Start: 2024-09-06 | End: 2024-09-06 | Stop reason: HOSPADM

## 2024-09-06 RX ORDER — HYDROCODONE BITARTRATE AND ACETAMINOPHEN 5; 325 MG/1; MG/1
1 TABLET ORAL EVERY 4 HOURS PRN
Status: DISCONTINUED | OUTPATIENT
Start: 2024-09-06 | End: 2024-09-06 | Stop reason: HOSPADM

## 2024-09-06 RX ORDER — ONDANSETRON 2 MG/ML
4 INJECTION INTRAMUSCULAR; INTRAVENOUS ONCE AS NEEDED
Status: DISCONTINUED | OUTPATIENT
Start: 2024-09-06 | End: 2024-09-06 | Stop reason: HOSPADM

## 2024-09-06 RX ORDER — FENTANYL CITRATE 50 UG/ML
INJECTION, SOLUTION INTRAMUSCULAR; INTRAVENOUS AS NEEDED
Status: DISCONTINUED | OUTPATIENT
Start: 2024-09-06 | End: 2024-09-06 | Stop reason: SURG

## 2024-09-06 RX ORDER — LIDOCAINE HYDROCHLORIDE 20 MG/ML
INJECTION, SOLUTION EPIDURAL; INFILTRATION; INTRACAUDAL; PERINEURAL AS NEEDED
Status: DISCONTINUED | OUTPATIENT
Start: 2024-09-06 | End: 2024-09-06 | Stop reason: SURG

## 2024-09-06 RX ORDER — TOLTERODINE 4 MG/1
4 CAPSULE, EXTENDED RELEASE ORAL DAILY
COMMUNITY

## 2024-09-06 RX ORDER — FENTANYL CITRATE 50 UG/ML
50 INJECTION, SOLUTION INTRAMUSCULAR; INTRAVENOUS
Status: DISCONTINUED | OUTPATIENT
Start: 2024-09-06 | End: 2024-09-06 | Stop reason: HOSPADM

## 2024-09-06 RX ORDER — SODIUM CHLORIDE 0.9 % (FLUSH) 0.9 %
3 SYRINGE (ML) INJECTION EVERY 12 HOURS SCHEDULED
Status: DISCONTINUED | OUTPATIENT
Start: 2024-09-06 | End: 2024-09-06 | Stop reason: HOSPADM

## 2024-09-06 RX ORDER — DROPERIDOL 2.5 MG/ML
0.62 INJECTION, SOLUTION INTRAMUSCULAR; INTRAVENOUS ONCE AS NEEDED
Status: DISCONTINUED | OUTPATIENT
Start: 2024-09-06 | End: 2024-09-06 | Stop reason: HOSPADM

## 2024-09-06 RX ORDER — NALOXONE HCL 0.4 MG/ML
0.4 VIAL (ML) INJECTION AS NEEDED
Status: DISCONTINUED | OUTPATIENT
Start: 2024-09-06 | End: 2024-09-06 | Stop reason: HOSPADM

## 2024-09-06 RX ORDER — TRAMADOL HYDROCHLORIDE 50 MG/1
50 TABLET ORAL ONCE AS NEEDED
Status: DISCONTINUED | OUTPATIENT
Start: 2024-09-06 | End: 2024-09-06 | Stop reason: HOSPADM

## 2024-09-06 RX ORDER — SODIUM CHLORIDE 0.9 % (FLUSH) 0.9 %
3 SYRINGE (ML) INJECTION AS NEEDED
Status: DISCONTINUED | OUTPATIENT
Start: 2024-09-06 | End: 2024-09-06 | Stop reason: HOSPADM

## 2024-09-06 RX ORDER — SODIUM CHLORIDE, SODIUM LACTATE, POTASSIUM CHLORIDE, CALCIUM CHLORIDE 600; 310; 30; 20 MG/100ML; MG/100ML; MG/100ML; MG/100ML
1000 INJECTION, SOLUTION INTRAVENOUS CONTINUOUS
Status: DISCONTINUED | OUTPATIENT
Start: 2024-09-06 | End: 2024-09-06 | Stop reason: HOSPADM

## 2024-09-06 RX ORDER — SODIUM CHLORIDE, SODIUM LACTATE, POTASSIUM CHLORIDE, CALCIUM CHLORIDE 600; 310; 30; 20 MG/100ML; MG/100ML; MG/100ML; MG/100ML
100 INJECTION, SOLUTION INTRAVENOUS CONTINUOUS
Status: DISCONTINUED | OUTPATIENT
Start: 2024-09-06 | End: 2024-09-06 | Stop reason: HOSPADM

## 2024-09-06 RX ORDER — LABETALOL HYDROCHLORIDE 5 MG/ML
5 INJECTION, SOLUTION INTRAVENOUS
Status: DISCONTINUED | OUTPATIENT
Start: 2024-09-06 | End: 2024-09-06 | Stop reason: HOSPADM

## 2024-09-06 RX ORDER — FLUMAZENIL 0.1 MG/ML
0.2 INJECTION INTRAVENOUS AS NEEDED
Status: DISCONTINUED | OUTPATIENT
Start: 2024-09-06 | End: 2024-09-06 | Stop reason: HOSPADM

## 2024-09-06 RX ORDER — TRAMADOL HYDROCHLORIDE 50 MG/1
50 TABLET ORAL EVERY 6 HOURS PRN
Qty: 10 TABLET | Refills: 0 | Status: SHIPPED | OUTPATIENT
Start: 2024-09-06

## 2024-09-06 RX ORDER — SODIUM CHLORIDE 9 MG/ML
INJECTION, SOLUTION INTRAVENOUS AS NEEDED
Status: DISCONTINUED | OUTPATIENT
Start: 2024-09-06 | End: 2024-09-06 | Stop reason: HOSPADM

## 2024-09-06 RX ORDER — PROPOFOL 10 MG/ML
INJECTION, EMULSION INTRAVENOUS AS NEEDED
Status: DISCONTINUED | OUTPATIENT
Start: 2024-09-06 | End: 2024-09-06 | Stop reason: SURG

## 2024-09-06 RX ORDER — SODIUM CHLORIDE 9 MG/ML
40 INJECTION, SOLUTION INTRAVENOUS AS NEEDED
Status: DISCONTINUED | OUTPATIENT
Start: 2024-09-06 | End: 2024-09-06 | Stop reason: HOSPADM

## 2024-09-06 RX ORDER — LIDOCAINE HYDROCHLORIDE 10 MG/ML
0.5 INJECTION, SOLUTION EPIDURAL; INFILTRATION; INTRACAUDAL; PERINEURAL ONCE AS NEEDED
Status: DISCONTINUED | OUTPATIENT
Start: 2024-09-06 | End: 2024-09-06 | Stop reason: HOSPADM

## 2024-09-06 RX ORDER — SODIUM CHLORIDE 0.9 % (FLUSH) 0.9 %
3-10 SYRINGE (ML) INJECTION AS NEEDED
Status: DISCONTINUED | OUTPATIENT
Start: 2024-09-06 | End: 2024-09-06 | Stop reason: HOSPADM

## 2024-09-06 RX ADMIN — CEFAZOLIN 2000 MG: 2 INJECTION, POWDER, FOR SOLUTION INTRAMUSCULAR; INTRAVENOUS at 09:02

## 2024-09-06 RX ADMIN — PROPOFOL 100 MCG/KG/MIN: 10 INJECTION, EMULSION INTRAVENOUS at 09:01

## 2024-09-06 RX ADMIN — FENTANYL CITRATE 50 MCG: 50 INJECTION, SOLUTION INTRAMUSCULAR; INTRAVENOUS at 09:20

## 2024-09-06 RX ADMIN — PROPOFOL 50 MG: 10 INJECTION, EMULSION INTRAVENOUS at 08:57

## 2024-09-06 RX ADMIN — SODIUM CHLORIDE, POTASSIUM CHLORIDE, SODIUM LACTATE AND CALCIUM CHLORIDE 1000 ML: 600; 310; 30; 20 INJECTION, SOLUTION INTRAVENOUS at 07:22

## 2024-09-06 RX ADMIN — LIDOCAINE HYDROCHLORIDE 100 MG: 20 INJECTION, SOLUTION EPIDURAL; INFILTRATION; INTRACAUDAL; PERINEURAL at 08:57

## 2024-09-06 RX ADMIN — FENTANYL CITRATE 50 MCG: 50 INJECTION, SOLUTION INTRAMUSCULAR; INTRAVENOUS at 09:01

## 2024-09-06 NOTE — OP NOTE
Reina Palomares  9/6/2024     PREOPERATIVE DIAGNOSIS: Chronic venous hypertension with inflammation involving both sides [I87.323]  Preop testing [Z01.818]     POSTOPERATIVE DIAGNOSIS: Post-Op Diagnosis Codes:     * Chronic venous hypertension with inflammation involving both sides [I87.323]     * Preop testing [Z01.818]     PROCEDURE PERFORMED:   1.  Ultrasound-guided cannulation of the left lower extremity greater saphenous vein  2.  Radiofrequency ablation of the left lower extremity greater saphenous vein     SURGEON: Elan Mathews DO      ANESTHESIA: MAC    PREPARATION: Routine.    STAFF: Circulator: Emelina Milligan RN  Scrub Person: Foster Philip  Vascular Ultrasound Technician: Sintia Tran    Estimated Blood Loss: minimal    SPECIMENS: None    COMPLICATIONS: None    INDICATIONS: Reina Palomares is a 74 y.o. female who had complaints of swelling to her lower extremities. She states that her left upper thigh swells bigger than her right. She complains of a discomfort/heaviness while she is up walking, even the simplest tasks such as vacuuming she has to stop and rest. She also complains of discomfort in her calves and her shins. Left is worse than her right. She has tried compression stockings previously, with no relief. She has a history of hepatitis C and is followed by a doctor in [place] for liver varices. She also has a history of lung cancer and has had her left lung removed. She is maintained on Eliquis. She complains of blood spots appearing, I did explain to her that this can happen with the use of Eliquis. She did have noninvasive testing performed today, which I did review in office. The indications, risks, and possible complications of the procedure were explained to the patient, who voiced understanding and wished to proceed with surgery.     PROCEDURE IN DETAIL:   The patient was taken to the operating room and placed on the operating table in a supine position. After MAC anesthesia  was obtained, the left lower extremity was prepped and draped in a sterile manner.  Under ultrasound guidance and using a micropuncture technique the left lower extremity greater saphenous vein was cannulated and a microwire was placed.  This was confirmed under ultrasound.  A small stab incision was made with 11 blade and a 7 Thai sheath was placed.  The patient was placed in Trendelenburg position and the saphenofemoral junction was identified under ultrasound.  The catheter was placed up to the junction and pulled back 3 cm and marked.  Next, tumescent fluid was instilled along the entire length of the vein under ultrasound guidance.  Once sufficient tumescent fluid was placed radiofrequency ablation had commenced.  There was a total of 7 RF cycles for a total treatment length of 39.5 cm for a total treatment time of 2 minutes and 20 seconds.  There was an average of 14 W at an average temperature of 119°C.  Upon completion of the ablation the catheter and sheath were removed.  Direct pressure was held for an additional 5-10 minutes to ensure hemostasis.  An Ace wrap was placed from the toes to the groin.  Sterile dressings were applied. The patient tolerated the procedure well. Sponge and needle counts were correct. The patient was then awakened and transferred to the outpatient center in stable condition.  Elan Mathews DO  Date: 9/6/2024 Time: 09:30 CDT    CC:Carrie Darby DO

## 2024-09-06 NOTE — ANESTHESIA POSTPROCEDURE EVALUATION
Patient: Reina Palomares    Procedure Summary       Date: 09/06/24 [facility]    Anesthesia Start: 0854 Anesthesia Stop: 0935    Procedure: LEFT LOWER EXTREMITY RADIO FREQUENCY ABLATION (Left: Leg Lower) Diagnosis:       Chronic venous hypertension with inflammation involving both sides      Preop testing      (Chronic venous hypertension with inflammation involving both sides [I87.323])      (Preop testing [Z01.818])    Surgeons: Elan Mathews DO Provider: Trav Mckinley CRNA    Anesthesia Type: MAC ASA Status: 3            Anesthesia Type: MAC    Vitals  Vitals Value Taken Time   /67 09/06/24 1001   Temp 97.2 °F (36.2 °C) 09/06/24 0933   Pulse 80 09/06/24 1005   Resp 16 09/06/24 0933   SpO2 96 % 09/06/24 1005   Vitals shown include unfiled device data.        Post Anesthesia Care and Evaluation    Patient location during evaluation: PACU  Patient participation: complete - patient participated  Level of consciousness: awake and alert  Pain management: adequate    Airway patency: patent  Anesthetic complications: No anesthetic complications    Cardiovascular status: acceptable  Respiratory status: acceptable  Hydration status: acceptable    Comments: Blood pressure 113/85, pulse 84, temperature 97.2 °F (36.2 °C), temperature source Temporal, resp. rate 16, SpO2 96%.    Pt discharged from PACU based on pantera score >8

## 2024-09-17 ENCOUNTER — TELEPHONE (OUTPATIENT)
Dept: VASCULAR SURGERY | Facility: CLINIC | Age: 74
End: 2024-09-17
Payer: MEDICARE

## 2024-09-18 ENCOUNTER — HOSPITAL ENCOUNTER (OUTPATIENT)
Dept: ULTRASOUND IMAGING | Facility: HOSPITAL | Age: 74
Discharge: HOME | End: 2024-09-18
Admitting: SURGERY
Payer: MEDICARE

## 2024-09-18 ENCOUNTER — OFFICE VISIT (OUTPATIENT)
Dept: VASCULAR SURGERY | Facility: CLINIC | Age: 74
End: 2024-09-18
Payer: MEDICARE

## 2024-09-18 VITALS
HEART RATE: 74 BPM | DIASTOLIC BLOOD PRESSURE: 80 MMHG | OXYGEN SATURATION: 99 % | WEIGHT: 200 LBS | SYSTOLIC BLOOD PRESSURE: 142 MMHG | HEIGHT: 64 IN | BODY MASS INDEX: 34.15 KG/M2

## 2024-09-18 DIAGNOSIS — I87.323 CHRONIC VENOUS HYPERTENSION WITH INFLAMMATION INVOLVING BOTH SIDES: ICD-10-CM

## 2024-09-18 DIAGNOSIS — I87.323 CHRONIC VENOUS HYPERTENSION WITH INFLAMMATION INVOLVING BOTH SIDES: Primary | ICD-10-CM

## 2024-09-18 DIAGNOSIS — R03.0 ELEVATED BLOOD PRESSURE READING IN OFFICE WITHOUT DIAGNOSIS OF HYPERTENSION: ICD-10-CM

## 2024-09-18 PROCEDURE — 93971 EXTREMITY STUDY: CPT

## 2024-09-24 DIAGNOSIS — I81 PORTAL VEIN THROMBOSIS: ICD-10-CM

## 2024-09-24 RX ORDER — APIXABAN 2.5 MG/1
2.5 TABLET, FILM COATED ORAL 2 TIMES DAILY
Qty: 180 TABLET | Refills: 0 | Status: SHIPPED | OUTPATIENT
Start: 2024-09-24

## 2024-11-19 DIAGNOSIS — I81 PORTAL VEIN THROMBOSIS: ICD-10-CM

## 2024-11-27 ENCOUNTER — HOSPITAL ENCOUNTER (INPATIENT)
Facility: HOSPITAL | Age: 74
LOS: 3 days | Discharge: HOME | DRG: 441 | End: 2024-12-02
Attending: FAMILY MEDICINE | Admitting: FAMILY MEDICINE
Payer: MEDICARE

## 2024-11-27 ENCOUNTER — APPOINTMENT (OUTPATIENT)
Dept: CT IMAGING | Facility: HOSPITAL | Age: 74
DRG: 441 | End: 2024-11-27
Payer: MEDICARE

## 2024-11-27 ENCOUNTER — TELEPHONE (OUTPATIENT)
Dept: INFUSION THERAPY | Age: 74
End: 2024-11-27

## 2024-11-27 DIAGNOSIS — K74.69 OTHER CIRRHOSIS OF LIVER: ICD-10-CM

## 2024-11-27 DIAGNOSIS — K62.5 RECTAL BLEEDING: Primary | ICD-10-CM

## 2024-11-27 DIAGNOSIS — I86.4 GASTRIC VARICES: ICD-10-CM

## 2024-11-27 DIAGNOSIS — K92.1 GASTROINTESTINAL HEMORRHAGE WITH MELENA: ICD-10-CM

## 2024-11-27 DIAGNOSIS — K57.30 SIGMOID DIVERTICULOSIS: ICD-10-CM

## 2024-11-27 DIAGNOSIS — I85.00 ESOPHAGEAL VARICES WITHOUT BLEEDING, UNSPECIFIED ESOPHAGEAL VARICES TYPE: ICD-10-CM

## 2024-11-27 PROBLEM — K92.2 GI BLEEDING: Status: ACTIVE | Noted: 2024-11-27

## 2024-11-27 LAB
ALBUMIN SERPL-MCNC: 4.2 G/DL (ref 3.5–5.2)
ALBUMIN/GLOB SERPL: 1.1 G/DL
ALP SERPL-CCNC: 101 U/L (ref 39–117)
ALT SERPL W P-5'-P-CCNC: 43 U/L (ref 1–33)
ANION GAP SERPL CALCULATED.3IONS-SCNC: 15 MMOL/L (ref 5–15)
APTT PPP: 34.5 SECONDS (ref 24.5–36)
AST SERPL-CCNC: 67 U/L (ref 1–32)
BASOPHILS # BLD AUTO: 0.02 10*3/MM3 (ref 0–0.2)
BASOPHILS NFR BLD AUTO: 0.3 % (ref 0–1.5)
BILIRUB SERPL-MCNC: 0.7 MG/DL (ref 0–1.2)
BUN SERPL-MCNC: 10 MG/DL (ref 8–23)
BUN/CREAT SERPL: 14.9 (ref 7–25)
CALCIUM SPEC-SCNC: 8.7 MG/DL (ref 8.6–10.5)
CHLORIDE SERPL-SCNC: 101 MMOL/L (ref 98–107)
CO2 SERPL-SCNC: 21 MMOL/L (ref 22–29)
CREAT SERPL-MCNC: 0.67 MG/DL (ref 0.57–1)
CRP SERPL-MCNC: 0.81 MG/DL (ref 0–0.5)
D-LACTATE SERPL-SCNC: 1.2 MMOL/L (ref 0.5–2)
D-LACTATE SERPL-SCNC: 2.8 MMOL/L (ref 0.5–2)
DEPRECATED RDW RBC AUTO: 51 FL (ref 37–54)
EGFRCR SERPLBLD CKD-EPI 2021: 92.4 ML/MIN/1.73
EOSINOPHIL # BLD AUTO: 0.09 10*3/MM3 (ref 0–0.4)
EOSINOPHIL NFR BLD AUTO: 1.3 % (ref 0.3–6.2)
ERYTHROCYTE [DISTWIDTH] IN BLOOD BY AUTOMATED COUNT: 15.8 % (ref 12.3–15.4)
ERYTHROCYTE [SEDIMENTATION RATE] IN BLOOD: 25 MM/HR (ref 0–30)
GLOBULIN UR ELPH-MCNC: 4 GM/DL
GLUCOSE SERPL-MCNC: 134 MG/DL (ref 65–99)
HCT VFR BLD AUTO: 34.6 % (ref 34–46.6)
HGB BLD-MCNC: 11.2 G/DL (ref 12–15.9)
HOLD SPECIMEN: NORMAL
IMM GRANULOCYTES # BLD AUTO: 0.02 10*3/MM3 (ref 0–0.05)
IMM GRANULOCYTES NFR BLD AUTO: 0.3 % (ref 0–0.5)
INR PPP: 1.25 (ref 0.91–1.09)
LYMPHOCYTES # BLD AUTO: 1.01 10*3/MM3 (ref 0.7–3.1)
LYMPHOCYTES NFR BLD AUTO: 14.8 % (ref 19.6–45.3)
MAGNESIUM SERPL-MCNC: 1.8 MG/DL (ref 1.6–2.4)
MCH RBC QN AUTO: 29.1 PG (ref 26.6–33)
MCHC RBC AUTO-ENTMCNC: 32.4 G/DL (ref 31.5–35.7)
MCV RBC AUTO: 89.9 FL (ref 79–97)
MONOCYTES # BLD AUTO: 0.56 10*3/MM3 (ref 0.1–0.9)
MONOCYTES NFR BLD AUTO: 8.2 % (ref 5–12)
NEUTROPHILS NFR BLD AUTO: 5.12 10*3/MM3 (ref 1.7–7)
NEUTROPHILS NFR BLD AUTO: 75.1 % (ref 42.7–76)
NRBC BLD AUTO-RTO: 0 /100 WBC (ref 0–0.2)
PLATELET # BLD AUTO: 144 10*3/MM3 (ref 140–450)
PMV BLD AUTO: 10.2 FL (ref 6–12)
POTASSIUM SERPL-SCNC: 3.8 MMOL/L (ref 3.5–5.2)
PROT SERPL-MCNC: 8.2 G/DL (ref 6–8.5)
PROTHROMBIN TIME: 16.2 SECONDS (ref 11.8–14.8)
RBC # BLD AUTO: 3.85 10*6/MM3 (ref 3.77–5.28)
SODIUM SERPL-SCNC: 137 MMOL/L (ref 136–145)
WBC NRBC COR # BLD AUTO: 6.82 10*3/MM3 (ref 3.4–10.8)
WHOLE BLOOD HOLD COAG: NORMAL
WHOLE BLOOD HOLD SPECIMEN: NORMAL

## 2024-11-27 PROCEDURE — 25010000002 METHYLPREDNISOLONE PER 125 MG: Performed by: FAMILY MEDICINE

## 2024-11-27 PROCEDURE — 25510000001 IOPAMIDOL PER 1 ML: Performed by: FAMILY MEDICINE

## 2024-11-27 PROCEDURE — 86140 C-REACTIVE PROTEIN: CPT | Performed by: FAMILY MEDICINE

## 2024-11-27 PROCEDURE — 83605 ASSAY OF LACTIC ACID: CPT | Performed by: FAMILY MEDICINE

## 2024-11-27 PROCEDURE — G0378 HOSPITAL OBSERVATION PER HR: HCPCS

## 2024-11-27 PROCEDURE — 85025 COMPLETE CBC W/AUTO DIFF WBC: CPT | Performed by: FAMILY MEDICINE

## 2024-11-27 PROCEDURE — 85652 RBC SED RATE AUTOMATED: CPT | Performed by: FAMILY MEDICINE

## 2024-11-27 PROCEDURE — 36415 COLL VENOUS BLD VENIPUNCTURE: CPT

## 2024-11-27 PROCEDURE — 83735 ASSAY OF MAGNESIUM: CPT | Performed by: FAMILY MEDICINE

## 2024-11-27 PROCEDURE — 63710000001 DIPHENHYDRAMINE PER 50 MG: Performed by: STUDENT IN AN ORGANIZED HEALTH CARE EDUCATION/TRAINING PROGRAM

## 2024-11-27 PROCEDURE — 74174 CTA ABD&PLVS W/CONTRAST: CPT

## 2024-11-27 PROCEDURE — 25010000002 DIPHENHYDRAMINE PER 50 MG: Performed by: FAMILY MEDICINE

## 2024-11-27 PROCEDURE — 85610 PROTHROMBIN TIME: CPT | Performed by: FAMILY MEDICINE

## 2024-11-27 PROCEDURE — 99285 EMERGENCY DEPT VISIT HI MDM: CPT

## 2024-11-27 PROCEDURE — 80053 COMPREHEN METABOLIC PANEL: CPT | Performed by: FAMILY MEDICINE

## 2024-11-27 PROCEDURE — 85730 THROMBOPLASTIN TIME PARTIAL: CPT | Performed by: FAMILY MEDICINE

## 2024-11-27 RX ORDER — SODIUM CHLORIDE 0.9 % (FLUSH) 0.9 %
10 SYRINGE (ML) INJECTION AS NEEDED
Status: DISCONTINUED | OUTPATIENT
Start: 2024-11-27 | End: 2024-12-02 | Stop reason: HOSPADM

## 2024-11-27 RX ORDER — BISACODYL 5 MG/1
5 TABLET, DELAYED RELEASE ORAL DAILY PRN
Status: DISCONTINUED | OUTPATIENT
Start: 2024-11-27 | End: 2024-12-02 | Stop reason: HOSPADM

## 2024-11-27 RX ORDER — OXYBUTYNIN CHLORIDE 5 MG/1
10 TABLET, EXTENDED RELEASE ORAL DAILY
Status: DISCONTINUED | OUTPATIENT
Start: 2024-11-28 | End: 2024-12-02 | Stop reason: HOSPADM

## 2024-11-27 RX ORDER — NITROGLYCERIN 0.4 MG/1
0.4 TABLET SUBLINGUAL
Status: DISCONTINUED | OUTPATIENT
Start: 2024-11-27 | End: 2024-12-02 | Stop reason: HOSPADM

## 2024-11-27 RX ORDER — ACETAMINOPHEN 650 MG/1
650 SUPPOSITORY RECTAL EVERY 4 HOURS PRN
Status: DISCONTINUED | OUTPATIENT
Start: 2024-11-27 | End: 2024-12-02 | Stop reason: HOSPADM

## 2024-11-27 RX ORDER — METHYLPREDNISOLONE SODIUM SUCCINATE 125 MG/2ML
125 INJECTION, POWDER, LYOPHILIZED, FOR SOLUTION INTRAMUSCULAR; INTRAVENOUS ONCE
Status: COMPLETED | OUTPATIENT
Start: 2024-11-27 | End: 2024-11-27

## 2024-11-27 RX ORDER — AMOXICILLIN 250 MG
2 CAPSULE ORAL 2 TIMES DAILY PRN
Status: DISCONTINUED | OUTPATIENT
Start: 2024-11-27 | End: 2024-12-02 | Stop reason: HOSPADM

## 2024-11-27 RX ORDER — POLYETHYLENE GLYCOL 3350 17 G/17G
17 POWDER, FOR SOLUTION ORAL DAILY PRN
Status: DISCONTINUED | OUTPATIENT
Start: 2024-11-27 | End: 2024-12-02 | Stop reason: HOSPADM

## 2024-11-27 RX ORDER — DIPHENHYDRAMINE HCL 25 MG
25 CAPSULE ORAL EVERY 6 HOURS PRN
Status: DISCONTINUED | OUTPATIENT
Start: 2024-11-27 | End: 2024-11-29

## 2024-11-27 RX ORDER — BISACODYL 10 MG
10 SUPPOSITORY, RECTAL RECTAL DAILY PRN
Status: DISCONTINUED | OUTPATIENT
Start: 2024-11-27 | End: 2024-12-02 | Stop reason: HOSPADM

## 2024-11-27 RX ORDER — LEVOTHYROXINE SODIUM 88 UG/1
88 TABLET ORAL
Status: DISCONTINUED | OUTPATIENT
Start: 2024-11-28 | End: 2024-11-27

## 2024-11-27 RX ORDER — ACETAMINOPHEN 160 MG/5ML
650 SOLUTION ORAL EVERY 4 HOURS PRN
Status: DISCONTINUED | OUTPATIENT
Start: 2024-11-27 | End: 2024-12-02 | Stop reason: HOSPADM

## 2024-11-27 RX ORDER — LEVOTHYROXINE SODIUM 112 UG/1
112 TABLET ORAL
Status: DISCONTINUED | OUTPATIENT
Start: 2024-11-28 | End: 2024-12-02 | Stop reason: HOSPADM

## 2024-11-27 RX ORDER — LEVOTHYROXINE SODIUM 112 UG/1
112 TABLET ORAL DAILY
COMMUNITY

## 2024-11-27 RX ORDER — SODIUM CHLORIDE 9 MG/ML
40 INJECTION, SOLUTION INTRAVENOUS AS NEEDED
Status: DISCONTINUED | OUTPATIENT
Start: 2024-11-27 | End: 2024-12-02 | Stop reason: HOSPADM

## 2024-11-27 RX ORDER — DIPHENHYDRAMINE HYDROCHLORIDE 50 MG/ML
50 INJECTION INTRAMUSCULAR; INTRAVENOUS ONCE
Status: COMPLETED | OUTPATIENT
Start: 2024-11-27 | End: 2024-11-27

## 2024-11-27 RX ORDER — ACETAMINOPHEN 325 MG/1
650 TABLET ORAL EVERY 4 HOURS PRN
Status: DISCONTINUED | OUTPATIENT
Start: 2024-11-27 | End: 2024-12-02 | Stop reason: HOSPADM

## 2024-11-27 RX ORDER — OXYBUTYNIN CHLORIDE 10 MG/1
10 TABLET, EXTENDED RELEASE ORAL DAILY
COMMUNITY

## 2024-11-27 RX ORDER — DIPHENHYDRAMINE HYDROCHLORIDE, ZINC ACETATE 2; .1 G/100G; G/100G
1 CREAM TOPICAL 3 TIMES DAILY PRN
Status: DISCONTINUED | OUTPATIENT
Start: 2024-11-27 | End: 2024-12-02 | Stop reason: HOSPADM

## 2024-11-27 RX ORDER — IOPAMIDOL 755 MG/ML
100 INJECTION, SOLUTION INTRAVASCULAR
Status: COMPLETED | OUTPATIENT
Start: 2024-11-27 | End: 2024-11-27

## 2024-11-27 RX ORDER — SODIUM CHLORIDE 0.9 % (FLUSH) 0.9 %
10 SYRINGE (ML) INJECTION EVERY 12 HOURS SCHEDULED
Status: DISCONTINUED | OUTPATIENT
Start: 2024-11-27 | End: 2024-12-02 | Stop reason: HOSPADM

## 2024-11-27 RX ADMIN — METHYLPREDNISOLONE SODIUM SUCCINATE 125 MG: 125 INJECTION, POWDER, FOR SOLUTION INTRAMUSCULAR; INTRAVENOUS at 13:40

## 2024-11-27 RX ADMIN — IOPAMIDOL 100 ML: 755 INJECTION, SOLUTION INTRAVENOUS at 14:17

## 2024-11-27 RX ADMIN — Medication 10 ML: at 20:28

## 2024-11-27 RX ADMIN — DIPHENHYDRAMINE HYDROCHLORIDE 25 MG: 25 CAPSULE ORAL at 20:27

## 2024-11-27 RX ADMIN — DIPHENHYDRAMINE HYDROCHLORIDE 50 MG: 50 INJECTION, SOLUTION INTRAMUSCULAR; INTRAVENOUS at 13:39

## 2024-11-27 NOTE — TELEPHONE ENCOUNTER
Pt called and states that she has shingles and was put on a medicine for that.   States she is now breaking out in hives all over her body.   No difficulty breathing.  She is also bleeding from her rectum.  She called her doctor in [place] and they told her to go to the ER.   She wants Fiona's opinion.   Explained to her that Fioan was out of the office and I also advise her to go to the ER.   She voiced understanding.

## 2024-11-27 NOTE — H&P
[facility] Medicine Services  HISTORY AND PHYSICAL    Date of Admission: 11/27/2024  Primary Care Physician: Dash Melendez MD    Subjective   Primary Historian: Self    Chief Complaint: Rectal bleeding    History of Present Illness  Reina Palomares is a 74 y.o. 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  She had a bowel movement yesterday which most mostly blood and very little stool and then also found a spot of blood in her bed when she woke up today.  She did not have any repeated episodes of bleeding in the ER.  She denies shortness of breath, chest pain, nausea or vomiting.  Additionally she also reports a skin rash.  She states that she thinks she had a bug bite in her left lower leg in May and then eventually developed blisters in her left ankle for which she was seen by her primary care doc via telehealth visit and was prescribed valacyclovir and Bactrim that she started 11/21.  For the past few days she has developed itching and erythema on her 4 extremities and some on her torso as well.    Hospital medicine services asked to admit by ER physician after he discussed the case with GI physician who recommends observation.    In the ER patient is hemodynamically stable and hemoglobin is 11.        Review of Systems   Otherwise complete ROS reviewed and negative except as mentioned in the HPI.    Past Medical History:   Past Medical History:   Diagnosis Date    Anxiety     Arthritis     Blood clot in abdominal vein     Cancer     LUNG    Cirrhosis     states had blood clot neer liver and was told she had cirrhosis    GERD (gastroesophageal reflux disease)      Hyperthyroidism     Infectious viral hepatitis     H/O HEPATITIS C    Swelling of lower extremity     Varices, esophageal      Past Surgical History:  Past Surgical History:   Procedure Laterality Date    CARDIAC CATHETERIZATION      CHOLECYSTECTOMY  2020    [place]    CLAVICLE SURGERY Right     COLONOSCOPY      HYSTERECTOMY      TOTAL ABDOMINAL HYSTERECTOMY    KNEE ARTHROSCOPY Bilateral     LUNG REMOVAL, TOTAL Left     REPLACEMENT TOTAL KNEE Left     THYROID BIOPSY      BENIGN    TUBAL ABDOMINAL LIGATION      X2    VARICOSE VEIN SURGERY Left 9/6/2024    Procedure: LEFT LOWER EXTREMITY RADIO FREQUENCY ABLATION;  Surgeon: Elan Mathews DO;  Location: [facility] OR;  Service: Vascular;  Laterality: Left;     Social History:  reports that she quit smoking about 16 years ago. Her smoking use included cigarettes. She has never used smokeless tobacco. She reports current alcohol use. She reports that she does not use drugs.    Family History: family history includes Cancer in her brother and another family member; Coronary artery disease in her brother and another family member; Hyperthyroidism in her sister; Hypothyroidism in her sister; Melanoma in her sister and another family member; Other in her father and mother; Thyroid disease in an other family member.       Allergies:  Allergies   Allergen Reactions    Dilaudid [Hydromorphone Hcl] Hallucinations       Medications:  Prior to Admission medications    Medication Sig Start Date End Date Taking? Authorizing Provider   apixaban (ELIQUIS) 2.5 MG tablet tablet Take 1 tablet by mouth Every 12 (Twelve) Hours. 11/17/23   Leticia Deshpande MD   levothyroxine (SYNTHROID, LEVOTHROID) 88 MCG tablet Take 1 tablet by mouth Daily.    Leticia Deshpande MD   loratadine (CLARITIN) 10 MG tablet Take 1 tablet by mouth Daily.    Leticia Deshpande MD   propranolol (INDERAL) 20 MG tablet Take 0.5 tablets by mouth 2 (Two) Times a Day.    Leticia Deshpande MD  "  tiotropium bromide-olodaterol (Stiolto Respimat) 2.5-2.5 MCG/ACT aerosol solution inhaler Inhale 2 puffs 2 (Two) Times a Day As Needed (takes when needed).    Leticia Deshpande MD   tiZANidine (ZANAFLEX) 4 MG tablet Take 1 tablet by mouth At Night As Needed for Muscle Spasms.    Leticia Deshpande MD   tolterodine LA (DETROL LA) 4 MG 24 hr capsule Take 1 capsule by mouth Daily.    Leticia Deshpande MD   traMADol (ULTRAM) 50 MG tablet Take 1 tablet by mouth Every 6 (Six) Hours As Needed for Moderate Pain. 9/6/24   Elan Mathews DO   trospium (SANCTURA) 20 MG tablet Take 1 tablet by mouth Daily. 3/22/24   Leticia Deshpande MD     I have utilized all available immediate resources to obtain, update, or review the patient's current medications (including all prescriptions, over-the-counter products, herbals, cannabis/cannabidiol products, and vitamin/mineral/dietary (nutritional) supplements).    Objective     Vital Signs: /68   Pulse 81   Temp 99.2 °F (37.3 °C)   Resp 18   Ht 162.6 cm (64\")   Wt 90.3 kg (199 lb)   SpO2 100%   BMI 34.16 kg/m²   Physical Exam   GEN: Awake, alert, interactive, in NAD on room air   HEENT: Atraumatic, PERRLA, EOMI, Anicteric, Trachea midline  Lungs: CTAB  Heart: RRR, +S1/s2, no rub  ABD: soft, nt/nd, +BS, no guarding/rebound  Extremities: atraumatic, no cyanosis, no edema  Skin: Erythematous rash and dry scabs on 4 extremities, lichenification and excoriations seen  Neuro: AAOx3, no focal deficits  Psych: normal mood & affect        Results Reviewed:  Lab Results (last 24 hours)       Procedure Component Value Units Date/Time    STAT Lactic Acid, Reflex [[id number]]  (Normal) Collected: 11/27/24 1506    Specimen: Blood Updated: 11/27/24 1525     Lactate 1.2 mmol/L     Sedimentation Rate [[id number]]  (Normal) Collected: 11/27/24 1205    Specimen: Blood Updated: 11/27/24 1322     Sed Rate 25 mm/hr     CBC & Differential [[id number]]  (Abnormal) Collected: " 11/27/24 1205    Specimen: Blood Updated: 11/27/24 1318    Narrative:      The following orders were created for panel order CBC & Differential.  Procedure                               Abnormality         Status                     ---------                               -----------         ------                     CBC Auto Differential[[id number]]        Abnormal            Final result                 Please view results for these tests on the individual orders.    CBC Auto Differential [[id number]]  (Abnormal) Collected: 11/27/24 1205    Specimen: Blood Updated: 11/27/24 1318     WBC 6.82 10*3/mm3      RBC 3.85 10*6/mm3      Hemoglobin 11.2 g/dL      Hematocrit 34.6 %      MCV 89.9 fL      MCH 29.1 pg      MCHC 32.4 g/dL      RDW 15.8 %      RDW-SD 51.0 fl      MPV 10.2 fL      Platelets 144 10*3/mm3      Neutrophil % 75.1 %      Lymphocyte % 14.8 %      Monocyte % 8.2 %      Eosinophil % 1.3 %      Basophil % 0.3 %      Immature Grans % 0.3 %      Neutrophils, Absolute 5.12 10*3/mm3      Lymphocytes, Absolute 1.01 10*3/mm3      Monocytes, Absolute 0.56 10*3/mm3      Eosinophils, Absolute 0.09 10*3/mm3      Basophils, Absolute 0.02 10*3/mm3      Immature Grans, Absolute 0.02 10*3/mm3      nRBC 0.0 /100 WBC     Lactic Acid, Plasma [[id number]]  (Abnormal) Collected: 11/27/24 1205    Specimen: Blood Updated: 11/27/24 1313     Lactate 2.8 mmol/L     Comprehensive Metabolic Panel [[id number]]  (Abnormal) Collected: 11/27/24 1205    Specimen: Blood Updated: 11/27/24 1309     Glucose 134 mg/dL      BUN 10 mg/dL      Creatinine 0.67 mg/dL      Sodium 137 mmol/L      Potassium 3.8 mmol/L      Chloride 101 mmol/L      CO2 21.0 mmol/L      Calcium 8.7 mg/dL      Total Protein 8.2 g/dL      Albumin 4.2 g/dL      ALT (SGPT) 43 U/L      AST (SGOT) 67 U/L      Alkaline Phosphatase 101 U/L      Total Bilirubin 0.7 mg/dL      Globulin 4.0 gm/dL      A/G Ratio 1.1 g/dL      BUN/Creatinine Ratio 14.9     Anion Gap 15.0 mmol/L       eGFR 92.4 mL/min/1.73     Narrative:      GFR Normal >60  Chronic Kidney Disease <60  Kidney Failure <15    The GFR formula is only valid for adults with stable renal function between ages 18 and 70.    C-reactive Protein [[id number]]  (Abnormal) Collected: 11/27/24 1205    Specimen: Blood Updated: 11/27/24 1309     C-Reactive Protein 0.81 mg/dL     Magnesium [[id number]]  (Normal) Collected: 11/27/24 1205    Specimen: Blood Updated: 11/27/24 1307     Magnesium 1.8 mg/dL     Protime-INR [[id number]]  (Abnormal) Collected: 11/27/24 1205    Specimen: Blood Updated: 11/27/24 1305     Protime 16.2 Seconds      INR 1.25    aPTT [[id number]]  (Normal) Collected: 11/27/24 1205    Specimen: Blood Updated: 11/27/24 1305     PTT 34.5 seconds     Peak Draw [[id number]] Collected: 11/27/24 1205    Specimen: Blood Updated: 11/27/24 1215    Narrative:      The following orders were created for panel order Peak Draw.  Procedure                               Abnormality         Status                     ---------                               -----------         ------                     Green Top (Gel)[[id number]]                                  Final result               Lavender Top[[id number]]                                     Final result               Red Top[[id number]]                                          Final result               Gray Top[[id number]]                                         Final result               Light Blue Top[[id number]]                                   Final result                 Please view results for these tests on the individual orders.    Green Top (Gel) [[id number]] Collected: 11/27/24 1205    Specimen: Blood Updated: 11/27/24 1215     Extra Tube Hold for add-ons.     Comment: Auto resulted.       Lavender Top [[id number]] Collected: 11/27/24 1205    Specimen: Blood Updated: 11/27/24 1215     Extra Tube hold for add-on     Comment: Auto resulted       Red Top [[id number]] Collected:  11/27/24 1205    Specimen: Blood Updated: 11/27/24 1215     Extra Tube Hold for add-ons.     Comment: Auto resulted.       Gray Top [[id number]] Collected: 11/27/24 1205    Specimen: Blood Updated: 11/27/24 1215     Extra Tube Hold for add-ons.     Comment: Auto resulted.       Light Blue Top [[id number]] Collected: 11/27/24 1205    Specimen: Blood Updated: 11/27/24 1215     Extra Tube Hold for add-ons.     Comment: Auto resulted             Imaging Results (Last 24 Hours)       Procedure Component Value Units Date/Time    CT Angiogram Abdomen Pelvis [[id number]] Collected: 11/27/24 1427     Updated: 11/27/24 1440    Narrative:      CT ANGIOGRAM ABDOMEN PELVIS- 11/27/2024 1:07 PM     HISTORY: Bright red blood per rectum     COMPARISON: Abdominal CT dated 12/25/2023     DOSE LENGTH PRODUCT: 1602.05 mGy.cm. Automated exposure control was also  utilized to decrease patient radiation dose.     TECHNIQUE: Helical tomographic images of the abdomen and pelvis  utilizing angiographic protocol were obtained following the intravenous  infusion of contrast. Multiplanar and 3 D reformatted images were  provided for review.     FINDINGS:     ANGIOGRAM:     Abdominal aorta is normal in caliber. Moderate atheromatous  calcification along the abdominal aorta. No dissection or flow-limiting  stenosis. No flow-limiting stenosis at the origin of the celiac or  superior mesenteric arteries. CECE is patent. No flow-limiting stenosis  at the renal artery origins.     Calcified atheromatous plaque along the common iliac arteries. Mild  atheromatous narrowing along the left common iliac artery. Moderate to  high-grade atheromatous narrowing at the origin of the left internal  iliac artery. External iliac arteries are patent and normal in caliber  with no flow-limiting stenosis. Included portions of the bilateral  superficial and deep femoral arteries are patent.        OTHER FINDINGS:     LOWER CHEST: Previous pneumonectomy on the left with  right to left  mediastinal shift. No consolidation of the right lung base.     LIVER: Liver surface contour appears slightly nodular. There may be a  recanalized periumbilical vein as well. The main portal vein appears  dilated. No focal lesion identified. Hepatic veins are patent.     BILIARY SYSTEM: Prior cholecystectomy. Biliary tree is nondilated..     PANCREAS: No focal lesion. Main duct is nondilated.     SPLEEN: Enlarged, measuring 17.4 cm craniocaudal.     KIDNEYS AND ADRENALS: Adrenal glands are unremarkable. Kidneys are  unremarkable. The ureters are decompressed and normal in appearance.     RETROPERITONEUM: No mass, lymphadenopathy or hemorrhage.     GI TRACT: Lower paraesophageal and gastric varices. No arterial contrast  extravasation or delayed contrast pooling. Small bowel loops are  nondilated.  No inflammatory changes are seen along the colon. Fairly  extensive colonic diverticulosis. No acute diverticulitis. Again, there  is no active extravasation or intraluminal contrast pooling identified  in the colon.     OTHER: There is no mesenteric mass, lymphadenopathy, free fluid or free  air. No acute bony abnormality. Lumbar facet arthropathy.     PELVIS: No mass lesion, fluid collection or significant lymphadenopathy  is seen in the pelvis. The urinary bladder is normal in appearance.       Impression:      1. No arterial contrast extravasation or delayed intraluminal contrast  pooling to suggest active brisk GI bleed.  2. Exam is concerning for liver cirrhosis with sequelae of portal  hypertension including splenomegaly and lower esophageal and gastric  varices, a potential source for bleed although no active bleed is seen.  3. Fairly extensive diverticulosis along the sigmoid colon as well which  could be a potential source for bleed although no active bleed is seen.  No acute diverticulitis.        This report was signed and finalized on 11/27/2024 2:36 PM by Dr Raheel Kate.             I have  personally reviewed and interpreted the radiology studies and ECG obtained at time of admission.     Assessment / Plan   Assessment:   Active Hospital Problems    Diagnosis     **GI bleeding        Treatment Plan  The patient will be admitted to my service [facility] at [facility].     # Lower GI bleed  CT angio of the abdomen: No arterial contrast extravasation to suggest active brisk GI bleed.  Liver cirrhosis with sequela of portal hypertension, splenomegaly, lower esophageal and gastric varices.  Extensive diverticulosis of sigmoid colon.  - Telemetry  - N.p.o. except sips of water  - H&H every 6 hours  - GI consulted by ER  - Will observe overnight, may need colonoscopy  - Holding Eliquis    # Liver cirrhosis with esophageal and gastric varices  # Portal vein thrombosis on Eliquis  Follows up with GI at [place]  Appreciate recommendation from GI regarding when to resume Eliquis      # Pruritic rash  She was recently diagnosed with shingles on a telehealth visit and prescribed valacyclovir and Bactrim. Patient's feels as if she is allergic to one of those medicines.  - Stop offensive drugs  - As needed Benadryl for pruritus  - Diphenhydramine-zinc cream    Other problems chronic:  # Controlled hypertension -not on any meds at home.  Monitor  # Hypothyroidism - continue Synthroid 112 mcg  # PAD - follows with Dr. Mathews vascular surgery  # History of lung cancer s/p left pneumonectomy -continue 2 L of oxygen at night    Medical Decision Making  Number and Complexity of problems: 2 acute moderate complexity, 1 chronic high complexity, others chronic  Differential Diagnosis: As above    Conditions and Status        New. Stable     MDM Data  External documents reviewed: Reviewed  Cardiac tracing (EKG, telemetry) interpretation: Reviewed  Radiology interpretation: Reviewed  Labs reviewed: As above  Any tests that were considered but not ordered: None     Decision rules/scores evaluated (example  [id number], [place], etc): None     Discussed with: Patient and her sister     Care Planning  Shared decision making: Patient apprised of current labs, vitals, imaging and treatment plan.  They are agreeable with proceeding with plans as discussed.   Code status and discussions: No CPR    Disposition  Social Determinants of Health that impact treatment or disposition: None  Estimated length of stay is 1-2 days.     I confirmed that the patient's advanced care plan is present, code status is documented, and a surrogate decision maker is listed in the patient's medical record.     The patient's surrogate decision maker is her son.     The patient was seen and examined by me on 11/27 at 1620.    Electronically signed by Parker Castro MD, 11/27/24, 15:43 CST.

## 2024-11-27 NOTE — ED PROVIDER NOTES
"HPI:     Patient is a 74-year-old white female presents to the emergency room with multiple complaints.  The primary complaint is having bright red blood per rectum.  She is on Eliquis.  She has taken Eliquis for a blood clot in her abdomen.  Patient states that she also was recently diagnosed with \"shingles on the right forearm\".  However this same rash started spreading on bilateral forearms, bilateral lower extremities and trunk.  Patient was given Bactrim recently and she is concerned that maybe the valacyclovir or Bactrim was the cause of her rash.  No fevers chills or night sweats.  There is diarrhea that is bloody.  No chest pain shortness of breath or diaphoresis.  Patient states the abdomen discomfort is 4 out of 10 with the irritation from the \"rash\" is intolerable.      REVIEW OF SYSTEMS  CONSTITUTIONAL:  No complaints of fever, chills,or weakness  EYES:  No complaints of discharge   ENT: No complaints of sore throat or ear pain  CARDIOVASCULAR:  No complaints of chest pain, palpitations, or swelling  RESPIRATORY:  No complaints of cough or shortness of breath  GI: Positive for bright red blood per rectum.  No clots noted.    MUSCULOSKELETAL:  No complaints of back pain  SKIN: Positive for diffuse pruritic rash on all 4 extremities and trunk.  NEUROLOGIC:  No complaints of headache, focal weakness, or sensory changes  ENDOCRINE:  No complaints of polyuria or polydipsia  LYMPHATIC:  No complaints of swollen glands  GENITOURINARY: No complaints of urinary frequency or hematuria      PAST MEDICAL HISTORY  Past Medical History:   Diagnosis Date    Anxiety     Arthritis     Blood clot in abdominal vein     Cancer     LUNG    Cirrhosis     states had blood clot neer liver and was told she had cirrhosis    GERD (gastroesophageal reflux disease)     Hyperthyroidism     Infectious viral hepatitis     H/O HEPATITIS C    Swelling of lower extremity     Varices, esophageal        FAMILY HISTORY  Family History "   Problem Relation Age of Onset    Cancer Other     Coronary artery disease Other     Thyroid disease Other     Melanoma Other     Melanoma Sister         LEFT ARM    Hypothyroidism Sister     Cancer Brother     Coronary artery disease Brother     Hyperthyroidism Sister     Other Mother     Other Father     Breast cancer Neg Hx        SOCIAL HISTORY  Social History     Socioeconomic History    Marital status:    Tobacco Use    Smoking status: Former     Current packs/day: 0.00     Types: Cigarettes     Quit date:      Years since quittin.9    Smokeless tobacco: Never    Tobacco comments:     LESS THAN 1 PPD FOR 12 YEARS   Vaping Use    Vaping status: Never Used   Substance and Sexual Activity    Alcohol use: Yes     Comment: OCCASIONAL WINE    Drug use: No    Sexual activity: Defer     Partners: Male     Birth control/protection: None       IMMUNIZATION HISTORY  Deferred to primary care physician.    SURGICAL HISTORY  Past Surgical History:   Procedure Laterality Date    CARDIAC CATHETERIZATION      CHOLECYSTECTOMY      [place]    CLAVICLE SURGERY Right     COLONOSCOPY      HYSTERECTOMY      TOTAL ABDOMINAL HYSTERECTOMY    KNEE ARTHROSCOPY Bilateral     LUNG REMOVAL, TOTAL Left     REPLACEMENT TOTAL KNEE Left     THYROID BIOPSY      BENIGN    TUBAL ABDOMINAL LIGATION      X2    VARICOSE VEIN SURGERY Left 2024    Procedure: LEFT LOWER EXTREMITY RADIO FREQUENCY ABLATION;  Surgeon: Elan Mathews DO;  Location:  [facility];  Service: Vascular;  Laterality: Left;       CURRENT MEDICATIONS    Current Facility-Administered Medications:     sodium chloride 0.9 % flush 10 mL, 10 mL, Intravenous, PRN, Emergency, Triage Protocol, MD    [COMPLETED] Insert Peripheral IV, , , Once **AND** sodium chloride 0.9 % flush 10 mL, 10 mL, Intravenous, PRN, Jenaro Tan Jr., MD    Current Outpatient Medications:     apixaban (ELIQUIS) 2.5 MG tablet tablet, Take 1 tablet by mouth Every 12 (Twelve)  "Hours., Disp: , Rfl:     levothyroxine (SYNTHROID, LEVOTHROID) 88 MCG tablet, Take 1 tablet by mouth Daily., Disp: , Rfl:     loratadine (CLARITIN) 10 MG tablet, Take 1 tablet by mouth Daily., Disp: , Rfl:     propranolol (INDERAL) 20 MG tablet, Take 0.5 tablets by mouth 2 (Two) Times a Day., Disp: , Rfl:     tiotropium bromide-olodaterol (Stiolto Respimat) 2.5-2.5 MCG/ACT aerosol solution inhaler, Inhale 2 puffs 2 (Two) Times a Day As Needed (takes when needed)., Disp: , Rfl:     tiZANidine (ZANAFLEX) 4 MG tablet, Take 1 tablet by mouth At Night As Needed for Muscle Spasms., Disp: , Rfl:     tolterodine LA (DETROL LA) 4 MG 24 hr capsule, Take 1 capsule by mouth Daily., Disp: , Rfl:     traMADol (ULTRAM) 50 MG tablet, Take 1 tablet by mouth Every 6 (Six) Hours As Needed for Moderate Pain., Disp: 10 tablet, Rfl: 0    trospium (SANCTURA) 20 MG tablet, Take 1 tablet by mouth Daily., Disp: , Rfl:     ALLERGIES  Allergies   Allergen Reactions    Dilaudid [Hydromorphone Hcl] Hallucinations       ABDOMINAL EXAM    VITAL SIGNS:   /67   Pulse 82   Temp 99.2 °F (37.3 °C)   Resp 18   Ht 162.6 cm (64\")   Wt 90.3 kg (199 lb)   SpO2 98%   BMI 34.16 kg/m²     Constitutional: Patient is alert and in no distress.  Patient with entire body itching and abdominal discomfort.    ENT: There is a normal pharynx with no acute erythema or exudate and oral mucosa is moist.  There are no oral or perioral lesions other vesicles noted.      Nose is clear with no drainage.  Tympanic membranes intact and non-erythemic    Cardiovascular: S1-S2 regular rate and rhythm. No murmurs, rubs or gallops.  Pulses are equal bilaterally and there is no pitting edema.    Respiratory: Patient is clear to auscultation bilaterally with no wheezing or rhonchi.  Chest wall is nontender.  There are no external lesions on the chest.  There is no crepitance.    Gastrointestinal: Bowel sounds are normal in all 4 quadrants.  There is no abdominal " distention or fluid wave.  There is no rebound or guarding.  There is tenderness in the lower half of the abdomen.    Genitourinary: Patient is voiding appropriately.    Integument: Diffuse bilateral upper and lower extremity and trunk micropapular rash with no pustules.  There is a base erythema that blanches that is noted.    Karsten Coma Scale: Total score 15    Neurological: Patient is alert and oriented x4 and no acute findings noted.  Speech is fluent and cognition is normal.  No evidence of acute CVA.  Cranial nerves II through XII intact.  Patient with normal motor function as well as reflexes and sensation      Psychiatric: Normal affect and mood.      RADIOLOGY/PROCEDURES        CT Angiogram Abdomen Pelvis   Final Result   1. No arterial contrast extravasation or delayed intraluminal contrast   pooling to suggest active brisk GI bleed.   2. Exam is concerning for liver cirrhosis with sequelae of portal   hypertension including splenomegaly and lower esophageal and gastric   varices, a potential source for bleed although no active bleed is seen.   3. Fairly extensive diverticulosis along the sigmoid colon as well which   could be a potential source for bleed although no active bleed is seen.   No acute diverticulitis.           This report was signed and finalized on 11/27/2024 2:36 PM by Dr Raheel Kate.                 FUTURE APPOINTMENTS     Future Appointments   Date Time Provider Department Center   12/18/2024  1:15 PM Yuliet Brennan APRN [facility]              COURSE & MEDICAL DECISION MAKING       Patient's partial differential diagnosis can include:    Esophagitis, gastritis, diverticulitis, diverticulosis, bleeding peptic ulcer, colitis, acute appendicitis, gastritis, gastroenteritis, colitis, enteritis, volvulus, intussusception, esophageal spasms, gastroparesis, GERD, peptic ulcer disease, perforated esophagus, perforated peptic ulcer, partial bowel obstruction, bowel obstruction,,  irritable bowel, Crohn's disease, ulcerative colitis, celiac disease and others    Patient has had 1 additional bloody stool since being here in the emergency room.  However the Solu-Medrol and Benadryl has not helped with the patient's significant itching.  Waiting for the results of the patient CTA of the abdomen pelvis as well as her laboratory test.      Patient's laboratory test shows a normal white blood cell count with a hemoglobin 11.2.  And normally runs between 12 and 13.7.  CTA of the abdomen pelvis shows no active bleed or large bleeding but does shows large amount of diverticular disease.  With the patient's continued itching as well as her continued blood loss and her CT scan showing a large diverticulosis the patient does not feel comfortable going home with the amount of rectal bleeding she continues to have with her bowel movements.  And the [place] one [place] with her to be able to bring her back to the hospital due to her son being a  and away from the home 6 days out of the week.    Discussed the case with gastroenterologist Dr. Oconnell who states the patient will be consulted by her and to closely monitor her hemoglobin.  If she continues to seem like she drops off reversal of her anticoagulation with Eliquis could be an option for the patient.  At this time with the bright red blood it does not seem to be a variceal bleed.  At this time no octreotide has been necessarily warranted.    Awaiting return call from the hospitalist for admission.    Spoke with nurse practitioner hospitalist Sharifa who states the patient can be admitted to Dr Castro he is aware that the patient has a consult into gastroenterologist Dr. Oconnell.      Patient's level of risk: Moderate       CRITICAL CARE    CRITICAL CARE: No    CRITICAL CARE TIME: None      Recent Results (from the past 24 hours)   Green Top (Gel)    Collection Time: 11/27/24 12:05 PM    Result Value Ref Range    Extra Tube Hold for add-ons.    Lavender Top    Collection Time: 11/27/24 12:05 PM   Result Value Ref Range    Extra Tube hold for add-on    Red Top    Collection Time: 11/27/24 12:05 PM   Result Value Ref Range    Extra Tube Hold for add-ons.    Gray Top    Collection Time: 11/27/24 12:05 PM   Result Value Ref Range    Extra Tube Hold for add-ons.    Light Blue Top    Collection Time: 11/27/24 12:05 PM   Result Value Ref Range    Extra Tube Hold for add-ons.    Comprehensive Metabolic Panel    Collection Time: 11/27/24 12:05 PM    Specimen: Blood   Result Value Ref Range    Glucose 134 (H) 65 - 99 mg/dL    BUN 10 8 - 23 mg/dL    Creatinine 0.67 0.57 - 1.00 mg/dL    Sodium 137 136 - 145 mmol/L    Potassium 3.8 3.5 - 5.2 mmol/L    Chloride 101 98 - 107 mmol/L    CO2 21.0 (L) 22.0 - 29.0 mmol/L    Calcium 8.7 8.6 - 10.5 mg/dL    Total Protein 8.2 6.0 - 8.5 g/dL    Albumin 4.2 3.5 - 5.2 g/dL    ALT (SGPT) 43 (H) 1 - 33 U/L    AST (SGOT) 67 (H) 1 - 32 U/L    Alkaline Phosphatase 101 39 - 117 U/L    Total Bilirubin 0.7 0.0 - 1.2 mg/dL    Globulin 4.0 gm/dL    A/G Ratio 1.1 g/dL    BUN/Creatinine Ratio 14.9 7.0 - 25.0    Anion Gap 15.0 5.0 - 15.0 mmol/L    eGFR 92.4 >60.0 mL/min/1.73   Protime-INR    Collection Time: 11/27/24 12:05 PM    Specimen: Blood   Result Value Ref Range    Protime 16.2 (H) 11.8 - 14.8 Seconds    INR 1.25 (H) 0.91 - 1.09   aPTT    Collection Time: 11/27/24 12:05 PM    Specimen: Blood   Result Value Ref Range    PTT 34.5 24.5 - 36.0 seconds   Lactic Acid, Plasma    Collection Time: 11/27/24 12:05 PM    Specimen: Blood   Result Value Ref Range    Lactate 2.8 (C) 0.5 - 2.0 mmol/L   Magnesium    Collection Time: 11/27/24 12:05 PM    Specimen: Blood   Result Value Ref Range    Magnesium 1.8 1.6 - 2.4 mg/dL   Sedimentation Rate    Collection Time: 11/27/24 12:05 PM    Specimen: Blood   Result Value Ref Range    Sed Rate 25 0 - 30 mm/hr   C-reactive Protein    Collection Time:  11/27/24 12:05 PM    Specimen: Blood   Result Value Ref Range    C-Reactive Protein 0.81 (H) 0.00 - 0.50 mg/dL   CBC Auto Differential    Collection Time: 11/27/24 12:05 PM    Specimen: Blood   Result Value Ref Range    WBC 6.82 3.40 - 10.80 10*3/mm3    RBC 3.85 3.77 - 5.28 10*6/mm3    Hemoglobin 11.2 (L) 12.0 - 15.9 g/dL    Hematocrit 34.6 34.0 - 46.6 %    MCV 89.9 79.0 - 97.0 fL    MCH 29.1 26.6 - 33.0 pg    MCHC 32.4 31.5 - 35.7 g/dL    RDW 15.8 (H) 12.3 - 15.4 %    RDW-SD 51.0 37.0 - 54.0 fl    MPV 10.2 6.0 - 12.0 fL    Platelets 144 140 - 450 10*3/mm3    Neutrophil % 75.1 42.7 - 76.0 %    Lymphocyte % 14.8 (L) 19.6 - 45.3 %    Monocyte % 8.2 5.0 - 12.0 %    Eosinophil % 1.3 0.3 - 6.2 %    Basophil % 0.3 0.0 - 1.5 %    Immature Grans % 0.3 0.0 - 0.5 %    Neutrophils, Absolute 5.12 1.70 - 7.00 10*3/mm3    Lymphocytes, Absolute 1.01 0.70 - 3.10 10*3/mm3    Monocytes, Absolute 0.56 0.10 - 0.90 10*3/mm3    Eosinophils, Absolute 0.09 0.00 - 0.40 10*3/mm3    Basophils, Absolute 0.02 0.00 - 0.20 10*3/mm3    Immature Grans, Absolute 0.02 0.00 - 0.05 10*3/mm3    nRBC 0.0 0.0 - 0.2 /100 WBC   STAT Lactic Acid, Reflex    Collection Time: 11/27/24  3:06 PM    Specimen: Blood   Result Value Ref Range    Lactate 1.2 0.5 - 2.0 mmol/L          Old charts were reviewed per [facility] EMR.  Pertinent details are summarized above.  All laboratory, radiologic, and EKG studies that were performed in the Emergency Department were a necessary part of the evaluation needed to exclude unstable or  emergent medical conditions.     Patient was hemodynamically and neurologically stable in the ED.   Pertinent studies were reviewed as above.     The patient received:  Medications   sodium chloride 0.9 % flush 10 mL (has no administration in time range)   sodium chloride 0.9 % flush 10 mL (has no administration in time range)   methylPREDNISolone sodium succinate (SOLU-Medrol) injection 125 mg (125 mg Intravenous Given 11/27/24 1340)    diphenhydrAMINE (BENADRYL) injection 50 mg (50 mg Intravenous Given 11/27/24 1339)   iopamidol (ISOVUE-370) 76 % injection 100 mL (100 mL Intravenous Given 11/27/24 1417)            Diagnoses that have been ruled out:   None   Diagnoses that are still under consideration:   None   Final diagnoses:   Rectal bleeding   Gastric varices   Esophageal varices without bleeding, unspecified esophageal varices type   Other cirrhosis of liver   Sigmoid diverticulosis        FINAL IMPRESSION   Diagnosis Plan   1. Rectal bleeding        2. Gastric varices        3. Esophageal varices without bleeding, unspecified esophageal varices type        4. Other cirrhosis of liver        5. Sigmoid diverticulosis              Jenaro Tan Jr, MD        ED Disposition       ED Disposition   Decision to Admit    Condition   --    Comment   Level of Care: Med/Surg [1]   Diagnosis: GI bleeding [142102]   Admitting Physician: ADONAY CASILLAS [[id number]]   Attending Physician: ADONAY CASILLAS [[id number]]                   Dragon disclaimer:  Part of this note may be an electronic transcription/translation of spoken language to printed text using the Dragon Dictation System.     I have reviewed the patient’s prescription history via a prescription monitoring program.  This information is consistent with my knowledge of the patient’s controlled substance use history.        Jenaro Tan Jr., MD  11/27/24 3941

## 2024-11-28 ENCOUNTER — ANESTHESIA EVENT (OUTPATIENT)
Dept: PERIOP | Facility: HOSPITAL | Age: 74
End: 2024-11-28
Payer: MEDICARE

## 2024-11-28 ENCOUNTER — ANESTHESIA (OUTPATIENT)
Dept: PERIOP | Facility: HOSPITAL | Age: 74
End: 2024-11-28
Payer: MEDICARE

## 2024-11-28 PROBLEM — I81 PORTAL VEIN THROMBOSIS: Status: ACTIVE | Noted: 2024-11-28

## 2024-11-28 PROBLEM — D64.9 NORMOCYTIC ANEMIA: Status: ACTIVE | Noted: 2024-11-28

## 2024-11-28 PROBLEM — K74.60 LIVER CIRRHOSIS: Status: ACTIVE | Noted: 2024-11-28

## 2024-11-28 LAB
ALBUMIN SERPL-MCNC: 4.1 G/DL (ref 3.5–5.2)
ALBUMIN/GLOB SERPL: 1.1 G/DL
ALP SERPL-CCNC: 76 U/L (ref 39–117)
ALT SERPL W P-5'-P-CCNC: 42 U/L (ref 1–33)
ANION GAP SERPL CALCULATED.3IONS-SCNC: 14 MMOL/L (ref 5–15)
AST SERPL-CCNC: 64 U/L (ref 1–32)
BILIRUB SERPL-MCNC: 0.8 MG/DL (ref 0–1.2)
BUN SERPL-MCNC: 14 MG/DL (ref 8–23)
BUN/CREAT SERPL: 21.5 (ref 7–25)
CALCIUM SPEC-SCNC: 8.9 MG/DL (ref 8.6–10.5)
CHLORIDE SERPL-SCNC: 102 MMOL/L (ref 98–107)
CO2 SERPL-SCNC: 21 MMOL/L (ref 22–29)
CREAT SERPL-MCNC: 0.65 MG/DL (ref 0.57–1)
DEPRECATED RDW RBC AUTO: 51 FL (ref 37–54)
EGFRCR SERPLBLD CKD-EPI 2021: 93.1 ML/MIN/1.73
ERYTHROCYTE [DISTWIDTH] IN BLOOD BY AUTOMATED COUNT: 15.6 % (ref 12.3–15.4)
GLOBULIN UR ELPH-MCNC: 3.8 GM/DL
GLUCOSE SERPL-MCNC: 148 MG/DL (ref 65–99)
HCT VFR BLD AUTO: 32 % (ref 34–46.6)
HCT VFR BLD AUTO: 33.7 % (ref 34–46.6)
HGB BLD-MCNC: 10.3 G/DL (ref 12–15.9)
HGB BLD-MCNC: 10.6 G/DL (ref 12–15.9)
MCH RBC QN AUTO: 28.6 PG (ref 26.6–33)
MCHC RBC AUTO-ENTMCNC: 31.5 G/DL (ref 31.5–35.7)
MCV RBC AUTO: 91.1 FL (ref 79–97)
PLATELET # BLD AUTO: 115 10*3/MM3 (ref 140–450)
PMV BLD AUTO: 10.7 FL (ref 6–12)
POTASSIUM SERPL-SCNC: 3.8 MMOL/L (ref 3.5–5.2)
PROT SERPL-MCNC: 7.9 G/DL (ref 6–8.5)
RBC # BLD AUTO: 3.7 10*6/MM3 (ref 3.77–5.28)
SODIUM SERPL-SCNC: 137 MMOL/L (ref 136–145)
WBC NRBC COR # BLD AUTO: 6.72 10*3/MM3 (ref 3.4–10.8)

## 2024-11-28 PROCEDURE — G0378 HOSPITAL OBSERVATION PER HR: HCPCS

## 2024-11-28 PROCEDURE — 25010000002 ONDANSETRON PER 1 MG: Performed by: NURSE ANESTHETIST, CERTIFIED REGISTERED

## 2024-11-28 PROCEDURE — 80053 COMPREHEN METABOLIC PANEL: CPT | Performed by: STUDENT IN AN ORGANIZED HEALTH CARE EDUCATION/TRAINING PROGRAM

## 2024-11-28 PROCEDURE — 25010000002 PROPOFOL 10 MG/ML EMULSION: Performed by: NURSE ANESTHETIST, CERTIFIED REGISTERED

## 2024-11-28 PROCEDURE — 06L38CZ OCCLUSION OF ESOPHAGEAL VEIN WITH EXTRALUMINAL DEVICE, VIA NATURAL OR ARTIFICIAL OPENING ENDOSCOPIC: ICD-10-PCS | Performed by: INTERNAL MEDICINE

## 2024-11-28 PROCEDURE — 25010000002 GLYCOPYRROLATE 0.4 MG/2ML SOLUTION: Performed by: NURSE ANESTHETIST, CERTIFIED REGISTERED

## 2024-11-28 PROCEDURE — 99222 1ST HOSP IP/OBS MODERATE 55: CPT | Performed by: INTERNAL MEDICINE

## 2024-11-28 PROCEDURE — 85014 HEMATOCRIT: CPT | Performed by: NURSE PRACTITIONER

## 2024-11-28 PROCEDURE — 25010000002 CEFTRIAXONE PER 250 MG: Performed by: INTERNAL MEDICINE

## 2024-11-28 PROCEDURE — 25010000002 LIDOCAINE PF 2% 2 % SOLUTION: Performed by: NURSE ANESTHETIST, CERTIFIED REGISTERED

## 2024-11-28 PROCEDURE — 85018 HEMOGLOBIN: CPT | Performed by: NURSE PRACTITIONER

## 2024-11-28 PROCEDURE — 43244 EGD VARICES LIGATION: CPT | Performed by: INTERNAL MEDICINE

## 2024-11-28 PROCEDURE — 63710000001 DIPHENHYDRAMINE PER 50 MG: Performed by: NURSE PRACTITIONER

## 2024-11-28 PROCEDURE — 85027 COMPLETE CBC AUTOMATED: CPT | Performed by: STUDENT IN AN ORGANIZED HEALTH CARE EDUCATION/TRAINING PROGRAM

## 2024-11-28 PROCEDURE — 63710000001 DIPHENHYDRAMINE PER 50 MG: Performed by: STUDENT IN AN ORGANIZED HEALTH CARE EDUCATION/TRAINING PROGRAM

## 2024-11-28 PROCEDURE — 25010000002 DROPERIDOL PER 5 MG: Performed by: NURSE ANESTHETIST, CERTIFIED REGISTERED

## 2024-11-28 RX ORDER — SODIUM CHLORIDE 9 MG/ML
40 INJECTION, SOLUTION INTRAVENOUS AS NEEDED
Status: CANCELLED | OUTPATIENT
Start: 2024-11-28

## 2024-11-28 RX ORDER — ROCURONIUM BROMIDE 10 MG/ML
INJECTION, SOLUTION INTRAVENOUS AS NEEDED
Status: DISCONTINUED | OUTPATIENT
Start: 2024-11-28 | End: 2024-11-28 | Stop reason: SURG

## 2024-11-28 RX ORDER — DEXMEDETOMIDINE HYDROCHLORIDE 4 UG/ML
INJECTION, SOLUTION INTRAVENOUS AS NEEDED
Status: DISCONTINUED | OUTPATIENT
Start: 2024-11-28 | End: 2024-11-28 | Stop reason: SURG

## 2024-11-28 RX ORDER — DROPERIDOL 2.5 MG/ML
INJECTION, SOLUTION INTRAMUSCULAR; INTRAVENOUS AS NEEDED
Status: DISCONTINUED | OUTPATIENT
Start: 2024-11-28 | End: 2024-11-28 | Stop reason: SURG

## 2024-11-28 RX ORDER — NEOSTIGMINE METHYLSULFATE 5 MG/5 ML
SYRINGE (ML) INTRAVENOUS AS NEEDED
Status: DISCONTINUED | OUTPATIENT
Start: 2024-11-28 | End: 2024-11-28 | Stop reason: SURG

## 2024-11-28 RX ORDER — GLYCOPYRROLATE 0.2 MG/ML
INJECTION INTRAMUSCULAR; INTRAVENOUS AS NEEDED
Status: DISCONTINUED | OUTPATIENT
Start: 2024-11-28 | End: 2024-11-28 | Stop reason: SURG

## 2024-11-28 RX ORDER — LIDOCAINE HYDROCHLORIDE 20 MG/ML
INJECTION, SOLUTION EPIDURAL; INFILTRATION; INTRACAUDAL; PERINEURAL AS NEEDED
Status: DISCONTINUED | OUTPATIENT
Start: 2024-11-28 | End: 2024-11-28 | Stop reason: SURG

## 2024-11-28 RX ORDER — PROPOFOL 10 MG/ML
VIAL (ML) INTRAVENOUS AS NEEDED
Status: DISCONTINUED | OUTPATIENT
Start: 2024-11-28 | End: 2024-11-28 | Stop reason: SURG

## 2024-11-28 RX ORDER — SODIUM CHLORIDE 0.9 % (FLUSH) 0.9 %
10 SYRINGE (ML) INJECTION EVERY 12 HOURS SCHEDULED
Status: CANCELLED | OUTPATIENT
Start: 2024-11-28

## 2024-11-28 RX ORDER — ONDANSETRON 2 MG/ML
INJECTION INTRAMUSCULAR; INTRAVENOUS AS NEEDED
Status: DISCONTINUED | OUTPATIENT
Start: 2024-11-28 | End: 2024-11-28 | Stop reason: SURG

## 2024-11-28 RX ORDER — SODIUM CHLORIDE 0.9 % (FLUSH) 0.9 %
10 SYRINGE (ML) INJECTION AS NEEDED
Status: CANCELLED | OUTPATIENT
Start: 2024-11-28

## 2024-11-28 RX ORDER — SODIUM CHLORIDE 9 MG/ML
100 INJECTION, SOLUTION INTRAVENOUS CONTINUOUS
Status: CANCELLED | OUTPATIENT
Start: 2024-11-28 | End: 2024-12-04

## 2024-11-28 RX ADMIN — SODIUM CHLORIDE 100 ML: 9 INJECTION, SOLUTION INTRAVENOUS at 09:39

## 2024-11-28 RX ADMIN — CEFTRIAXONE SODIUM 2000 MG: 2 INJECTION, POWDER, FOR SOLUTION INTRAMUSCULAR; INTRAVENOUS at 10:53

## 2024-11-28 RX ADMIN — ROCURONIUM BROMIDE 10 MG: 10 INJECTION, SOLUTION INTRAVENOUS at 09:40

## 2024-11-28 RX ADMIN — LEVOTHYROXINE SODIUM 112 MCG: 112 TABLET ORAL at 06:47

## 2024-11-28 RX ADMIN — PROPOFOL 100 MG: 10 INJECTION, EMULSION INTRAVENOUS at 09:40

## 2024-11-28 RX ADMIN — Medication 10 ML: at 20:34

## 2024-11-28 RX ADMIN — DIPHENHYDRAMINE HYDROCHLORIDE, ZINC ACETATE 1 APPLICATION: 2; .1 CREAM TOPICAL at 09:01

## 2024-11-28 RX ADMIN — DROPERIDOL 1.25 MG: 2.5 INJECTION, SOLUTION INTRAMUSCULAR; INTRAVENOUS at 09:44

## 2024-11-28 RX ADMIN — ONDANSETRON 4 MG: 2 INJECTION INTRAMUSCULAR; INTRAVENOUS at 09:39

## 2024-11-28 RX ADMIN — DIPHENHYDRAMINE HYDROCHLORIDE, ZINC ACETATE 1 APPLICATION: 2; .1 CREAM TOPICAL at 02:59

## 2024-11-28 RX ADMIN — DIPHENHYDRAMINE HYDROCHLORIDE 25 MG: 25 CAPSULE ORAL at 09:00

## 2024-11-28 RX ADMIN — Medication 3.5 MG: at 09:53

## 2024-11-28 RX ADMIN — LIDOCAINE HYDROCHLORIDE 200 MG: 20 INJECTION, SOLUTION EPIDURAL; INFILTRATION; INTRACAUDAL; PERINEURAL at 09:40

## 2024-11-28 RX ADMIN — DIPHENHYDRAMINE HYDROCHLORIDE 25 MG: 25 CAPSULE ORAL at 02:56

## 2024-11-28 RX ADMIN — DEXMEDETOMIDINE HYDROCHLORIDE 20 MCG: 4 INJECTION, SOLUTION INTRAVENOUS at 09:39

## 2024-11-28 RX ADMIN — OXYBUTYNIN CHLORIDE 10 MG: 5 TABLET, EXTENDED RELEASE ORAL at 08:12

## 2024-11-28 RX ADMIN — DIPHENHYDRAMINE HYDROCHLORIDE 25 MG: 25 CAPSULE ORAL at 19:01

## 2024-11-28 RX ADMIN — GLYCOPYRROLATE 0.4 MG: 0.2 INJECTION INTRAMUSCULAR; INTRAVENOUS at 09:52

## 2024-11-28 NOTE — ANESTHESIA POSTPROCEDURE EVALUATION
"Patient: Reina Palomares    Procedure Summary       Date: 11/28/24 Room / Location:  [facility] 02 /  [facility]    Anesthesia Start: 0939 Anesthesia Stop: 1001    Procedure: ESOPHAGOGASTRODUODENOSCOPY WITH ANESTHESIA Diagnosis:     Surgeons: Chandrika Oconnell MD Provider: Juancho Bhardwaj CRNA    Anesthesia Type: general ASA Status: 3 - Emergent            Anesthesia Type: general    Vitals  Vitals Value Taken Time   /63 11/28/24 1000   Temp 97.2 °F (36.2 °C) 11/28/24 0957   Pulse 97 11/28/24 1001   Resp 16 11/28/24 0957   SpO2 95 % 11/28/24 1001   Vitals shown include unfiled device data.        Post Anesthesia Care and Evaluation    Patient location during evaluation: PACU  Patient participation: complete - patient participated  Level of consciousness: awake and alert  Pain management: adequate    Airway patency: patent  Anesthetic complications: No anesthetic complications    Cardiovascular status: acceptable  Respiratory status: acceptable  Hydration status: acceptable    Comments: Blood pressure 143/63, pulse 96, temperature 97.2 °F (36.2 °C), resp. rate 16, height 162.6 cm (64\"), weight 90.3 kg (199 lb), SpO2 95%.    Pt discharged from PACU based on pantera score >8    "

## 2024-11-28 NOTE — PROGRESS NOTES
GI postprocedure note    Patient presented with bright red blood per rectum x 1 which sounds like perianal pathology but then developed an episode of black stool at 3 AM this morning.  Given her history of cirrhosis and esophagogastric varices, proceed with EGD with findings of at least 2 columns of grade 2 esophageal varices with multiple red octaviano signs.  3 bands were placed successfully.  No active bleeding noted.  She also had severe portal hypertensive gastropathy and there was a small amount of fresh blood and hematin in the stomach which may be the source of the melena but I cannot clearly rule out an esophageal variceal hemorrhage especially given drop in hemoglobin from admission hemoglobin 11.2 with a baseline between 12 and 14 and dropped to 10.6 this morning.  Platelets and INR are at an acceptable level.  Her Eliquis is on hold.    I recommend IV antibiotics at least during the duration of her hospitalization with conversion to p.o. antibiotics when discharged home to complete a 7-day course.  I recommend holding Eliquis for at least 3 to 5 days with institution of PPI p.o. twice daily x 8 weeks to heal any post band ulcers and prevent any bleeding.    She told me that her beta-blocker was stopped given increased work of breathing/shortness of air because of her COPD.  Noted a heart rate in the 90s sometimes.  She may benefit from reinstitution of nonselective beta-blocker, consider carvedilol 3.125 mg p.o. twice daily and titrate for heart rate 50-60.    I rotate off the service Friday, 11/29/2024 at 0700 hrs.  Dr. Carbone and Lashay Cox will take over her care.

## 2024-11-28 NOTE — ANESTHESIA PREPROCEDURE EVALUATION
Anesthesia Evaluation     Patient summary reviewed and Nursing notes reviewed   NPO Solid Status: > 8 hours  NPO Liquid Status: > 8 hours           Airway   Mallampati: I  TM distance: >3 FB  No difficulty expected  Dental - normal exam     Pulmonary - normal exam   (+) a smoker Current, Abstained day of surgery, cigarettes, lung cancer, COPD moderate,  Cardiovascular - normal exam  Exercise tolerance: poor (<4 METS)    PT is on anticoagulation therapy        Neuro/Psych  (+) psychiatric history Anxiety and Depression  GI/Hepatic/Renal/Endo    (+) GERD well controlled, GI bleeding upper , hepatitis C, liver disease cirrhosis, thyroid problem hypothyroidism    Musculoskeletal     (+) arthralgias  Abdominal  - normal exam   Substance History      OB/GYN          Other   arthritis,   history of cancer remission                Anesthesia Plan    ASA 3 - emergent     general     intravenous induction     Anesthetic plan, risks, benefits, and alternatives have been provided, discussed and informed consent has been obtained with: patient.    CODE STATUS:    Level Of Support Discussed With: Patient  Code Status (Patient has no pulse and is not breathing): No CPR (Do Not Attempt to Resuscitate)  Medical Interventions (Patient has pulse or is breathing): Full Support

## 2024-11-28 NOTE — PLAN OF CARE
"Goal Outcome Evaluation:  Plan of Care Reviewed With: patient        Progress: no change  Outcome Evaluation: VSS. Pt reported dark \"almost black BM\" this am. Up with minimal assist in room, safety maintained. NPO except sips with meds.                             "

## 2024-11-28 NOTE — ANESTHESIA PROCEDURE NOTES
Airway  Urgency: elective    Date/Time: 11/28/2024 9:40 AM  Airway not difficult    General Information and Staff    Patient location during procedure: OR  CRNA/CAA: Juancho Bhardwaj CRNA    Indications and Patient Condition  Indications for airway management: airway protection    Preoxygenated: yes  MILS maintained throughout  Mask difficulty assessment: 1 - vent by mask    Final Airway Details  Final airway type: endotracheal airway      Successful airway: ETT  Cuffed: yes   Successful intubation technique: direct laryngoscopy  Endotracheal tube insertion site: oral  Blade: Medley  Blade size: 2  ETT size (mm): 7.0  Cormack-Lehane Classification: grade I - full view of glottis  Placement verified by: chest auscultation and capnometry   Cuff volume (mL): 5  Measured from: lips  ETT/EBT  to lips (cm): 20  Number of attempts at approach: 1  Assessment: lips, teeth, and gum same as pre-op and atraumatic intubation

## 2024-11-28 NOTE — PROGRESS NOTES
[facility]  INPATIENT PROGRESS NOTE    Patient Name: Reina Palomares  Date of Admission: 11/27/2024  Today's Date: 11/28/24  Length of Stay: 0  Primary Care Physician: Dash Melendez MD    Subjective   Chief Complaint: none offered  HPI   She was sitting up in bed.  She had just returned from endoscopy by Dr. Oconnell - findings of at least 2 columns of grade 2 esophageal varices with multiple red octaviano signs; 3 bands were placed successfully.  Hemoglobin this afternoon 10.3.  No signs of bleeding.    Review of Systems   All pertinent negatives and positives are as above. All other systems have been reviewed and are negative unless otherwise stated.     Objective    Temp:  [97.6 °F (36.4 °C)-99.2 °F (37.3 °C)] 98.1 °F (36.7 °C)  Heart Rate:  [76-93] 76  Resp:  [16-18] 16  BP: ()/(50-82) 99/82  Physical Exam  Vitals reviewed.   Constitutional:       General: She is not in acute distress.     Appearance: She is obese. She is not toxic-appearing.   HENT:      Head: Normocephalic and atraumatic.      Mouth/Throat:      Mouth: Mucous membranes are moist.      Pharynx: Oropharynx is clear.   Eyes:      Extraocular Movements: Extraocular movements intact.      Conjunctiva/sclera: Conjunctivae normal.      Pupils: Pupils are equal, round, and reactive to light.   Cardiovascular:      Rate and Rhythm: Normal rate and regular rhythm.      Pulses: Normal pulses.   Pulmonary:      Effort: Pulmonary effort is normal. No respiratory distress.      Breath sounds: Normal breath sounds.   Abdominal:      General: Bowel sounds are normal. There is no distension.      Palpations: Abdomen is soft.      Tenderness: There is no abdominal tenderness.   Musculoskeletal:         General: No swelling or tenderness. Normal range of motion.      Cervical back: Normal range of motion and neck supple. No muscular tenderness.   Skin:     General: Skin is warm and dry.      Findings: Rash  "(erythematous rash on arms, legs, back) present.   Neurological:      General: No focal deficit present.      Mental Status: She is alert and oriented to person, place, and time.      Cranial Nerves: No cranial nerve deficit.      Motor: No weakness.   Psychiatric:         Mood and Affect: Mood normal.         Behavior: Behavior normal.       Results Review:  I have reviewed the labs, radiology results, and diagnostic studies.    Laboratory Data:   Results from last 7 days   Lab Units 11/28/24  0511 11/27/24  1205   WBC 10*3/mm3 6.72 6.82   HEMOGLOBIN g/dL 10.6* 11.2*   HEMATOCRIT % 33.7* 34.6   PLATELETS 10*3/mm3 115* 144        Results from last 7 days   Lab Units 11/28/24  0511 11/27/24  1205   SODIUM mmol/L 137 137   POTASSIUM mmol/L 3.8 3.8   CHLORIDE mmol/L 102 101   CO2 mmol/L 21.0* 21.0*   BUN mg/dL 14 10   CREATININE mg/dL 0.65 0.67   CALCIUM mg/dL 8.9 8.7   BILIRUBIN mg/dL 0.8 0.7   ALK PHOS U/L 76 101   ALT (SGPT) U/L 42* 43*   AST (SGOT) U/L 64* 67*   GLUCOSE mg/dL 148* 134*       Culture Data:   No results found for: \"ACANTHNAEG\", \"AFBCX\", \"BPERTUSSISCX\", \"BLOODCX\"  No results found for: \"BCIDPCR\", \"CXREFLEX\", \"CSFCX\", \"CULTURETIS\"  No results found for: \"CULTURES\", \"HSVCX\", \"URCX\"  No results found for: \"EYECULTURE\", \"GCCX\", \"HSVCULTURE\", \"LABHSV\"  No results found for: \"LEGIONELLA\", \"MRSACX\", \"MUMPSCX\", \"MYCOPLASCX\"  No results found for: \"NOCARDIACX\", \"STOOLCX\"  No results found for: \"THROATCX\", \"UNSTIMCULT\", \"URINECX\", \"CULTURE\", \"VZVCULTUR\"  No results found for: \"VIRALCULTU\", \"WOUNDCX\"    Radiology Data:   Imaging Results (Last 24 Hours)       Procedure Component Value Units Date/Time    CT Angiogram Abdomen Pelvis [[id number]] Collected: 11/27/24 1427     Updated: 11/27/24 1440    Narrative:      CT ANGIOGRAM ABDOMEN PELVIS- 11/27/2024 1:07 PM     HISTORY: Bright red blood per rectum     COMPARISON: Abdominal CT dated 12/25/2023     DOSE LENGTH PRODUCT: 1602.05 mGy.cm. Automated exposure control " was also  utilized to decrease patient radiation dose.     TECHNIQUE: Helical tomographic images of the abdomen and pelvis  utilizing angiographic protocol were obtained following the intravenous  infusion of contrast. Multiplanar and 3 D reformatted images were  provided for review.     FINDINGS:     ANGIOGRAM:     Abdominal aorta is normal in caliber. Moderate atheromatous  calcification along the abdominal aorta. No dissection or flow-limiting  stenosis. No flow-limiting stenosis at the origin of the celiac or  superior mesenteric arteries. CECE is patent. No flow-limiting stenosis  at the renal artery origins.     Calcified atheromatous plaque along the common iliac arteries. Mild  atheromatous narrowing along the left common iliac artery. Moderate to  high-grade atheromatous narrowing at the origin of the left internal  iliac artery. External iliac arteries are patent and normal in caliber  with no flow-limiting stenosis. Included portions of the bilateral  superficial and deep femoral arteries are patent.        OTHER FINDINGS:     LOWER CHEST: Previous pneumonectomy on the left with right to left  mediastinal shift. No consolidation of the right lung base.     LIVER: Liver surface contour appears slightly nodular. There may be a  recanalized periumbilical vein as well. The main portal vein appears  dilated. No focal lesion identified. Hepatic veins are patent.     BILIARY SYSTEM: Prior cholecystectomy. Biliary tree is nondilated..     PANCREAS: No focal lesion. Main duct is nondilated.     SPLEEN: Enlarged, measuring 17.4 cm craniocaudal.     KIDNEYS AND ADRENALS: Adrenal glands are unremarkable. Kidneys are  unremarkable. The ureters are decompressed and normal in appearance.     RETROPERITONEUM: No mass, lymphadenopathy or hemorrhage.     GI TRACT: Lower paraesophageal and gastric varices. No arterial contrast  extravasation or delayed contrast pooling. Small bowel loops are  nondilated.  No inflammatory  changes are seen along the colon. Fairly  extensive colonic diverticulosis. No acute diverticulitis. Again, there  is no active extravasation or intraluminal contrast pooling identified  in the colon.     OTHER: There is no mesenteric mass, lymphadenopathy, free fluid or free  air. No acute bony abnormality. Lumbar facet arthropathy.     PELVIS: No mass lesion, fluid collection or significant lymphadenopathy  is seen in the pelvis. The urinary bladder is normal in appearance.       Impression:      1. No arterial contrast extravasation or delayed intraluminal contrast  pooling to suggest active brisk GI bleed.  2. Exam is concerning for liver cirrhosis with sequelae of portal  hypertension including splenomegaly and lower esophageal and gastric  varices, a potential source for bleed although no active bleed is seen.  3. Fairly extensive diverticulosis along the sigmoid colon as well which  could be a potential source for bleed although no active bleed is seen.  No acute diverticulitis.        This report was signed and finalized on 11/27/2024 2:36 PM by Dr Raheel Kate.               I have reviewed the patient's current medications.     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  She presented to the ER on 11/27 with complaints of bright red blood per rectum.  She had a bowel movement which most mostly blood and very little stool and then also found a spot of blood in her bed when she woke up.  She did not have any repeated episodes of bleeding in the ER.   She denies shortness of breath, chest pain, nausea or vomiting.  Additionally she also reports a skin rash.  She states that she thinks she had a bug bite in her left lower leg in May and then eventually developed blisters in her left ankle for which she was seen by her primary care doc via telehealth visit and was prescribed valacyclovir and Bactrim that she started 11/21.  For the past few days she has developed itching and erythema on her 4 extremities and some on her torso as well. Hospital medicine services asked to admit by ER physician after case discussed with case with GI physician who recommends observation. In the ER, patient hemodynamically stable and hemoglobin 11.    Treatment Plan  GI bleed. CT angio of the abdomen: No arterial contrast extravasation to suggest active brisk GI bleed.  Liver cirrhosis with sequela of portal hypertension, splenomegaly, lower esophageal and gastric varices.  Extensive diverticulosis of sigmoid colon.  Gastroenterology, Dr. 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 Eliquis on hold with last dose 11/26.     Pruritic rash  She was recently diagnosed with shingles on a telehealth visit and prescribed valacyclovir and Bactrim. Patient's feels as if she is allergic to one of those medicines. As needed Benadryl for pruritus. Diphenhydramine-zinc cream.     Hypothyroidism - continue Synthroid 112 mcg    PAD - follows with Dr. Mathews vascular surgery    History of lung cancer s/p left pneumonectomy -continue 2 L of oxygen at night    SCDs for DVT prophylaxis.    Medical Decision Making  Number and Complexity of problems: 3 acute problems  Differential Diagnosis: As above    Conditions and Status        Condition is unchanged.     [facility] Data  External documents reviewed: prior [facility] records  Cardiac tracing (EKG, telemetry) interpretation: None  Radiology interpretation:  Interpreted by radiology  Labs reviewed: As above  Any tests that were considered but not ordered: none considered at present     Decision rules/scores evaluated (example [id number], Wells, etc): none considered at present     Discussed with: Patient and Dr. Castro     Care Planning  Shared decision making: Patient is agreeable to ongoing workup and treatment  Code status and discussions: No CPR with full interventions    Disposition  Social Determinants of Health that impact treatment or disposition: None  I expect the patient to be discharged to home in 1-2 days.     Electronically signed by TRACEY Jovel, 11/28/24, 08:37 CST.

## 2024-11-28 NOTE — CONSULTS
"11/28/24  Reina Palomares   1950    DATE OF ADMISSION:   LENGTH OF STAY:  11/27/2024     0 days    Consultation Regarding:     Evaluation and/or management for \"rectal bleeding\"    REQUESTING PHYSICIAN:  Parker Castro MD    HPI:     Reina Palomares is an 74 y.o. female with past medical history that includes  compensated cirrhosis 2nd to chronic HCV which has been treated to sustained viral response with Harvoni last year, known esophagogastric varices with previous repeated banding but no history of upper GI bleeding, last EGD January or April 2024, no HE or ascites, h/o PVT on Eliquis, (followed closely at [place] [facility] department) h/o lung CA s/p pneumonectomy 2008,  and GERD who presented around noon 11/27/24 after an episode of BRBPR after a bowel movement 11/26/24 and then after waking up with a \"spot of blood\" seen in bedsheets or underwear yesterday morning.    Has been HD stable on presentation and throughout hospital course.  Hgb baseline 12-14 and admit Hgb 11.2 with plts 144, INR 1.25.      Hgb 11.2 11/27 at 1200, not repeated until this am at 0510, now 10.6, plts 115.  Last dose of eliquis 11/26/2024.    Patient tells me that she had bright red blood seen on toilet paper after bowel movement and then when she stood up from the toilet, had some drops of bright red blood.  Early this morning around 230 or 3 AM, she had a formed black bowel movement. Eliquis was not reversed but held, denies constipation, diarrhea, abd pain or rectal pain.     I saw this patient in the preop area of endoscopy before EGD this morning.    Past Medical History:   Diagnosis Date    Anxiety     Arthritis     Blood clot in abdominal vein     Cancer     LUNG    Cirrhosis     states had blood clot neer liver and was told she had cirrhosis    GERD (gastroesophageal reflux disease)     Hyperthyroidism     Infectious viral hepatitis     H/O HEPATITIS C    Swelling of lower extremity     Varices, esophageal  "       Past Surgical History:   Procedure Laterality Date    CARDIAC CATHETERIZATION      CHOLECYSTECTOMY  2020    [facility]    CLAVICLE SURGERY Right     COLONOSCOPY      HYSTERECTOMY      TOTAL ABDOMINAL HYSTERECTOMY    KNEE ARTHROSCOPY Bilateral     LUNG REMOVAL, TOTAL Left     REPLACEMENT TOTAL KNEE Left     THYROID BIOPSY      BENIGN    TUBAL ABDOMINAL LIGATION      X2    VARICOSE VEIN SURGERY Left 9/6/2024    Procedure: LEFT LOWER EXTREMITY RADIO FREQUENCY ABLATION;  Surgeon: Elan Mathews DO;  Location:  [facility];  Service: Vascular;  Laterality: Left;       Allergies:   Dilaudid [hydromorphone hcl]    Prior to Admission medications    Medication Sig Start Date End Date Taking? Authorizing Provider   apixaban (ELIQUIS) 2.5 MG tablet tablet Take 1 tablet by mouth Every 12 (Twelve) Hours. 11/17/23  Yes Leticia Deshpande MD   levothyroxine (SYNTHROID, LEVOTHROID) 112 MCG tablet Take 1 tablet by mouth Daily.   Yes Leticia Deshpande MD   loratadine (CLARITIN) 10 MG tablet Take 1 tablet by mouth Daily.   Yes Leticia Deshpande MD   oxybutynin XL (DITROPAN-XL) 10 MG 24 hr tablet Take 1 tablet by mouth Daily.   Yes Leticia Deshpande MD   tiotropium bromide-olodaterol (Stiolto Respimat) 2.5-2.5 MCG/ACT aerosol solution inhaler Inhale 2 puffs Daily.  Patient taking differently: Inhale 2 puffs Daily As Needed (shortness of air).   Yes Leticia Deshpande MD   TiZANidine (ZANAFLEX) 2 MG capsule Take 1 capsule by mouth Every 12 (Twelve) Hours As Needed for Muscle Spasms.    Leticia Deshpande MD   levothyroxine (SYNTHROID, LEVOTHROID) 88 MCG tablet Take 1 tablet by mouth Daily.  11/27/24  Leticia Deshpande MD   propranolol (INDERAL) 20 MG tablet Take 0.5 tablets by mouth 2 (Two) Times a Day.  Patient not taking: Reported on 11/27/2024 11/27/24  Leticia Deshpande MD   tolterodine LA (DETROL LA) 4 MG 24 hr capsule Take 1 capsule by mouth Daily.  Patient not taking: Reported on  11/27/2024 11/27/24  ProviderLeticia MD   traMADol (ULTRAM) 50 MG tablet Take 1 tablet by mouth Every 6 (Six) Hours As Needed for Moderate Pain.  Patient not taking: Reported on 11/27/2024 9/6/24 11/27/24  Elan Mathews DO   trospium (SANCTURA) 20 MG tablet Take 1 tablet by mouth Daily.  Patient not taking: Reported on 11/27/2024 3/22/24 11/27/24  ProviderLeticia MD      Current Facility-Administered Medications   Medication Dose Route Frequency Provider Last Rate Last Admin    acetaminophen (TYLENOL) tablet 650 mg  650 mg Oral Q4H PRN Parker Castro MD        Or    acetaminophen (TYLENOL) 160 MG/5ML oral solution 650 mg  650 mg Oral Q4H PRN Parker Castro MD        Or    acetaminophen (TYLENOL) suppository 650 mg  650 mg Rectal Q4H PRN Parker Castro MD        [Held by provider] apixaban (ELIQUIS) tablet 2.5 mg  2.5 mg Oral Q12H Parker Castro MD        sennosides-docusate (PERICOLACE) 8.6-50 MG per tablet 2 tablet  2 tablet Oral BID PRN Parker Castro MD        And    polyethylene glycol (MIRALAX) packet 17 g  17 g Oral Daily PRN Parker Castro MD        And    bisacodyl (DULCOLAX) EC tablet 5 mg  5 mg Oral Daily PRN Parker Castro MD        And    bisacodyl (DULCOLAX) suppository 10 mg  10 mg Rectal Daily PRN Parker Castro MD        Calcium Replacement - Follow Nurse / BPA Driven Protocol   Not Applicable PRN Parker Castro MD        [Transfer Hold] diphenhydrAMINE (BENADRYL) capsule 25 mg  25 mg Oral Q6H PRN Parker Castro MD   25 mg at 11/28/24 0900    [Transfer Hold] diphenhydrAMINE-zinc acetate 2-0.1 % cream 1 Application  1 Application Topical TID PRN Parker Castro MD   1 Application at 11/28/24 0901    levothyroxine (SYNTHROID, LEVOTHROID) tablet 112 mcg  112 mcg Oral Q AM Parker Castro MD   112 mcg at 11/28/24 0647    Magnesium Standard Dose Replacement - Follow Nurse / BPA Driven Protocol   Not Applicable PRN Matthew  MD Parker        nitroglycerin (NITROSTAT) SL tablet 0.4 mg  0.4 mg Sublingual Q5 Min PRN Parker Castro MD        oxybutynin XL (DITROPAN-XL) 24 hr tablet 10 mg  10 mg Oral Daily Parker Castro MD   10 mg at 11/28/24 0812    Phosphorus Replacement - Follow Nurse / BPA Driven Protocol   Not Applicable Parker Cruz MD        Potassium Replacement - Follow Nurse / BPA Driven Protocol   Not Applicable Parker Cruz MD        sodium chloride 0.9 % flush 10 mL  10 mL Intravenous PRN Parker Castro MD        sodium chloride 0.9 % flush 10 mL  10 mL Intravenous PRN Parker Castro MD        sodium chloride 0.9 % flush 10 mL  10 mL Intravenous Q12H Parker Castro MD   10 mL at 11/27/24 2028    sodium chloride 0.9 % flush 10 mL  10 mL Intravenous PRN Parker Castro MD        sodium chloride 0.9 % infusion 40 mL  40 mL Intravenous PRN Parker Castro MD   100 mL at 11/28/24 0939    [Transfer Hold] tiZANidine (ZANAFLEX) tablet 2 mg  2 mg Oral Nightly PRParker Rhodes MD         Facility-Administered Medications Ordered in Other Encounters   Medication Dose Route Frequency Provider Last Rate Last Admin    dexmedetomidine (PRECEDEX) injection   Nasal PRN Juancho Bhardwaj CRNA   20 mcg at 11/28/24 0939    droperidol (INAPSINE) injection   Intravenous PRN Juancho Bhardwaj CRNA   1.25 mg at 11/28/24 0944    glycopyrrolate (ROBINUL) injection   Intravenous PRN Juancho Bhardwaj CRNA   0.4 mg at 11/28/24 0952    lidocaine PF 2% (XYLOCAINE) injection   Intravenous PRN Juancho Bhardwaj CRNA   200 mg at 11/28/24 0940    Neostigmine Methylsulfate syringe   Intravenous PRN Juancho Bhardwaj CRNA   3.5 mg at 11/28/24 0953    ondansetron (ZOFRAN) injection   Intravenous PRN Juancho Bhardwaj CRNA   4 mg at 11/28/24 0939    Propofol (DIPRIVAN) injection   Intravenous PRN Juancho Bhardwaj CRNA   100 mg at 11/28/24 0940    rocuronium (ZEMURON) injection   Intravenous  "Juancho White, CRNA   10 mg at 24 0940        Social History     Socioeconomic History    Marital status:    Tobacco Use    Smoking status: Former     Current packs/day: 0.00     Types: Cigarettes     Quit date:      Years since quittin.9    Smokeless tobacco: Never    Tobacco comments:     LESS THAN 1 PPD FOR 12 YEARS   Vaping Use    Vaping status: Never Used   Substance and Sexual Activity    Alcohol use: Yes     Comment: OCCASIONAL WINE    Drug use: No    Sexual activity: Defer     Partners: Male     Birth control/protection: None       Family History:  family history includes Cancer in her brother and another family member; Coronary artery disease in her brother and another family member; Hyperthyroidism in her sister; Hypothyroidism in her sister; Melanoma in her sister and another family member; Other in her father and mother; Thyroid disease in an other family member.    Review of Systems:   The 12 point review of systems is otherwise unremarkable except for what is stated in the HPI.    Physical Examination:     Vital signs:   BP 99/82 (BP Location: Right arm, Patient Position: Lying)   Pulse 76   Temp 98.1 °F (36.7 °C) (Oral)   Resp 16   Ht 162.6 cm (64\")   Wt 90.3 kg (199 lb)   SpO2 97%   BMI 34.16 kg/m²     General Appearance:  This is a 74 y.o. year old female in no acute distress.  HEENT: Normocephalic, atraumatic, pupils equal, round, reactive to light, Oral cavity clear  Neck: No adenopathy, thyromegaly or mass.   Chest: Good expansion, clear to auscultation bilaterally    Heart: Regular rate and rhythm. No murmurs, gallops or rubs  Abdomen: Soft, non-distended. Normal bowel sounds present throughout.  No tenderness, no guarding or rebound. No hepatosplenomegaly. No hernia.    Extremities: No edema.2+pulses peripherally  Skin: No rash or jaundice. No spider angiomata. No palmar erythema.  Neurologic:  Alert. Normal speech. Oriented. Nonfocal.  Psychiatric:  Normal " "affect.     Diagnostic Studies     Results from last 7 days   Lab Units 11/28/24  0511 11/27/24  1205   WBC 10*3/mm3 6.72 6.82   HEMOGLOBIN g/dL 10.6* 11.2*   HEMATOCRIT % 33.7* 34.6   PLATELETS 10*3/mm3 115* 144     Results from last 7 days   Lab Units 11/28/24  0511 11/27/24  1205   SODIUM mmol/L 137 137   POTASSIUM mmol/L 3.8 3.8   CHLORIDE mmol/L 102 101   CO2 mmol/L 21.0* 21.0*   BUN mg/dL 14 10   CREATININE mg/dL 0.65 0.67   CALCIUM mg/dL 8.9 8.7   BILIRUBIN mg/dL 0.8 0.7   ALK PHOS U/L 76 101   ALT (SGPT) U/L 42* 43*   AST (SGOT) U/L 64* 67*   GLUCOSE mg/dL 148* 134*     Results from last 7 days   Lab Units 11/27/24  1205   INR  1.25*     Lab Results   Lab Value Date/Time    LIPASE 28 12/24/2023 2322       STOOL OCCULT BLOOD  No results found for: \"OCCULTBLD\"     IMAGING:  CT Angiogram Abdomen Pelvis    Result Date: 11/27/2024  Narrative: CT ANGIOGRAM ABDOMEN PELVIS- 11/27/2024 1:07 PM  HISTORY: Bright red blood per rectum  COMPARISON: Abdominal CT dated 12/25/2023  DOSE LENGTH PRODUCT: 1602.05 mGy.cm. Automated exposure control was also utilized to decrease patient radiation dose.  TECHNIQUE: Helical tomographic images of the abdomen and pelvis utilizing angiographic protocol were obtained following the intravenous infusion of contrast. Multiplanar and 3 D reformatted images were provided for review.  FINDINGS:  ANGIOGRAM:  Abdominal aorta is normal in caliber. Moderate atheromatous calcification along the abdominal aorta. No dissection or flow-limiting stenosis. No flow-limiting stenosis at the origin of the celiac or superior mesenteric arteries. CECE is patent. No flow-limiting stenosis at the renal artery origins.  Calcified atheromatous plaque along the common iliac arteries. Mild atheromatous narrowing along the left common iliac artery. Moderate to high-grade atheromatous narrowing at the origin of the left internal iliac artery. External iliac arteries are patent and normal in caliber with no " flow-limiting stenosis. Included portions of the bilateral superficial and deep femoral arteries are patent.   OTHER FINDINGS:  LOWER CHEST: Previous pneumonectomy on the left with right to left mediastinal shift. No consolidation of the right lung base.  LIVER: Liver surface contour appears slightly nodular. There may be a recanalized periumbilical vein as well. The main portal vein appears dilated. No focal lesion identified. Hepatic veins are patent.  BILIARY SYSTEM: Prior cholecystectomy. Biliary tree is nondilated..  PANCREAS: No focal lesion. Main duct is nondilated.  SPLEEN: Enlarged, measuring 17.4 cm craniocaudal.  KIDNEYS AND ADRENALS: Adrenal glands are unremarkable. Kidneys are unremarkable. The ureters are decompressed and normal in appearance.  RETROPERITONEUM: No mass, lymphadenopathy or hemorrhage.  GI TRACT: Lower paraesophageal and gastric varices. No arterial contrast extravasation or delayed contrast pooling. Small bowel loops are nondilated.  No inflammatory changes are seen along the colon. Fairly extensive colonic diverticulosis. No acute diverticulitis. Again, there is no active extravasation or intraluminal contrast pooling identified in the colon.  OTHER: There is no mesenteric mass, lymphadenopathy, free fluid or free air. No acute bony abnormality. Lumbar facet arthropathy.  PELVIS: No mass lesion, fluid collection or significant lymphadenopathy is seen in the pelvis. The urinary bladder is normal in appearance.      Impression: 1. No arterial contrast extravasation or delayed intraluminal contrast pooling to suggest active brisk GI bleed. 2. Exam is concerning for liver cirrhosis with sequelae of portal hypertension including splenomegaly and lower esophageal and gastric varices, a potential source for bleed although no active bleed is seen. 3. Fairly extensive diverticulosis along the sigmoid colon as well which could be a potential source for bleed although no active bleed is seen. No  acute diverticulitis.   This report was signed and finalized on 11/27/2024 2:36 PM by Dr Raheel Kate.        ASSESSMENT/PLAN:   BRBPR x 1 11/26/2024  Melena x 1 3 AM 11/28/2020  Compensated cirrhosis 2nd to HCV (treated to SVR with Harvoni 2023)  Known history of esophagogastric varices with previous repeated EBL but none last EGD Jan or April 2024  Colonic diverticulosis  Portal vein thrombosis  Long term use of Eliquis for #4  GERD    The bright red blood per rectum sounds like perianal pathology however she did have a black stool this morning and given the history of cirrhosis with esophagogastric varices and previous banding, I will proceed with urgent EGD.  She is not on antibiotics currently so we will start that.  I am currently not going to start octreotide given no obvious ongoing significant GI bleeding now.  Hold Eliquis.    RECOMMENDATIONS:   N.p.o.  Urgent EGD  Rocephin 1 g IV every 24 hours    The risk and benefits of the procedure were discussed in detail with the patient.  The risk include but are not limited to cardiorespiratory vents, bleeding, infection, perforation, failed treatment.  All questions answered.  Patient verbalized understanding and is willing to proceed.    Thank you for this referral. Please call with any questions.    Chandrika Oconnell MD, YASMEEN MOLINA  09:57 CST    DISCLAIMER:    This physician works through a [employer] as an inpatient consultant gastroenterologist only and has no outpatient clinic for patient follow up.  Any results not available at time of inpatient discharge and/or GI clinic follow up should be managed by the hospitalist team, PCP, or outpatient gastroenterologist.    Note to Patient:   The 21st Century Cures Act makes medical notes like this available to patients in the interest of transparency; however, please be advised this is a medical document.  It is intended as [id number] communication.  It is written in medical language and may contain  abbreviations or verbiage that are unfamiliar.  It may appear blunt or direct.  Medical documents are intended to carry relevant information, facts as evident, and the clinical opinion of the practitioner.  This note may have been transcribed using a voice dictation system.  Voice-recognition errors may occur.  This should not be taken to alter the content or meaning of this note.

## 2024-11-29 PROBLEM — K92.2 GI BLEED: Status: ACTIVE | Noted: 2024-11-29

## 2024-11-29 LAB
ALBUMIN SERPL-MCNC: 4 G/DL (ref 3.5–5.2)
ALBUMIN/GLOB SERPL: 1.1 G/DL
ALP SERPL-CCNC: 74 U/L (ref 39–117)
ALT SERPL W P-5'-P-CCNC: 58 U/L (ref 1–33)
ANION GAP SERPL CALCULATED.3IONS-SCNC: 8 MMOL/L (ref 5–15)
AST SERPL-CCNC: 112 U/L (ref 1–32)
BILIRUB SERPL-MCNC: 0.6 MG/DL (ref 0–1.2)
BUN SERPL-MCNC: 14 MG/DL (ref 8–23)
BUN/CREAT SERPL: 21.2 (ref 7–25)
CALCIUM SPEC-SCNC: 8.7 MG/DL (ref 8.6–10.5)
CHLORIDE SERPL-SCNC: 99 MMOL/L (ref 98–107)
CO2 SERPL-SCNC: 28 MMOL/L (ref 22–29)
CREAT SERPL-MCNC: 0.66 MG/DL (ref 0.57–1)
DEPRECATED RDW RBC AUTO: 52.6 FL (ref 37–54)
EGFRCR SERPLBLD CKD-EPI 2021: 92.8 ML/MIN/1.73
ERYTHROCYTE [DISTWIDTH] IN BLOOD BY AUTOMATED COUNT: 16.1 % (ref 12.3–15.4)
GLOBULIN UR ELPH-MCNC: 3.7 GM/DL
GLUCOSE SERPL-MCNC: 112 MG/DL (ref 65–99)
HCT VFR BLD AUTO: 33.3 % (ref 34–46.6)
HCT VFR BLD AUTO: 33.4 % (ref 34–46.6)
HGB BLD-MCNC: 10.5 G/DL (ref 12–15.9)
HGB BLD-MCNC: 10.7 G/DL (ref 12–15.9)
MCH RBC QN AUTO: 28.7 PG (ref 26.6–33)
MCHC RBC AUTO-ENTMCNC: 31.5 G/DL (ref 31.5–35.7)
MCV RBC AUTO: 91 FL (ref 79–97)
PLATELET # BLD AUTO: 153 10*3/MM3 (ref 140–450)
PMV BLD AUTO: 10.1 FL (ref 6–12)
POTASSIUM SERPL-SCNC: 3.5 MMOL/L (ref 3.5–5.2)
POTASSIUM SERPL-SCNC: 4.1 MMOL/L (ref 3.5–5.2)
PROT SERPL-MCNC: 7.7 G/DL (ref 6–8.5)
RBC # BLD AUTO: 3.66 10*6/MM3 (ref 3.77–5.28)
SODIUM SERPL-SCNC: 135 MMOL/L (ref 136–145)
WBC NRBC COR # BLD AUTO: 7.4 10*3/MM3 (ref 3.4–10.8)

## 2024-11-29 PROCEDURE — 84132 ASSAY OF SERUM POTASSIUM: CPT | Performed by: STUDENT IN AN ORGANIZED HEALTH CARE EDUCATION/TRAINING PROGRAM

## 2024-11-29 PROCEDURE — 80053 COMPREHEN METABOLIC PANEL: CPT | Performed by: NURSE PRACTITIONER

## 2024-11-29 PROCEDURE — 85018 HEMOGLOBIN: CPT | Performed by: NURSE PRACTITIONER

## 2024-11-29 PROCEDURE — 85014 HEMATOCRIT: CPT | Performed by: NURSE PRACTITIONER

## 2024-11-29 PROCEDURE — 25010000002 CEFTRIAXONE PER 250 MG: Performed by: INTERNAL MEDICINE

## 2024-11-29 PROCEDURE — 85027 COMPLETE CBC AUTOMATED: CPT | Performed by: NURSE PRACTITIONER

## 2024-11-29 PROCEDURE — 63710000001 DIPHENHYDRAMINE PER 50 MG: Performed by: NURSE PRACTITIONER

## 2024-11-29 PROCEDURE — 25010000002 METOCLOPRAMIDE PER 10 MG: Performed by: NURSE PRACTITIONER

## 2024-11-29 PROCEDURE — 99232 SBSQ HOSP IP/OBS MODERATE 35: CPT | Performed by: NURSE PRACTITIONER

## 2024-11-29 PROCEDURE — 25010000002 ONDANSETRON PER 1 MG: Performed by: FAMILY MEDICINE

## 2024-11-29 RX ORDER — CARVEDILOL 3.12 MG/1
3.12 TABLET ORAL 2 TIMES DAILY WITH MEALS
Status: DISCONTINUED | OUTPATIENT
Start: 2024-11-29 | End: 2024-12-02 | Stop reason: HOSPADM

## 2024-11-29 RX ORDER — PANTOPRAZOLE SODIUM 40 MG/1
40 TABLET, DELAYED RELEASE ORAL
Status: DISCONTINUED | OUTPATIENT
Start: 2024-11-29 | End: 2024-12-02 | Stop reason: HOSPADM

## 2024-11-29 RX ORDER — METOCLOPRAMIDE HYDROCHLORIDE 5 MG/ML
10 INJECTION INTRAMUSCULAR; INTRAVENOUS EVERY 6 HOURS
Status: DISCONTINUED | OUTPATIENT
Start: 2024-11-29 | End: 2024-11-30

## 2024-11-29 RX ORDER — MONTELUKAST SODIUM 10 MG/1
10 TABLET ORAL ONCE
Status: COMPLETED | OUTPATIENT
Start: 2024-11-29 | End: 2024-11-29

## 2024-11-29 RX ORDER — ONDANSETRON 2 MG/ML
4 INJECTION INTRAMUSCULAR; INTRAVENOUS EVERY 6 HOURS PRN
Status: DISCONTINUED | OUTPATIENT
Start: 2024-11-29 | End: 2024-12-02 | Stop reason: HOSPADM

## 2024-11-29 RX ORDER — POTASSIUM CHLORIDE 1500 MG/1
40 TABLET, EXTENDED RELEASE ORAL EVERY 4 HOURS
Status: ACTIVE | OUTPATIENT
Start: 2024-11-29 | End: 2024-11-29

## 2024-11-29 RX ORDER — HYDROXYZINE HYDROCHLORIDE 25 MG/1
25 TABLET, FILM COATED ORAL EVERY 4 HOURS PRN
Status: DISCONTINUED | OUTPATIENT
Start: 2024-11-29 | End: 2024-12-02 | Stop reason: HOSPADM

## 2024-11-29 RX ADMIN — ONDANSETRON 4 MG: 2 INJECTION INTRAMUSCULAR; INTRAVENOUS at 03:20

## 2024-11-29 RX ADMIN — CEFTRIAXONE SODIUM 2000 MG: 2 INJECTION, POWDER, FOR SOLUTION INTRAMUSCULAR; INTRAVENOUS at 12:42

## 2024-11-29 RX ADMIN — METOCLOPRAMIDE 10 MG: 5 INJECTION, SOLUTION INTRAMUSCULAR; INTRAVENOUS at 22:33

## 2024-11-29 RX ADMIN — METOCLOPRAMIDE 10 MG: 5 INJECTION, SOLUTION INTRAMUSCULAR; INTRAVENOUS at 18:13

## 2024-11-29 RX ADMIN — HYDROXYZINE HYDROCHLORIDE 25 MG: 25 TABLET ORAL at 02:19

## 2024-11-29 RX ADMIN — METOCLOPRAMIDE 10 MG: 5 INJECTION, SOLUTION INTRAMUSCULAR; INTRAVENOUS at 09:50

## 2024-11-29 RX ADMIN — Medication 10 ML: at 09:55

## 2024-11-29 RX ADMIN — PANTOPRAZOLE SODIUM 40 MG: 40 TABLET, DELAYED RELEASE ORAL at 18:13

## 2024-11-29 RX ADMIN — DIPHENHYDRAMINE HYDROCHLORIDE, ZINC ACETATE 1 APPLICATION: 2; .1 CREAM TOPICAL at 22:33

## 2024-11-29 RX ADMIN — DIPHENHYDRAMINE HYDROCHLORIDE 25 MG: 25 CAPSULE ORAL at 01:09

## 2024-11-29 RX ADMIN — PANTOPRAZOLE SODIUM 40 MG: 40 TABLET, DELAYED RELEASE ORAL at 09:50

## 2024-11-29 RX ADMIN — CARVEDILOL 3.12 MG: 3.12 TABLET, FILM COATED ORAL at 18:13

## 2024-11-29 RX ADMIN — CARVEDILOL 3.12 MG: 3.12 TABLET, FILM COATED ORAL at 09:50

## 2024-11-29 RX ADMIN — MONTELUKAST SODIUM 10 MG: 10 TABLET, COATED ORAL at 02:20

## 2024-11-29 RX ADMIN — Medication 10 ML: at 22:33

## 2024-11-29 RX ADMIN — ACETAMINOPHEN 650 MG: 325 TABLET ORAL at 02:24

## 2024-11-29 RX ADMIN — HYDROXYZINE HYDROCHLORIDE 25 MG: 25 TABLET ORAL at 22:33

## 2024-11-29 NOTE — PROGRESS NOTES
[facility]  Inpatient Progress Note      Date of Admission: 11/27/2024  Date of Service:  11/29/24    Reason for Follow Up: Rectal bleeding    Subjective     Subjective:   Patient lying in bed, no family present at bedside.  EGD yesterday with identification of grade 2 varices.  Varices were banded.  Patient has experienced nausea and vomiting starting after clear liquid dinner last evening.  Zofran has not provided relief.  Patient reports abdominal pain from throwing up.  No signs of rectal bleeding.    Current Facility-Administered Medications:     acetaminophen (TYLENOL) tablet 650 mg, 650 mg, Oral, Q4H PRN, 650 mg at 11/29/24 0224 **OR** acetaminophen (TYLENOL) 160 MG/5ML oral solution 650 mg, 650 mg, Oral, Q4H PRN **OR** acetaminophen (TYLENOL) suppository 650 mg, 650 mg, Rectal, Q4H PRN, Parker Castro MD    [Held by provider] apixaban (ELIQUIS) tablet 2.5 mg, 2.5 mg, Oral, Q12H, Parker Castro MD    sennosides-docusate (PERICOLACE) 8.6-50 MG per tablet 2 tablet, 2 tablet, Oral, BID PRN **AND** polyethylene glycol (MIRALAX) packet 17 g, 17 g, Oral, Daily PRN **AND** bisacodyl (DULCOLAX) EC tablet 5 mg, 5 mg, Oral, Daily PRN **AND** bisacodyl (DULCOLAX) suppository 10 mg, 10 mg, Rectal, Daily PRN, Parker Castro MD    Calcium Replacement - Follow Nurse / BPA Driven Protocol, , Not Applicable, PRN, Parker Castro MD    carvedilol (COREG) tablet 3.125 mg, 3.125 mg, Oral, BID With Meals, Parker Castro MD    cefTRIAXone (ROCEPHIN) 2,000 mg in sodium chloride 0.9 % 100 mL IVPB, 2,000 mg, Intravenous, Q24H, Chandrika Oconnell MD, Last Rate: 200 mL/hr at 11/28/24 1053, 2,000 mg at 11/28/24 1053    diphenhydrAMINE-zinc acetate 2-0.1 % cream 1 Application, 1 Application, Topical, TID PRN, Viji Raimrez APRN, 1 Application at 11/28/24 0901    hydrOXYzine (ATARAX) tablet 25 mg, 25 mg, Oral, Q4H PRN, Seven Garduno MD, 25 mg at 11/29/24 0219    levothyroxine  (SYNTHROID, LEVOTHROID) tablet 112 mcg, 112 mcg, Oral, Q AM, Parker Castro MD, 112 mcg at 11/28/24 0647    Magnesium Standard Dose Replacement - Follow Nurse / BPA Driven Protocol, , Not Applicable, PRN, Parker Castro MD    metoclopramide (REGLAN) injection 10 mg, 10 mg, Intravenous, Q6H, Federico Cox APRN    nitroglycerin (NITROSTAT) SL tablet 0.4 mg, 0.4 mg, Sublingual, Q5 Min PRN, Parker Castro MD    ondansetron (ZOFRAN) injection 4 mg, 4 mg, Intravenous, Q6H PRN, Seven Garduno MD, 4 mg at 11/29/24 0320    oxybutynin XL (DITROPAN-XL) 24 hr tablet 10 mg, 10 mg, Oral, Daily, Parker Castro MD, 10 mg at 11/28/24 0812    pantoprazole (PROTONIX) EC tablet 40 mg, 40 mg, Oral, BID AC, Parker Castro MD    Phosphorus Replacement - Follow Nurse / BPA Driven Protocol, , Not Applicable, PRN, Parker Castro MD    potassium chloride (KLOR-CON M20) CR tablet 40 mEq, 40 mEq, Oral, Q4H, Parker Castro MD    Potassium Replacement - Follow Nurse / BPA Driven Protocol, , Not Applicable, PRN, Parker Castro MD    sodium chloride 0.9 % flush 10 mL, 10 mL, Intravenous, PRN, Parker Castro MD    [COMPLETED] Insert Peripheral IV, , , Once **AND** sodium chloride 0.9 % flush 10 mL, 10 mL, Intravenous, PRN, Parker Castro MD    sodium chloride 0.9 % flush 10 mL, 10 mL, Intravenous, Q12H, Parker Castro MD, 10 mL at 11/28/24 2034    sodium chloride 0.9 % flush 10 mL, 10 mL, Intravenous, PRN, Parker Castro MD    sodium chloride 0.9 % infusion 40 mL, 40 mL, Intravenous, PRN, Parker Castro MD, 100 mL at 11/28/24 0939    [Transfer Hold] tiZANidine (ZANAFLEX) tablet 2 mg, 2 mg, Oral, Nightly PRN, Parker Castro MD    Review of Systems     Constitution:  negative for chills, fatigue and fevers  Eyes:  negative for blurriness and change of vision  ENT:   negative for sore throat and voice change  Respiratory: negative for  cough and shortness of  air  Cardiovascular:  Negative for chest pain or palpitations  Gastrointestinal:  negative for  See HPI  Endocrine: negative for   weight loss, unintended          Objective     Vital Signs  Temp:  [97.2 °F (36.2 °C)-98.3 °F (36.8 °C)] 98.3 °F (36.8 °C)  Heart Rate:  [76-96] 85  Resp:  [16-20] 18  BP: ()/(45-68) 117/46  Body mass index is 34.16 kg/m².    Intake/Output Summary (Last 24 hours) at 11/29/2024 0946  Last data filed at 11/29/2024 0900  Gross per 24 hour   Intake 720 ml   Output --   Net 720 ml     I/O this shift:  In: 360 [P.O.:360]  Out: -        Physical Exam:   General: patient awake, alert and cooperative, ill-appearing   Eyes: Normal lids and lashes, no scleral icterus   Neck: supple, normal ROM   Skin: warm and dry, not jaundiced   Cardiovascular: regular rhythm and rate, no murmurs auscultated   Pulm: clear to auscultation bilaterally, regular and unlabored   Abdomen: soft, nontender, nondistended; normal bowel sounds   Rectal: deferred   Extremities: no rash or edema   Psychiatric: Normal mood and behavior; memory intact         Results Review:    I have reviewed all of the patients current test results  Results from last 7 days   Lab Units 11/29/24 0222 11/28/24  1252 11/28/24  0511 11/27/24  1205   WBC 10*3/mm3 7.40  --  6.72 6.82   HEMOGLOBIN g/dL 10.5* 10.3* 10.6* 11.2*   HEMATOCRIT % 33.3* 32.0* 33.7* 34.6   PLATELETS 10*3/mm3 153  --  115* 144       Results from last 7 days   Lab Units 11/29/24 0222 11/28/24  0511 11/27/24  1205   SODIUM mmol/L 135* 137 137   POTASSIUM mmol/L 3.5 3.8 3.8   CHLORIDE mmol/L 99 102 101   CO2 mmol/L 28.0 21.0* 21.0*   BUN mg/dL 14 14 10   CREATININE mg/dL 0.66 0.65 0.67   CALCIUM mg/dL 8.7 8.9 8.7   BILIRUBIN mg/dL 0.6 0.8 0.7   ALK PHOS U/L 74 76 101   ALT (SGPT) U/L 58* 42* 43*   AST (SGOT) U/L 112* 64* 67*   GLUCOSE mg/dL 112* 148* 134*       Results from last 7 days   Lab Units 11/27/24  1205   INR  1.25*       Lab Results   Lab Value Date/Time     LIPASE 28 12/24/2023 2324       Radiology:    Imaging Results (Last 24 Hours)       ** No results found for the last 24 hours. **              Assessment & Plan       GI bleeding    Normocytic anemia    Portal vein thrombosis on Eliquis    Liver cirrhosis with esophageal and gastric varices      Impression/Plan    Liver cirrhosis  Esophageal varices status post banding  Nausea vomiting  Portal vein thrombosis on Eliquis      Appears Eliquis has been placed on hold.  Nausea vomiting did not begin until after consuming dinner last evening.  Esophageal varices were banded yesterday, unsure if too much intake contributing to current complaints.  Reglan added as as needed medication.  If emesis is not controlled with Reglan nursing asked to notify GI can consider Phenergan.  Would like to remain cautious with utilization of Phenergan in light of underlying liver disease.      Electronically signed by TRACEY Harry, 11/29/24, 9:46 AM CST.       TRACEY Harry  11/29/24  09:46 CST

## 2024-11-29 NOTE — CASE MANAGEMENT/SOCIAL WORK
Discharge Planning Assessment  [facility]     Patient Name: Reina Palomares  MRN: [id number]  Today's Date: 11/29/2024    Admit Date: 11/27/2024        Discharge Needs Assessment       Row Name 11/29/24 0901       Living Environment    People in Home alone    Current Living Arrangements home    Potentially Unsafe Housing Conditions none    In the past 12 months has the electric, gas, oil, or water company threatened to shut off services in your home? No    Primary Care Provided by self    Provides Primary Care For no one    Family Caregiver if Needed child(lois), adult    Family Caregiver Names CAMI WELLS (Son)  [phone] (Home Phone)    Quality of Family Relationships helpful;involved;supportive    Able to Return to Prior Arrangements yes       Resource/Environmental Concerns    Resource/Environmental Concerns none    Transportation Concerns none       Transportation Needs    In the past 12 months, has lack of transportation kept you from medical appointments or from getting medications? no    In the past 12 months, has lack of transportation kept you from meetings, work, or from getting things needed for daily living? No       Food Insecurity    Within the past 12 months, you worried that your food would run out before you got the money to buy more. Never true    Within the past 12 months, the food you bought just didn't last and you didn't have money to get more. Never true       Transition Planning    Patient/Family Anticipates Transition to home with family    Patient/Family Anticipated Services at Transition none    Transportation Anticipated family or friend will provide       Discharge Needs Assessment    Equipment Currently Used at Home none    Concerns to be Addressed denies needs/concerns at this time    Do you want help finding or keeping work or a job? I do not need or want help    Do you want help with school or training? For example, starting or completing job training or getting a high school  diploma, GED or equivalent No    Anticipated Changes Related to Illness none    Equipment Needed After Discharge none    Discharge Coordination/Progress Lives alone () but has a son to assist if needed.  No DME in use. Has established primary care (chronic illness) and has traditional Medicare coverage. Denies any immediate needs, but CM/SS will follow and be available to assist with any discharge needs.                   Discharge Plan    No documentation.                 Continued Care and Services - Admitted Since 11/27/2024    No active coordination exists for this encounter.          Demographic Summary    No documentation.                  Functional Status    No documentation.                  Psychosocial    No documentation.                  Abuse/Neglect    No documentation.                  Legal    No documentation.                  Substance Abuse    No documentation.                  Patient Forms    No documentation.                     Paola Arredondo RN

## 2024-11-29 NOTE — PROGRESS NOTES
[facility] Medicine Services  INPATIENT PROGRESS NOTE    Patient Name: Reina Palomares  Date of Admission: 11/27/2024  Today's Date: 11/29/24  Length of Stay: 0  Primary Care Physician: Dash Melendez MD    Subjective   Chief Complaint: none offered  HPI   EGD yesterday with identification of grade 2 varices. Varices were banded.  She had experienced nausea and vomiting after clear liquid diet.  She was given Reglan this morning and has since had relief of nausea.  She did have an episode of coughing up bright red blood noted on washcloth.  Repeat hemoglobin this afternoon 10.7.    Review of Systems   All pertinent negatives and positives are as above. All other systems have been reviewed and are negative unless otherwise stated.     Objective    Temp:  [97.3 °F (36.3 °C)-98.3 °F (36.8 °C)] 98.3 °F (36.8 °C)  Heart Rate:  [76-85] 85  Resp:  [16-18] 18  BP: ()/(45-68) 117/46  Physical Exam  Vitals reviewed.   Constitutional:       General: She is not in acute distress.     Appearance: She is obese. She is not toxic-appearing.   HENT:      Head: Normocephalic and atraumatic.      Mouth/Throat:      Mouth: Mucous membranes are moist.      Pharynx: Oropharynx is clear.   Eyes:      Extraocular Movements: Extraocular movements intact.      Conjunctiva/sclera: Conjunctivae normal.      Pupils: Pupils are equal, round, and reactive to light.   Cardiovascular:      Rate and Rhythm: Normal rate and regular rhythm.      Pulses: Normal pulses.   Pulmonary:      Effort: Pulmonary effort is normal. No respiratory distress.      Breath sounds: Normal breath sounds.   Abdominal:      General: Bowel sounds are normal. There is no distension.      Palpations: Abdomen is soft.      Tenderness: There is no abdominal tenderness.   Musculoskeletal:         General: No swelling or tenderness. Normal range of motion.      Cervical back: Normal range of motion and neck supple. No muscular  "tenderness.   Skin:     General: Skin is warm and dry.      Findings: Rash (erythematous rash on arms, legs, back) present.   Neurological:      General: No focal deficit present.      Mental Status: She is alert and oriented to person, place, and time.      Cranial Nerves: No cranial nerve deficit.      Motor: No weakness.   Psychiatric:         Mood and Affect: Mood normal.         Behavior: Behavior normal.       Results Review:  I have reviewed the labs, radiology results, and diagnostic studies.    Laboratory Data:   Results from last 7 days   Lab Units 11/29/24  0222 11/28/24  1252 11/28/24  0511 11/27/24  1205   WBC 10*3/mm3 7.40  --  6.72 6.82   HEMOGLOBIN g/dL 10.5* 10.3* 10.6* 11.2*   HEMATOCRIT % 33.3* 32.0* 33.7* 34.6   PLATELETS 10*3/mm3 153  --  115* 144        Results from last 7 days   Lab Units 11/29/24  0222 11/28/24  0511 11/27/24  1205   SODIUM mmol/L 135* 137 137   POTASSIUM mmol/L 3.5 3.8 3.8   CHLORIDE mmol/L 99 102 101   CO2 mmol/L 28.0 21.0* 21.0*   BUN mg/dL 14 14 10   CREATININE mg/dL 0.66 0.65 0.67   CALCIUM mg/dL 8.7 8.9 8.7   BILIRUBIN mg/dL 0.6 0.8 0.7   ALK PHOS U/L 74 76 101   ALT (SGPT) U/L 58* 42* 43*   AST (SGOT) U/L 112* 64* 67*   GLUCOSE mg/dL 112* 148* 134*       Culture Data:   No results found for: \"ACANTHNAEG\", \"AFBCX\", \"BPERTUSSISCX\", \"BLOODCX\"  No results found for: \"BCIDPCR\", \"CXREFLEX\", \"CSFCX\", \"CULTURETIS\"  No results found for: \"CULTURES\", \"HSVCX\", \"URCX\"  No results found for: \"EYECULTURE\", \"GCCX\", \"HSVCULTURE\", \"LABHSV\"  No results found for: \"LEGIONELLA\", \"MRSACX\", \"MUMPSCX\", \"MYCOPLASCX\"  No results found for: \"NOCARDIACX\", \"STOOLCX\"  No results found for: \"THROATCX\", \"UNSTIMCULT\", \"URINECX\", \"CULTURE\", \"VZVCULTUR\"  No results found for: \"VIRALCULTU\", \"WOUNDCX\"    Radiology Data:   Imaging Results (Last 24 Hours)       ** No results found for the last 24 hours. **            I have reviewed the patient's current medications.     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  She presented to the ER on 11/27 with complaints of bright red blood per rectum.  She had a bowel movement which most mostly blood and very little stool and then also found a spot of blood in her bed when she woke up.  She did not have any repeated episodes of bleeding in the ER.  She denies shortness of breath, chest pain, nausea or vomiting.  Additionally she also reports a skin rash.  She states that she thinks she had a bug bite in her left lower leg in May and then eventually developed blisters in her left ankle for which she was seen by her primary care doc via telehealth visit and was prescribed valacyclovir and Bactrim that she started 11/21.  For the past few days she has developed itching and erythema on her 4 extremities and some on her torso as well. Hospital medicine services asked to admit by ER physician after case discussed with case with GI physician who recommends observation. In the ER, patient hemodynamically stable and hemoglobin 11.    Treatment Plan  GI bleed. CT angio of the abdomen: No arterial contrast extravasation to suggest active brisk GI bleed.  Liver cirrhosis with sequela of portal hypertension, splenomegaly, lower esophageal and gastric varices.  Extensive diverticulosis of sigmoid colon.  Gastroenterology, Dr. 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 Eliquis on hold with last dose 11/26.      Pruritic rash  She was recently diagnosed with shingles on a telehealth visit and prescribed valacyclovir and Bactrim. Patient's feels as if she is allergic to one of those medicines. As needed Benadryl for pruritus. Diphenhydramine-zinc cream.     Hypothyroidism - continue Synthroid 112 mcg    PAD - follows with Dr. Mathews vascular surgery    History of lung cancer s/p left pneumonectomy -continue 2 L of oxygen at night    SCDs for DVT prophylaxis.    Medical Decision Making  Number and Complexity of problems: 3 acute problems  Differential Diagnosis: As above    Conditions and Status        Condition is unchanged.     [facility] Data  External documents reviewed: prior [facility] records  Cardiac tracing (EKG, telemetry) interpretation: None  Radiology interpretation: Interpreted by radiology  Labs reviewed: As above  Any tests that were considered but not ordered: none considered at present     Decision rules/scores evaluated (example [id number], Wells, etc): none considered at present     Discussed with: Patient and Dr. Castro     Care Planning  Shared decision making: Patient is agreeable to ongoing workup and treatment  Code status and discussions: No CPR with full interventions    Disposition  Social Determinants of Health that impact treatment or disposition: None  I expect the patient to be discharged to home in 1-2 days.     Electronically signed by TRACEY Jovel, 11/29/24, 10:44 CST.

## 2024-11-29 NOTE — PLAN OF CARE
Goal Outcome Evaluation:  Plan of Care Reviewed With: patient        Progress: improving  Outcome Evaluation: Pt alert and oriented. No signs of active bleeding today. Did have EGD today with 3 bands placed. Remains on clear liquids and tolerating well. itching has been better today and rash seems to be better as well. Rocephin given as ordered and safety maintained     Pt called out and rash too legs has worsened, believed to be from heat of scd's.   Removed, pt showering and prn benadryl given

## 2024-11-29 NOTE — NURSING NOTE
Pt growing more concerned about rash covering her body. Pictures taken and added to media for comparison on following days.

## 2024-11-29 NOTE — PLAN OF CARE
Goal Outcome Evaluation:  Plan of Care Reviewed With: patient        Progress: no change  Outcome Evaluation: Pt resting intermittently. Multiple episodes of emesis starting around 0200, zofran given with minimal relief. PRN atarax given but pt threw it up so unable to determine effectiveness. Rash spreading more over body, pictures in chart. Clear liquids. Up ad kevin, voiding. No BM this shift so far. Safety maintained.

## 2024-11-30 LAB
ALBUMIN SERPL-MCNC: 4.1 G/DL (ref 3.5–5.2)
ALBUMIN/GLOB SERPL: 1.2 G/DL
ALP SERPL-CCNC: 68 U/L (ref 39–117)
ALT SERPL W P-5'-P-CCNC: 47 U/L (ref 1–33)
ANION GAP SERPL CALCULATED.3IONS-SCNC: 11 MMOL/L (ref 5–15)
AST SERPL-CCNC: 62 U/L (ref 1–32)
BILIRUB SERPL-MCNC: 0.5 MG/DL (ref 0–1.2)
BUN SERPL-MCNC: 19 MG/DL (ref 8–23)
BUN/CREAT SERPL: 31.7 (ref 7–25)
CALCIUM SPEC-SCNC: 8.5 MG/DL (ref 8.6–10.5)
CHLORIDE SERPL-SCNC: 102 MMOL/L (ref 98–107)
CO2 SERPL-SCNC: 26 MMOL/L (ref 22–29)
CREAT SERPL-MCNC: 0.6 MG/DL (ref 0.57–1)
DEPRECATED RDW RBC AUTO: 51.6 FL (ref 37–54)
EGFRCR SERPLBLD CKD-EPI 2021: 94.9 ML/MIN/1.73
ERYTHROCYTE [DISTWIDTH] IN BLOOD BY AUTOMATED COUNT: 15.9 % (ref 12.3–15.4)
GLOBULIN UR ELPH-MCNC: 3.4 GM/DL
GLUCOSE SERPL-MCNC: 127 MG/DL (ref 65–99)
HCT VFR BLD AUTO: 34.2 % (ref 34–46.6)
HGB BLD-MCNC: 10.8 G/DL (ref 12–15.9)
MCH RBC QN AUTO: 28.7 PG (ref 26.6–33)
MCHC RBC AUTO-ENTMCNC: 31.6 G/DL (ref 31.5–35.7)
MCV RBC AUTO: 91 FL (ref 79–97)
PLATELET # BLD AUTO: 110 10*3/MM3 (ref 140–450)
PMV BLD AUTO: 9.6 FL (ref 6–12)
POTASSIUM SERPL-SCNC: 3.5 MMOL/L (ref 3.5–5.2)
POTASSIUM SERPL-SCNC: 4.1 MMOL/L (ref 3.5–5.2)
PROT SERPL-MCNC: 7.5 G/DL (ref 6–8.5)
RBC # BLD AUTO: 3.76 10*6/MM3 (ref 3.77–5.28)
SODIUM SERPL-SCNC: 139 MMOL/L (ref 136–145)
WBC NRBC COR # BLD AUTO: 8.11 10*3/MM3 (ref 3.4–10.8)

## 2024-11-30 PROCEDURE — 25010000002 CEFTRIAXONE PER 250 MG: Performed by: INTERNAL MEDICINE

## 2024-11-30 PROCEDURE — 85027 COMPLETE CBC AUTOMATED: CPT | Performed by: NURSE PRACTITIONER

## 2024-11-30 PROCEDURE — 80053 COMPREHEN METABOLIC PANEL: CPT | Performed by: NURSE PRACTITIONER

## 2024-11-30 PROCEDURE — 25010000002 METOCLOPRAMIDE PER 10 MG: Performed by: NURSE PRACTITIONER

## 2024-11-30 PROCEDURE — 84132 ASSAY OF SERUM POTASSIUM: CPT | Performed by: STUDENT IN AN ORGANIZED HEALTH CARE EDUCATION/TRAINING PROGRAM

## 2024-11-30 PROCEDURE — 99232 SBSQ HOSP IP/OBS MODERATE 35: CPT | Performed by: NURSE PRACTITIONER

## 2024-11-30 RX ORDER — METOCLOPRAMIDE HYDROCHLORIDE 5 MG/ML
10 INJECTION INTRAMUSCULAR; INTRAVENOUS EVERY 6 HOURS PRN
Status: DISCONTINUED | OUTPATIENT
Start: 2024-11-30 | End: 2024-12-02 | Stop reason: HOSPADM

## 2024-11-30 RX ORDER — POTASSIUM CHLORIDE 1500 MG/1
40 TABLET, EXTENDED RELEASE ORAL EVERY 4 HOURS
Status: COMPLETED | OUTPATIENT
Start: 2024-11-30 | End: 2024-11-30

## 2024-11-30 RX ADMIN — CARVEDILOL 3.12 MG: 3.12 TABLET, FILM COATED ORAL at 17:53

## 2024-11-30 RX ADMIN — METOCLOPRAMIDE 10 MG: 5 INJECTION, SOLUTION INTRAMUSCULAR; INTRAVENOUS at 05:54

## 2024-11-30 RX ADMIN — PANTOPRAZOLE SODIUM 40 MG: 40 TABLET, DELAYED RELEASE ORAL at 17:53

## 2024-11-30 RX ADMIN — LEVOTHYROXINE SODIUM 112 MCG: 112 TABLET ORAL at 05:54

## 2024-11-30 RX ADMIN — POTASSIUM CHLORIDE 40 MEQ: 1500 TABLET, EXTENDED RELEASE ORAL at 11:32

## 2024-11-30 RX ADMIN — PANTOPRAZOLE SODIUM 40 MG: 40 TABLET, DELAYED RELEASE ORAL at 09:18

## 2024-11-30 RX ADMIN — Medication 10 ML: at 09:56

## 2024-11-30 RX ADMIN — OXYBUTYNIN CHLORIDE 10 MG: 5 TABLET, EXTENDED RELEASE ORAL at 09:18

## 2024-11-30 RX ADMIN — METOCLOPRAMIDE 10 MG: 5 INJECTION, SOLUTION INTRAMUSCULAR; INTRAVENOUS at 20:22

## 2024-11-30 RX ADMIN — POTASSIUM CHLORIDE 40 MEQ: 1500 TABLET, EXTENDED RELEASE ORAL at 09:19

## 2024-11-30 RX ADMIN — CEFTRIAXONE SODIUM 2000 MG: 2 INJECTION, POWDER, FOR SOLUTION INTRAMUSCULAR; INTRAVENOUS at 09:55

## 2024-11-30 RX ADMIN — Medication 10 ML: at 20:21

## 2024-11-30 RX ADMIN — CARVEDILOL 3.12 MG: 3.12 TABLET, FILM COATED ORAL at 09:19

## 2024-11-30 NOTE — PROGRESS NOTES
[facility] Medicine Services  INPATIENT PROGRESS NOTE    Patient Name: Reina Palomares  Date of Admission: 11/27/2024  Today's Date: 11/30/24  Length of Stay: 1  Primary Care Physician: Dash Melendez MD    Subjective   Chief Complaint: nausea  HPI   No further vomiting since yesterday morning.  She does feel nauseous but has been able to keep down some liquids.  Hemoglobin 10.8 today.  She had 1 episode of loose green stool yesterday.    Review of Systems   All pertinent negatives and positives are as above. All other systems have been reviewed and are negative unless otherwise stated.     Objective    Temp:  [98.7 °F (37.1 °C)-99.1 °F (37.3 °C)] 98.8 °F (37.1 °C)  Heart Rate:  [64-85] 77  Resp:  [16] 16  BP: ()/(47-72) 119/49  Physical Exam  Vitals reviewed.   Constitutional:       General: She is not in acute distress.     Appearance: She is obese. She is not toxic-appearing.   HENT:      Head: Normocephalic and atraumatic.      Mouth/Throat:      Mouth: Mucous membranes are moist.      Pharynx: Oropharynx is clear.   Eyes:      Extraocular Movements: Extraocular movements intact.      Conjunctiva/sclera: Conjunctivae normal.      Pupils: Pupils are equal, round, and reactive to light.   Cardiovascular:      Rate and Rhythm: Normal rate and regular rhythm.      Pulses: Normal pulses.   Pulmonary:      Effort: Pulmonary effort is normal. No respiratory distress.      Breath sounds: Normal breath sounds.   Abdominal:      General: Bowel sounds are normal. There is no distension.      Palpations: Abdomen is soft.      Tenderness: There is no abdominal tenderness.   Musculoskeletal:         General: No swelling or tenderness. Normal range of motion.      Cervical back: Normal range of motion and neck supple. No muscular tenderness.   Skin:     General: Skin is warm and dry.      Findings: Rash (erythematous rash on arms, legs, back) present.   Neurological:      General: No  "focal deficit present.      Mental Status: She is alert and oriented to person, place, and time.      Cranial Nerves: No cranial nerve deficit.      Motor: No weakness.   Psychiatric:         Mood and Affect: Mood normal.         Behavior: Behavior normal.       Results Review:  I have reviewed the labs, radiology results, and diagnostic studies.    Laboratory Data:   Results from last 7 days   Lab Units 11/30/24  0618 11/29/24  1224 11/29/24  0222 11/28/24  1252 11/28/24  0511   WBC 10*3/mm3 8.11  --  7.40  --  6.72   HEMOGLOBIN g/dL 10.8* 10.7* 10.5*   < > 10.6*   HEMATOCRIT % 34.2 33.4* 33.3*   < > 33.7*   PLATELETS 10*3/mm3 110*  --  153  --  115*    < > = values in this interval not displayed.        Results from last 7 days   Lab Units 11/30/24  0618 11/29/24  1224 11/29/24  0222 11/28/24  0511   SODIUM mmol/L 139  --  135* 137   POTASSIUM mmol/L 3.5 4.1 3.5 3.8   CHLORIDE mmol/L 102  --  99 102   CO2 mmol/L 26.0  --  28.0 21.0*   BUN mg/dL 19  --  14 14   CREATININE mg/dL 0.60  --  0.66 0.65   CALCIUM mg/dL 8.5*  --  8.7 8.9   BILIRUBIN mg/dL 0.5  --  0.6 0.8   ALK PHOS U/L 68  --  74 76   ALT (SGPT) U/L 47*  --  58* 42*   AST (SGOT) U/L 62*  --  112* 64*   GLUCOSE mg/dL 127*  --  112* 148*       Culture Data:   No results found for: \"ACANTHNAEG\", \"AFBCX\", \"BPERTUSSISCX\", \"BLOODCX\"  No results found for: \"BCIDPCR\", \"CXREFLEX\", \"CSFCX\", \"CULTURETIS\"  No results found for: \"CULTURES\", \"HSVCX\", \"URCX\"  No results found for: \"EYECULTURE\", \"GCCX\", \"HSVCULTURE\", \"LABHSV\"  No results found for: \"LEGIONELLA\", \"MRSACX\", \"MUMPSCX\", \"MYCOPLASCX\"  No results found for: \"NOCARDIACX\", \"STOOLCX\"  No results found for: \"THROATCX\", \"UNSTIMCULT\", \"URINECX\", \"CULTURE\", \"VZVCULTUR\"  No results found for: \"VIRALCULTU\", \"WOUNDCX\"    Radiology Data:   Imaging Results (Last 24 Hours)       ** No results found for the last 24 hours. **            I have reviewed the patient's current medications.     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  She presented to the ER on 11/27 with complaints of bright red blood per rectum.  She had a bowel movement which most mostly blood and very little stool and then also found a spot of blood in her bed when she woke up.  She did not have any repeated episodes of bleeding in the ER.  She denies shortness of breath, chest pain, nausea or vomiting.  Additionally she also reports a skin rash.  She states that she thinks she had a bug bite in her left lower leg in May and then eventually developed blisters in her left ankle for which she was seen by her primary care doc via telehealth visit and was prescribed valacyclovir and Bactrim that she started 11/21.  For the past few days she has developed itching and erythema on her 4 extremities and some on her torso as well. Hospital medicine services asked to admit by ER physician after case discussed with case with GI physician who recommends observation. In the ER, patient hemodynamically stable and hemoglobin 11.    Treatment Plan  GI bleed. CT angio of the abdomen: No arterial contrast extravasation to suggest active brisk GI bleed.  Liver cirrhosis with sequela of portal hypertension, splenomegaly, lower esophageal and gastric varices.  Extensive diverticulosis of sigmoid colon.  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 Eliquis on hold with last dose 11/26.      Pruritic rash  She was recently diagnosed with shingles on a telehealth visit and prescribed valacyclovir and Bactrim. Patient's feels as if she is allergic to one of those medicines. As needed Benadryl for pruritus. Diphenhydramine-zinc cream.     Hypothyroidism - continue Synthroid 112 mcg    PAD - follows with Dr. Mathews vascular surgery    History of lung cancer s/p left pneumonectomy -continue 2 L of oxygen at night    SCDs for DVT prophylaxis.    Medical Decision Making  Number and Complexity of problems: 3 acute problems  Differential Diagnosis: As above    Conditions and Status        Condition is unchanged.     [facility] Data  External documents reviewed: prior [facility] records  Cardiac tracing (EKG, telemetry) interpretation: None  Radiology interpretation: Interpreted by radiology  Labs reviewed: As above  Any tests that were considered but not ordered: none considered at present     Decision rules/scores evaluated (example [id number], Wells, etc): none considered at present     Discussed with: Patient and Dr. Castro     Care Planning  Shared decision making: Patient is agreeable to ongoing workup and treatment  Code status and discussions: No CPR with full interventions    Disposition  Social Determinants of Health that impact treatment or disposition: None  I expect the patient to be discharged to home in 1-2 days.     Electronically signed by TRACEY Jovel, 11/30/24, 10:35 CST.

## 2024-11-30 NOTE — PLAN OF CARE
Goal Outcome Evaluation:  Plan of Care Reviewed With: patient        Progress: improving  Outcome Evaluation: Generalized rash is improved. Per GI, reglan changed to prn instead of scheduled. No signs of active bleeding today and over all tolerating liquids well. safety maintained

## 2024-11-30 NOTE — PROGRESS NOTES
[facility]  Inpatient Progress Note      Date of Admission: 11/27/2024  Date of Service:  11/30/24    Reason for Follow Up: Rectal bleeding    Subjective     Subjective:     Patient reports improvement in symptoms compared to yesterday.  No further nausea vomiting since yesterday.  EGD with banding of grade 2 varices on 11/28/2024.  1 green bowel movement yesterday.    Current Facility-Administered Medications:     acetaminophen (TYLENOL) tablet 650 mg, 650 mg, Oral, Q4H PRN, 650 mg at 11/29/24 0224 **OR** acetaminophen (TYLENOL) 160 MG/5ML oral solution 650 mg, 650 mg, Oral, Q4H PRN **OR** acetaminophen (TYLENOL) suppository 650 mg, 650 mg, Rectal, Q4H PRN, Parker Castro MD    [Held by provider] apixaban (ELIQUIS) tablet 2.5 mg, 2.5 mg, Oral, Q12H, Parker Castro MD    sennosides-docusate (PERICOLACE) 8.6-50 MG per tablet 2 tablet, 2 tablet, Oral, BID PRN **AND** polyethylene glycol (MIRALAX) packet 17 g, 17 g, Oral, Daily PRN **AND** bisacodyl (DULCOLAX) EC tablet 5 mg, 5 mg, Oral, Daily PRN **AND** bisacodyl (DULCOLAX) suppository 10 mg, 10 mg, Rectal, Daily PRN, Parker Castro MD    Calcium Replacement - Follow Nurse / BPA Driven Protocol, , Not Applicable, PRN, Parker Castro MD    carvedilol (COREG) tablet 3.125 mg, 3.125 mg, Oral, BID With Meals, Parker Castro MD, 3.125 mg at 11/30/24 0919    cefTRIAXone (ROCEPHIN) 2,000 mg in sodium chloride 0.9 % 100 mL IVPB, 2,000 mg, Intravenous, Q24H, Chandrika Oconnell MD, Last Rate: 200 mL/hr at 11/29/24 1242, 2,000 mg at 11/29/24 1242    diphenhydrAMINE-zinc acetate 2-0.1 % cream 1 Application, 1 Application, Topical, TID PRN, Viji Ramirez APRN, 1 Application at 11/29/24 2233    hydrOXYzine (ATARAX) tablet 25 mg, 25 mg, Oral, Q4H PRN, Seven Garduno MD, 25 mg at 11/29/24 2233    levothyroxine (SYNTHROID, LEVOTHROID) tablet 112 mcg, 112 mcg, Oral, Q AM, Parker Castro MD, 112 mcg at 11/30/24 0558     Magnesium Standard Dose Replacement - Follow Nurse / BPA Driven Protocol, , Not Applicable, PRN, Parker Castro MD    metoclopramide (REGLAN) injection 10 mg, 10 mg, Intravenous, Q6H, Federico Cox, TRACEY, 10 mg at 11/30/24 0918    nitroglycerin (NITROSTAT) SL tablet 0.4 mg, 0.4 mg, Sublingual, Q5 Min PRN, Parker Castro MD    ondansetron (ZOFRAN) injection 4 mg, 4 mg, Intravenous, Q6H PRN, Seven Garduno MD, 4 mg at 11/29/24 0320    oxybutynin XL (DITROPAN-XL) 24 hr tablet 10 mg, 10 mg, Oral, Daily, Parker Castro MD, 10 mg at 11/30/24 0918    pantoprazole (PROTONIX) EC tablet 40 mg, 40 mg, Oral, BID AC, Parker Castro MD, 40 mg at 11/30/24 0918    Phosphorus Replacement - Follow Nurse / BPA Driven Protocol, , Not Applicable, PRN, Parker Castro MD    potassium chloride (KLOR-CON M20) CR tablet 40 mEq, 40 mEq, Oral, Q4H, Parker Castro MD, 40 mEq at 11/30/24 0919    Potassium Replacement - Follow Nurse / BPA Driven Protocol, , Not Applicable, PRN, Parker Castro MD    sodium chloride 0.9 % flush 10 mL, 10 mL, Intravenous, PRN, Parker Castro MD    [COMPLETED] Insert Peripheral IV, , , Once **AND** sodium chloride 0.9 % flush 10 mL, 10 mL, Intravenous, PRN, Parker Castro MD    sodium chloride 0.9 % flush 10 mL, 10 mL, Intravenous, Q12H, Parker Castro MD, 10 mL at 11/29/24 2233    sodium chloride 0.9 % flush 10 mL, 10 mL, Intravenous, PRN, Parker Castro MD    sodium chloride 0.9 % infusion 40 mL, 40 mL, Intravenous, PRN, Parker Castro MD, 100 mL at 11/28/24 0939    [Transfer Hold] tiZANidine (ZANAFLEX) tablet 2 mg, 2 mg, Oral, Nightly PRN, Parker Castro MD    Review of Systems     Constitution:  negative for chills, fatigue and fevers  Eyes:  negative for blurriness and change of vision  ENT:   negative for sore throat and voice change  Respiratory: negative for  cough and shortness of air  Cardiovascular:  Negative for chest pain  or palpitations  Gastrointestinal:  negative for  See HPI  Endocrine: negative for   weight loss, unintended          Objective     Vital Signs  Temp:  [98.7 °F (37.1 °C)-99.1 °F (37.3 °C)] 98.8 °F (37.1 °C)  Heart Rate:  [64-85] 77  Resp:  [16] 16  BP: ()/(47-72) 119/49  Body mass index is 34.16 kg/m².    Intake/Output Summary (Last 24 hours) at 11/30/2024 0939  Last data filed at 11/29/2024 1300  Gross per 24 hour   Intake 240 ml   Output --   Net 240 ml     No intake/output data recorded.       Physical Exam:   General: patient awake, alert and cooperative   Eyes: Normal lids and lashes, no scleral icterus   Neck: supple, normal ROM   Skin: warm and dry, not jaundiced   Cardiovascular: regular rhythm and rate, no murmurs auscultated   Pulm: clear to auscultation bilaterally, regular and unlabored   Abdomen: soft, nontender, nondistended; normal bowel sounds   Rectal: deferred   Extremities: no rash or edema   Psychiatric: Normal mood and behavior; memory intact         Results Review:    I have reviewed all of the patients current test results  Results from last 7 days   Lab Units 11/30/24  0618 11/29/24  1224 11/29/24  0222 11/28/24  1252 11/28/24  0511   WBC 10*3/mm3 8.11  --  7.40  --  6.72   HEMOGLOBIN g/dL 10.8* 10.7* 10.5*   < > 10.6*   HEMATOCRIT % 34.2 33.4* 33.3*   < > 33.7*   PLATELETS 10*3/mm3 110*  --  153  --  115*    < > = values in this interval not displayed.       Results from last 7 days   Lab Units 11/30/24  0618 11/29/24  1224 11/29/24  0222 11/28/24  0511   SODIUM mmol/L 139  --  135* 137   POTASSIUM mmol/L 3.5 4.1 3.5 3.8   CHLORIDE mmol/L 102  --  99 102   CO2 mmol/L 26.0  --  28.0 21.0*   BUN mg/dL 19  --  14 14   CREATININE mg/dL 0.60  --  0.66 0.65   CALCIUM mg/dL 8.5*  --  8.7 8.9   BILIRUBIN mg/dL 0.5  --  0.6 0.8   ALK PHOS U/L 68  --  74 76   ALT (SGPT) U/L 47*  --  58* 42*   AST (SGOT) U/L 62*  --  112* 64*   GLUCOSE mg/dL 127*  --  112* 148*       Results from last 7 days    Lab Units 11/27/24  1205   INR  1.25*       Lab Results   Lab Value Date/Time    LIPASE 28 12/24/2023 4536       Radiology:    Imaging Results (Last 24 Hours)       ** No results found for the last 24 hours. 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  Reglan will be changed from scheduled to as needed.  I did ask nursing to hold this morning's scheduled dose.  I do recommend she remain on liquids versus soft diet for now.  Okay to advance as she tolerates.        Electronically signed by TRACEY Harry, 11/30/24, 9:39 AM CST.       TRACEY Harry  11/30/24  09:39 CST

## 2024-11-30 NOTE — PLAN OF CARE
Goal Outcome Evaluation:  Plan of Care Reviewed With: patient        Progress: no change  Outcome Evaluation: VSS. Generalized rash improved. Safety maintained. Pt reported 1 BM loose, green in color. No c/o N/V.

## 2024-12-01 LAB — AMMONIA BLD-SCNC: 21 UMOL/L (ref 11–51)

## 2024-12-01 PROCEDURE — 99232 SBSQ HOSP IP/OBS MODERATE 35: CPT | Performed by: NURSE PRACTITIONER

## 2024-12-01 PROCEDURE — 25010000002 METOCLOPRAMIDE PER 10 MG: Performed by: NURSE PRACTITIONER

## 2024-12-01 PROCEDURE — 82140 ASSAY OF AMMONIA: CPT | Performed by: STUDENT IN AN ORGANIZED HEALTH CARE EDUCATION/TRAINING PROGRAM

## 2024-12-01 RX ORDER — POLYETHYLENE GLYCOL 3350 17 G/17G
17 POWDER, FOR SOLUTION ORAL DAILY
Status: DISCONTINUED | OUTPATIENT
Start: 2024-12-01 | End: 2024-12-02 | Stop reason: HOSPADM

## 2024-12-01 RX ADMIN — ACETAMINOPHEN 650 MG: 325 TABLET ORAL at 16:21

## 2024-12-01 RX ADMIN — METOCLOPRAMIDE 10 MG: 5 INJECTION, SOLUTION INTRAMUSCULAR; INTRAVENOUS at 13:13

## 2024-12-01 RX ADMIN — OXYBUTYNIN CHLORIDE 10 MG: 5 TABLET, EXTENDED RELEASE ORAL at 07:54

## 2024-12-01 RX ADMIN — Medication 10 ML: at 23:04

## 2024-12-01 RX ADMIN — POLYETHYLENE GLYCOL 3350 17 G: 17 POWDER, FOR SOLUTION ORAL at 10:47

## 2024-12-01 RX ADMIN — DIPHENHYDRAMINE HYDROCHLORIDE, ZINC ACETATE 1 APPLICATION: 2; .1 CREAM TOPICAL at 07:54

## 2024-12-01 RX ADMIN — CARVEDILOL 3.12 MG: 3.12 TABLET, FILM COATED ORAL at 17:22

## 2024-12-01 RX ADMIN — LEVOTHYROXINE SODIUM 112 MCG: 112 TABLET ORAL at 06:01

## 2024-12-01 RX ADMIN — CARVEDILOL 3.12 MG: 3.12 TABLET, FILM COATED ORAL at 07:54

## 2024-12-01 RX ADMIN — PANTOPRAZOLE SODIUM 40 MG: 40 TABLET, DELAYED RELEASE ORAL at 17:22

## 2024-12-01 RX ADMIN — METOCLOPRAMIDE 10 MG: 5 INJECTION, SOLUTION INTRAMUSCULAR; INTRAVENOUS at 23:04

## 2024-12-01 RX ADMIN — Medication 10 ML: at 10:48

## 2024-12-01 RX ADMIN — ACETAMINOPHEN 650 MG: 325 TABLET ORAL at 23:11

## 2024-12-01 RX ADMIN — PANTOPRAZOLE SODIUM 40 MG: 40 TABLET, DELAYED RELEASE ORAL at 06:01

## 2024-12-01 NOTE — PROGRESS NOTES
"    [facility]  INPATIENT PROGRESS NOTE    Patient Name: Reina Palomares  Date of Admission: 11/27/2024  Today's Date: 12/01/24  Length of Stay: 2  Primary Care Physician: Dash Melendez MD    Subjective   Chief Complaint: nausea  HPI   She has had ongoing nausea today.  No vomiting.  She tried to drink some apple juice this morning and \"it came right back up.\" No signs of bleeding.  Last bowel movement was 11/29.    Review of Systems   All pertinent negatives and positives are as above. All other systems have been reviewed and are negative unless otherwise stated.     Objective    Temp:  [98.6 °F (37 °C)-99.6 °F (37.6 °C)] 98.6 °F (37 °C)  Heart Rate:  [72-79] 79  Resp:  [16] 16  BP: (112-139)/(50-59) 139/53  Physical Exam  Vitals reviewed.   Constitutional:       General: She is not in acute distress.     Appearance: She is obese. She is not toxic-appearing.   HENT:      Head: Normocephalic and atraumatic.      Mouth/Throat:      Mouth: Mucous membranes are moist.      Pharynx: Oropharynx is clear.   Eyes:      Extraocular Movements: Extraocular movements intact.      Conjunctiva/sclera: Conjunctivae normal.      Pupils: Pupils are equal, round, and reactive to light.   Cardiovascular:      Rate and Rhythm: Normal rate and regular rhythm.      Pulses: Normal pulses.   Pulmonary:      Effort: Pulmonary effort is normal. No respiratory distress.      Breath sounds: Normal breath sounds.   Abdominal:      General: Bowel sounds are normal. There is no distension.      Palpations: Abdomen is soft.      Tenderness: There is no abdominal tenderness.   Musculoskeletal:         General: No swelling or tenderness. Normal range of motion.      Cervical back: Normal range of motion and neck supple. No muscular tenderness.   Skin:     General: Skin is warm and dry.      Findings: Rash (erythematous rash on arms, legs, back) present.   Neurological:      General: No focal deficit " "present.      Mental Status: She is alert and oriented to person, place, and time.      Cranial Nerves: No cranial nerve deficit.      Motor: No weakness.   Psychiatric:         Mood and Affect: Mood normal.         Behavior: Behavior normal.       Results Review:  I have reviewed the labs, radiology results, and diagnostic studies.    Laboratory Data:   Results from last 7 days   Lab Units 11/30/24  0618 11/29/24  1224 11/29/24  0222 11/28/24  1252 11/28/24  0511   WBC 10*3/mm3 8.11  --  7.40  --  6.72   HEMOGLOBIN g/dL 10.8* 10.7* 10.5*   < > 10.6*   HEMATOCRIT % 34.2 33.4* 33.3*   < > 33.7*   PLATELETS 10*3/mm3 110*  --  153  --  115*    < > = values in this interval not displayed.        Results from last 7 days   Lab Units 11/30/24  1604 11/30/24  0618 11/29/24  1224 11/29/24  0222 11/28/24  0511   SODIUM mmol/L  --  139  --  135* 137   POTASSIUM mmol/L 4.1 3.5 4.1 3.5 3.8   CHLORIDE mmol/L  --  102  --  99 102   CO2 mmol/L  --  26.0  --  28.0 21.0*   BUN mg/dL  --  19  --  14 14   CREATININE mg/dL  --  0.60  --  0.66 0.65   CALCIUM mg/dL  --  8.5*  --  8.7 8.9   BILIRUBIN mg/dL  --  0.5  --  0.6 0.8   ALK PHOS U/L  --  68  --  74 76   ALT (SGPT) U/L  --  47*  --  58* 42*   AST (SGOT) U/L  --  62*  --  112* 64*   GLUCOSE mg/dL  --  127*  --  112* 148*       Culture Data:   No results found for: \"ACANTHNAEG\", \"AFBCX\", \"BPERTUSSISCX\", \"BLOODCX\"  No results found for: \"BCIDPCR\", \"CXREFLEX\", \"CSFCX\", \"CULTURETIS\"  No results found for: \"CULTURES\", \"HSVCX\", \"URCX\"  No results found for: \"EYECULTURE\", \"GCCX\", \"HSVCULTURE\", \"LABHSV\"  No results found for: \"LEGIONELLA\", \"MRSACX\", \"MUMPSCX\", \"MYCOPLASCX\"  No results found for: \"NOCARDIACX\", \"STOOLCX\"  No results found for: \"THROATCX\", \"UNSTIMCULT\", \"URINECX\", \"CULTURE\", \"VZVCULTUR\"  No results found for: \"VIRALCULTU\", \"WOUNDCX\"    Radiology Data:   Imaging Results (Last 24 Hours)       ** No results found for the last 24 hours. **            I have reviewed the " patient's current medications.     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  She presented to the ER on 11/27 with complaints of bright red blood per rectum.  She had a bowel movement which most mostly blood and very little stool and then also found a spot of blood in her bed when she woke up.  She did not have any repeated episodes of bleeding in the ER.  She denies shortness of breath, chest pain, nausea or vomiting.  Additionally she also reports a skin rash.  She states that she thinks she had a bug bite in her left lower leg in May and then eventually developed blisters in her left ankle for which she was seen by her primary care doc via telehealth visit and was prescribed valacyclovir and Bactrim that she started 11/21.  For the past few days she has developed itching and erythema on her 4 extremities and some on her torso as well. Hospital medicine services asked to admit by ER physician after case discussed with case with GI physician who recommends observation. In the ER, patient hemodynamically stable and hemoglobin 11.    Treatment Plan  GI bleed. CT angio of the abdomen: No arterial contrast extravasation to suggest active brisk GI bleed.  Liver cirrhosis with sequela of portal hypertension, splenomegaly, lower esophageal and gastric varices.  Extensive diverticulosis of sigmoid colon.  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 Eliquis on hold with last dose 11/26.     Pruritic rash  She was recently diagnosed with shingles on a telehealth visit and prescribed valacyclovir and Bactrim. Patient's feels as if she is allergic to one of those medicines. As needed Benadryl for pruritus. Diphenhydramine-zinc cream.     Hypothyroidism - continue Synthroid 112 mcg    PAD - follows with Dr. Mathews vascular surgery    History of lung cancer s/p left pneumonectomy -continue 2 L of oxygen at night    Up with meals.  Ambulate 3 times daily.  SCDs for DVT prophylaxis.    Medical Decision Making  Number and Complexity of problems: 3 acute problems  Differential Diagnosis: As above    Conditions and Status        Condition is unchanged.     [facility] Data  External documents reviewed: prior [facility] records  Cardiac tracing (EKG, telemetry) interpretation: None  Radiology interpretation: Interpreted by radiology  Labs reviewed: As above  Any tests that were considered but not ordered: none considered at present     Decision rules/scores evaluated (example [id number], [place], etc): none considered at present     Discussed with: Patient and Dr. Castro     Care Planning  Shared decision making: Patient is agreeable to ongoing workup and treatment  Code status and discussions: No CPR with full interventions    Disposition  Social Determinants of Health that impact treatment or disposition: None  I expect the patient to be discharged to home in 1-2 days.     Electronically signed by TRACEY Jovel, 12/01/24, 09:10 CST.

## 2024-12-01 NOTE — PLAN OF CARE
Goal Outcome Evaluation:  Plan of Care Reviewed With: patient        Progress: improving  Outcome Evaluation: pt up in chair and ambulated around room several times today. PT consulted.  Orders to be up with all meals, which she also did. States nothing tastes good and appetite is poor. Coughing up phlem. No bm's today. Rash also better over all. safety maintained

## 2024-12-01 NOTE — PROGRESS NOTES
[facility]  Inpatient Progress Note      Date of Admission: 11/27/2024  Date of Service:  12/01/24    Reason for Follow Up: Rectal bleeding/nausea    Subjective     Subjective:     Patient lying in bed, no family present at bedside.  This morning patient complains of recurrent nausea, no emesis.  Not able to tolerate liquid diet.  No abdominal pain.  No signs of GI blood loss.  Very small bowel movement.    Current Facility-Administered Medications:     acetaminophen (TYLENOL) tablet 650 mg, 650 mg, Oral, Q4H PRN, 650 mg at 11/29/24 0224 **OR** acetaminophen (TYLENOL) 160 MG/5ML oral solution 650 mg, 650 mg, Oral, Q4H PRN **OR** acetaminophen (TYLENOL) suppository 650 mg, 650 mg, Rectal, Q4H PRN, Parker Castro MD    [Held by provider] apixaban (ELIQUIS) tablet 2.5 mg, 2.5 mg, Oral, Q12H, Parker Castro MD    sennosides-docusate (PERICOLACE) 8.6-50 MG per tablet 2 tablet, 2 tablet, Oral, BID PRN **AND** polyethylene glycol (MIRALAX) packet 17 g, 17 g, Oral, Daily PRN **AND** bisacodyl (DULCOLAX) EC tablet 5 mg, 5 mg, Oral, Daily PRN **AND** bisacodyl (DULCOLAX) suppository 10 mg, 10 mg, Rectal, Daily PRN, Parker Castor MD    Calcium Replacement - Follow Nurse / BPA Driven Protocol, , Not Applicable, PRN, Parker Castro MD    carvedilol (COREG) tablet 3.125 mg, 3.125 mg, Oral, BID With Meals, Parker Castro MD, 3.125 mg at 12/01/24 0754    diphenhydrAMINE-zinc acetate 2-0.1 % cream 1 Application, 1 Application, Topical, TID PRN, RamirezViji, APRN, 1 Application at 12/01/24 0754    hydrOXYzine (ATARAX) tablet 25 mg, 25 mg, Oral, Q4H PRN, Seven Garduno MD, 25 mg at 11/29/24 2233    levothyroxine (SYNTHROID, LEVOTHROID) tablet 112 mcg, 112 mcg, Oral, Q AM, Parker Castro MD, 112 mcg at 12/01/24 0601    Magnesium Standard Dose Replacement - Follow Nurse / BPA Driven Protocol, , Not Applicable, PRN, Parker Castro MD    metoclopramide (REGLAN)  injection 10 mg, 10 mg, Intravenous, Q6H PRN, Federico Cox APRN, 10 mg at 11/30/24 2022    nitroglycerin (NITROSTAT) SL tablet 0.4 mg, 0.4 mg, Sublingual, Q5 Min PRN, Parker Castro MD    ondansetron (ZOFRAN) injection 4 mg, 4 mg, Intravenous, Q6H PRN, Seven Garduno MD, 4 mg at 11/29/24 0320    oxybutynin XL (DITROPAN-XL) 24 hr tablet 10 mg, 10 mg, Oral, Daily, Parker Castro MD, 10 mg at 12/01/24 0754    pantoprazole (PROTONIX) EC tablet 40 mg, 40 mg, Oral, BID AC, Parker Castro MD, 40 mg at 12/01/24 0601    Phosphorus Replacement - Follow Nurse / BPA Driven Protocol, , Not Applicable, PRN, Parker Castro MD    polyethylene glycol (MIRALAX) packet 17 g, 17 g, Oral, Daily, Federico Cox, TRACEY    Potassium Replacement - Follow Nurse / BPA Driven Protocol, , Not Applicable, PRN, Parker Castro MD    sodium chloride 0.9 % flush 10 mL, 10 mL, Intravenous, PRN, Parker Castro MD    [COMPLETED] Insert Peripheral IV, , , Once **AND** sodium chloride 0.9 % flush 10 mL, 10 mL, Intravenous, PRN, Parker Castro MD    sodium chloride 0.9 % flush 10 mL, 10 mL, Intravenous, Q12H, Parker Castro MD, 10 mL at 11/30/24 2021    sodium chloride 0.9 % flush 10 mL, 10 mL, Intravenous, PRN, Parker Castro MD    sodium chloride 0.9 % infusion 40 mL, 40 mL, Intravenous, PRN, Parker Castro MD, 100 mL at 11/28/24 0939    [Transfer Hold] tiZANidine (ZANAFLEX) tablet 2 mg, 2 mg, Oral, Nightly PRN, Parker Castro MD    Review of Systems     Constitution:  negative for chills, fatigue and fevers  Eyes:  negative for blurriness and change of vision  ENT:   negative for sore throat and voice change  Respiratory: negative for  cough and shortness of air  Cardiovascular:  Negative for chest pain or palpitations  Gastrointestinal:  negative for  See HPI  Endocrine: negative for   weight loss, unintended          Objective     Vital Signs  Temp:  [98.6 °F (37 °C)-99.6  °F (37.6 °C)] 98.6 °F (37 °C)  Heart Rate:  [72-79] 79  Resp:  [16] 16  BP: (112-139)/(50-59) 139/53  Body mass index is 34.16 kg/m².    Intake/Output Summary (Last 24 hours) at 12/1/2024 0928  Last data filed at 12/1/2024 0900  Gross per 24 hour   Intake 600 ml   Output --   Net 600 ml     I/O this shift:  In: 360 [P.O.:360]  Out: -        Physical Exam:   General: patient awake, alert and cooperative   Eyes: Normal lids and lashes, no scleral icterus   Neck: supple, normal ROM   Skin: warm and dry, not jaundiced   Cardiovascular: regular rhythm and rate, no murmurs auscultated   Pulm: clear to auscultation bilaterally, regular and unlabored   Abdomen: soft, nontender, nondistended; normal bowel sounds   Rectal: deferred   Extremities: no rash or edema   Psychiatric: Normal mood and behavior; memory intact         Results Review:    I have reviewed all of the patients current test results  Results from last 7 days   Lab Units 11/30/24  0618 11/29/24  1224 11/29/24  0222 11/28/24  1252 11/28/24  0511   WBC 10*3/mm3 8.11  --  7.40  --  6.72   HEMOGLOBIN g/dL 10.8* 10.7* 10.5*   < > 10.6*   HEMATOCRIT % 34.2 33.4* 33.3*   < > 33.7*   PLATELETS 10*3/mm3 110*  --  153  --  115*    < > = values in this interval not displayed.       Results from last 7 days   Lab Units 11/30/24  1604 11/30/24  0618 11/29/24  1224 11/29/24  0222 11/28/24  0511   SODIUM mmol/L  --  139  --  135* 137   POTASSIUM mmol/L 4.1 3.5 4.1 3.5 3.8   CHLORIDE mmol/L  --  102  --  99 102   CO2 mmol/L  --  26.0  --  28.0 21.0*   BUN mg/dL  --  19  --  14 14   CREATININE mg/dL  --  0.60  --  0.66 0.65   CALCIUM mg/dL  --  8.5*  --  8.7 8.9   BILIRUBIN mg/dL  --  0.5  --  0.6 0.8   ALK PHOS U/L  --  68  --  74 76   ALT (SGPT) U/L  --  47*  --  58* 42*   AST (SGOT) U/L  --  62*  --  112* 64*   GLUCOSE mg/dL  --  127*  --  112* 148*       Results from last 7 days   Lab Units 11/27/24  1205   INR  1.25*       Lab Results   Lab Value Date/Time    LIPASE 28  12/24/2023 2550       Radiology:    Imaging Results (Last 24 Hours)       ** No results found for the last 24 hours. 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  Explained to patient this was not a good long-term medication due to associated side effects that can occur.  Diet advanced to soft diet.  Patient encouraged to sit in bedside chair and ambulate is much as possible for gut stimulation.  EGD last week did not reveal cause for persistent nausea.  Question the role of bowel movements.  She is not on lactulose outpatient, I will hold on initiation of medication at this time and will start daily Miralax.        Electronically signed by TRACEY Harry, 12/01/24, 9:28 AM CST.       TRACEY Harry  12/01/24  09:28 CST

## 2024-12-01 NOTE — PLAN OF CARE
Goal Outcome Evaluation:  Plan of Care Reviewed With: patient        Progress: improving  Outcome Evaluation: Pt resting most of the night. C/o nausea at start of shift, prn reglan given with relief. No c/o pain. Up ad kevin, voiding. Rash to torso, arms, and legs barely visible now. Tolerating clear liquids. Safety maintained.

## 2024-12-02 ENCOUNTER — READMISSION MANAGEMENT (OUTPATIENT)
Dept: CALL CENTER | Facility: HOSPITAL | Age: 74
End: 2024-12-02
Payer: MEDICARE

## 2024-12-02 VITALS
HEART RATE: 83 BPM | BODY MASS INDEX: 33.97 KG/M2 | HEIGHT: 64 IN | RESPIRATION RATE: 16 BRPM | SYSTOLIC BLOOD PRESSURE: 122 MMHG | WEIGHT: 199 LBS | OXYGEN SATURATION: 93 % | TEMPERATURE: 98.1 F | DIASTOLIC BLOOD PRESSURE: 70 MMHG

## 2024-12-02 LAB
ANION GAP SERPL CALCULATED.3IONS-SCNC: 12 MMOL/L (ref 5–15)
BUN SERPL-MCNC: 19 MG/DL (ref 8–23)
BUN/CREAT SERPL: 33.9 (ref 7–25)
CALCIUM SPEC-SCNC: 8.7 MG/DL (ref 8.6–10.5)
CHLORIDE SERPL-SCNC: 98 MMOL/L (ref 98–107)
CO2 SERPL-SCNC: 24 MMOL/L (ref 22–29)
CREAT SERPL-MCNC: 0.56 MG/DL (ref 0.57–1)
DEPRECATED RDW RBC AUTO: 50.4 FL (ref 37–54)
EGFRCR SERPLBLD CKD-EPI 2021: 96.5 ML/MIN/1.73
ERYTHROCYTE [DISTWIDTH] IN BLOOD BY AUTOMATED COUNT: 15.9 % (ref 12.3–15.4)
GLUCOSE SERPL-MCNC: 114 MG/DL (ref 65–99)
HCT VFR BLD AUTO: 34.6 % (ref 34–46.6)
HGB BLD-MCNC: 11.4 G/DL (ref 12–15.9)
MCH RBC QN AUTO: 29.3 PG (ref 26.6–33)
MCHC RBC AUTO-ENTMCNC: 32.9 G/DL (ref 31.5–35.7)
MCV RBC AUTO: 88.9 FL (ref 79–97)
PLATELET # BLD AUTO: 125 10*3/MM3 (ref 140–450)
PMV BLD AUTO: 10.1 FL (ref 6–12)
POTASSIUM SERPL-SCNC: 3.4 MMOL/L (ref 3.5–5.2)
RBC # BLD AUTO: 3.89 10*6/MM3 (ref 3.77–5.28)
SODIUM SERPL-SCNC: 134 MMOL/L (ref 136–145)
WBC NRBC COR # BLD AUTO: 11.05 10*3/MM3 (ref 3.4–10.8)

## 2024-12-02 PROCEDURE — 85027 COMPLETE CBC AUTOMATED: CPT | Performed by: NURSE PRACTITIONER

## 2024-12-02 PROCEDURE — 25010000002 METOCLOPRAMIDE PER 10 MG: Performed by: NURSE PRACTITIONER

## 2024-12-02 PROCEDURE — 80048 BASIC METABOLIC PNL TOTAL CA: CPT | Performed by: NURSE PRACTITIONER

## 2024-12-02 PROCEDURE — 99232 SBSQ HOSP IP/OBS MODERATE 35: CPT | Performed by: NURSE PRACTITIONER

## 2024-12-02 RX ORDER — CEFDINIR 300 MG/1
300 CAPSULE ORAL EVERY 12 HOURS SCHEDULED
Qty: 8 CAPSULE | Refills: 0 | Status: SHIPPED | OUTPATIENT
Start: 2024-12-02 | End: 2024-12-06

## 2024-12-02 RX ORDER — POTASSIUM CHLORIDE 1500 MG/1
40 TABLET, EXTENDED RELEASE ORAL EVERY 4 HOURS
Status: COMPLETED | OUTPATIENT
Start: 2024-12-02 | End: 2024-12-02

## 2024-12-02 RX ORDER — CARVEDILOL 3.12 MG/1
3.12 TABLET ORAL 2 TIMES DAILY WITH MEALS
Qty: 60 TABLET | Refills: 0 | Status: SHIPPED | OUTPATIENT
Start: 2024-12-02 | End: 2025-01-01

## 2024-12-02 RX ORDER — CEFDINIR 300 MG/1
300 CAPSULE ORAL EVERY 12 HOURS SCHEDULED
Status: DISCONTINUED | OUTPATIENT
Start: 2024-12-02 | End: 2024-12-02 | Stop reason: HOSPADM

## 2024-12-02 RX ORDER — MAGNESIUM CARB/ALUMINUM HYDROX 105-160MG
150 TABLET,CHEWABLE ORAL ONCE
Status: COMPLETED | OUTPATIENT
Start: 2024-12-02 | End: 2024-12-02

## 2024-12-02 RX ORDER — POLYETHYLENE GLYCOL 3350 17 G/17G
17 POWDER, FOR SOLUTION ORAL DAILY
Start: 2024-12-03

## 2024-12-02 RX ORDER — TIOTROPIUM BROMIDE AND OLODATEROL 3.124; 2.736 UG/1; UG/1
2 SPRAY, METERED RESPIRATORY (INHALATION) DAILY PRN
Start: 2024-12-02

## 2024-12-02 RX ORDER — ONDANSETRON 4 MG/1
4 TABLET, ORALLY DISINTEGRATING ORAL EVERY 8 HOURS PRN
Qty: 15 TABLET | Refills: 0 | Status: SHIPPED | OUTPATIENT
Start: 2024-12-02

## 2024-12-02 RX ORDER — PANTOPRAZOLE SODIUM 40 MG/1
40 TABLET, DELAYED RELEASE ORAL
Qty: 120 TABLET | Refills: 0 | Status: SHIPPED | OUTPATIENT
Start: 2024-12-02 | End: 2025-01-31

## 2024-12-02 RX ADMIN — POTASSIUM CHLORIDE 40 MEQ: 1500 TABLET, EXTENDED RELEASE ORAL at 12:44

## 2024-12-02 RX ADMIN — POTASSIUM CHLORIDE 40 MEQ: 1500 TABLET, EXTENDED RELEASE ORAL at 08:50

## 2024-12-02 RX ADMIN — DIPHENHYDRAMINE HYDROCHLORIDE, ZINC ACETATE 1 APPLICATION: 2; .1 CREAM TOPICAL at 04:53

## 2024-12-02 RX ADMIN — Medication 10 ML: at 08:50

## 2024-12-02 RX ADMIN — LEVOTHYROXINE SODIUM 112 MCG: 112 TABLET ORAL at 05:52

## 2024-12-02 RX ADMIN — METOCLOPRAMIDE 10 MG: 5 INJECTION, SOLUTION INTRAMUSCULAR; INTRAVENOUS at 05:55

## 2024-12-02 RX ADMIN — PANTOPRAZOLE SODIUM 40 MG: 40 TABLET, DELAYED RELEASE ORAL at 08:49

## 2024-12-02 RX ADMIN — CARVEDILOL 3.12 MG: 3.12 TABLET, FILM COATED ORAL at 08:49

## 2024-12-02 RX ADMIN — POLYETHYLENE GLYCOL 3350 17 G: 17 POWDER, FOR SOLUTION ORAL at 08:50

## 2024-12-02 RX ADMIN — OXYBUTYNIN CHLORIDE 10 MG: 5 TABLET, EXTENDED RELEASE ORAL at 08:49

## 2024-12-02 RX ADMIN — MAGNESIUM CITRATE 150 ML: 1.75 LIQUID ORAL at 09:45

## 2024-12-02 NOTE — PROGRESS NOTES
[facility]  Inpatient Progress Note      Date of Admission: 11/27/2024  Date of Service:  12/02/24    Reason for Follow Up: Rectal bleeding/nausea    Subjective     Subjective:   Patient sitting up in bedside chair, no family present at bedside.  Reports improvement in nausea compared to yesterday.  No emesis.  Very small bowel movement consisting of mucus last evening.  No signs of GI blood loss.  Patient reports decreased appetite and not feeling very hungry.    Current Facility-Administered Medications:     acetaminophen (TYLENOL) tablet 650 mg, 650 mg, Oral, Q4H PRN, 650 mg at 12/01/24 2311 **OR** acetaminophen (TYLENOL) 160 MG/5ML oral solution 650 mg, 650 mg, Oral, Q4H PRN **OR** acetaminophen (TYLENOL) suppository 650 mg, 650 mg, Rectal, Q4H PRN, Parker Castro MD    [Held by provider] apixaban (ELIQUIS) tablet 2.5 mg, 2.5 mg, Oral, Q12H, Parker Castro MD    sennosides-docusate (PERICOLACE) 8.6-50 MG per tablet 2 tablet, 2 tablet, Oral, BID PRN **AND** polyethylene glycol (MIRALAX) packet 17 g, 17 g, Oral, Daily PRN **AND** bisacodyl (DULCOLAX) EC tablet 5 mg, 5 mg, Oral, Daily PRN **AND** bisacodyl (DULCOLAX) suppository 10 mg, 10 mg, Rectal, Daily PRN, Parker Castro MD    Calcium Replacement - Follow Nurse / BPA Driven Protocol, , Not Applicable, PRN, Parker Castro MD    carvedilol (COREG) tablet 3.125 mg, 3.125 mg, Oral, BID With Meals, Parker Castro MD, 3.125 mg at 12/02/24 0803    diphenhydrAMINE-zinc acetate 2-0.1 % cream 1 Application, 1 Application, Topical, TID PRN, Ramirez, Viji, APRN, 1 Application at 12/02/24 0453    hydrOXYzine (ATARAX) tablet 25 mg, 25 mg, Oral, Q4H PRN, Seven Garduno MD, 25 mg at 11/29/24 2233    levothyroxine (SYNTHROID, LEVOTHROID) tablet 112 mcg, 112 mcg, Oral, Q AM, Parker Castro MD, 112 mcg at 12/02/24 0552    magnesium citrate solution 150 mL, 150 mL, Oral, Once, Federico Cox, TRACEY    Magnesium  Standard Dose Replacement - Follow Nurse / BPA Driven Protocol, , Not Applicable, PRN, Parker Castro MD    metoclopramide (REGLAN) injection 10 mg, 10 mg, Intravenous, Q6H PRN, Federico Cox APRN, 10 mg at 12/02/24 0555    nitroglycerin (NITROSTAT) SL tablet 0.4 mg, 0.4 mg, Sublingual, Q5 Min PRN, Parker Castro MD    ondansetron (ZOFRAN) injection 4 mg, 4 mg, Intravenous, Q6H PRN, Seven Garduno MD, 4 mg at 11/29/24 0320    oxybutynin XL (DITROPAN-XL) 24 hr tablet 10 mg, 10 mg, Oral, Daily, Parker Castro MD, 10 mg at 12/02/24 0849    pantoprazole (PROTONIX) EC tablet 40 mg, 40 mg, Oral, BID AC, Parker Castro MD, 40 mg at 12/02/24 0849    Phosphorus Replacement - Follow Nurse / BPA Driven Protocol, , Not Applicable, PRN, Parker Castro MD    polyethylene glycol (MIRALAX) packet 17 g, 17 g, Oral, Daily, Federico Cox APRN, 17 g at 12/02/24 0850    potassium chloride (KLOR-CON M20) CR tablet 40 mEq, 40 mEq, Oral, Q4H, Ruben Mathis DO, 40 mEq at 12/02/24 0850    Potassium Replacement - Follow Nurse / BPA Driven Protocol, , Not Applicable, PRN, Parker Castro MD    sodium chloride 0.9 % flush 10 mL, 10 mL, Intravenous, PRN, Parker Castro MD    [COMPLETED] Insert Peripheral IV, , , Once **AND** sodium chloride 0.9 % flush 10 mL, 10 mL, Intravenous, PRN, Parker Castro MD    sodium chloride 0.9 % flush 10 mL, 10 mL, Intravenous, Q12H, Parker Castro MD, 10 mL at 12/02/24 0850    sodium chloride 0.9 % flush 10 mL, 10 mL, Intravenous, PRN, Parker Castro MD    sodium chloride 0.9 % infusion 40 mL, 40 mL, Intravenous, PRN, Parker Csatro MD, 100 mL at 11/28/24 0939    [Transfer Hold] tiZANidine (ZANAFLEX) tablet 2 mg, 2 mg, Oral, Nightly PRN, Parker Castro MD    Review of Systems     Constitution:  negative for chills, fatigue and fevers  Eyes:  negative for blurriness and change of vision  ENT:   negative for sore throat and  voice change  Respiratory: negative for  cough and shortness of air  Cardiovascular:  Negative for chest pain or palpitations  Gastrointestinal:  negative for  See HPI  Endocrine: negative for   weight loss, unintended          Objective     Vital Signs  Temp:  [97.8 °F (36.6 °C)-99.2 °F (37.3 °C)] 98.1 °F (36.7 °C)  Heart Rate:  [61-87] 83  Resp:  [16] 16  BP: (114-124)/(51-71) 122/70  Body mass index is 34.16 kg/m².  No intake or output data in the 24 hours ending 12/02/24 0900  No intake/output data recorded.       Physical Exam:   General: patient awake, alert and cooperative   Eyes: Normal lids and lashes, no scleral icterus   Neck: supple, normal ROM   Skin: warm and dry, not jaundiced   Cardiovascular: regular rhythm and rate, no murmurs auscultated   Pulm: clear to auscultation bilaterally, regular and unlabored   Abdomen: soft, nontender, nondistended; normal bowel sounds   Rectal: deferred   Extremities: no rash or edema   Psychiatric: Normal mood and behavior; memory intact         Results Review:    I have reviewed all of the patients current test results  Results from last 7 days   Lab Units 12/02/24  0615 11/30/24  0618 11/29/24  1224 11/29/24 0222   WBC 10*3/mm3 11.05* 8.11  --  7.40   HEMOGLOBIN g/dL 11.4* 10.8* 10.7* 10.5*   HEMATOCRIT % 34.6 34.2 33.4* 33.3*   PLATELETS 10*3/mm3 125* 110*  --  153       Results from last 7 days   Lab Units 12/02/24  0615 11/30/24  1604 11/30/24  0618 11/29/24  1224 11/29/24  0222 11/28/24  0511   SODIUM mmol/L 134*  --  139  --  135* 137   POTASSIUM mmol/L 3.4* 4.1 3.5   < > 3.5 3.8   CHLORIDE mmol/L 98  --  102  --  99 102   CO2 mmol/L 24.0  --  26.0  --  28.0 21.0*   BUN mg/dL 19  --  19  --  14 14   CREATININE mg/dL 0.56*  --  0.60  --  0.66 0.65   CALCIUM mg/dL 8.7  --  8.5*  --  8.7 8.9   BILIRUBIN mg/dL  --   --  0.5  --  0.6 0.8   ALK PHOS U/L  --   --  68  --  74 76   ALT (SGPT) U/L  --   --  47*  --  58* 42*   AST (SGOT) U/L  --   --  62*  --  112* 64*    GLUCOSE mg/dL 114*  --  127*  --  112* 148*    < > = values in this interval not displayed.       Results from last 7 days   Lab Units 11/27/24  1205   INR  1.25*       Lab Results   Lab Value Date/Time    LIPASE 28 12/24/2023 1502       Radiology:    Imaging Results (Last 24 Hours)       ** No results found for the last 24 hours. 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  No signs of active GI blood loss.  Scheduled Reglan was initiated yesterday.  Conversation once again regarding this not being appropriate long-term medication due to potential side effects.  Diet has been advanced.  I have ordered a dose of magnesium citrate for today to hopefully induce a large bowel movement as I do question the role of her bowels in complaints of nausea.  No significant finding on endoscopy to explain sudden onset of nausea.        Electronically signed by TRACEY Harry, 12/02/24, 9:00 AM CST.       TRACEY Harry  12/02/24  09:00 CST

## 2024-12-02 NOTE — DISCHARGE SUMMARY
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 Oconnell with GI.    Procedures Performed:     EGD on 11/28/2024 with identification of grade 2 varices. Varices were banded by Dr. Oconnell.    Pertinent Test Results:   Results for orders placed during the hospital encounter of 04/30/24    Adult Stress Echo W/ Cont or Stress Agent if Necessary Per Protocol    Interpretation Summary    Normal stress echo with no significant echocardiographic evidence for myocardial ischemia consistent with a low risk study for myocardial ischemia.    Left ventricular systolic function is normal.      Imaging Results (All)       Procedure Component Value Units Date/Time    CT Angiogram Abdomen Pelvis [[id number]] Collected: 11/27/24 1427     Updated: 11/27/24 1440    Narrative:      CT ANGIOGRAM ABDOMEN PELVIS- 11/27/2024 1:07 PM     HISTORY: Bright red blood per rectum     COMPARISON: Abdominal CT dated 12/25/2023     DOSE LENGTH PRODUCT: 1602.05 mGy.cm. Automated exposure control was also  utilized to decrease patient radiation dose.     TECHNIQUE: Helical tomographic images of the abdomen and pelvis  utilizing angiographic protocol were obtained following the intravenous  infusion of contrast. Multiplanar and 3 D reformatted images were  provided for review.     FINDINGS:     ANGIOGRAM:     Abdominal aorta is normal in caliber. Moderate atheromatous  calcification along the abdominal aorta. No dissection or flow-limiting  stenosis. No flow-limiting stenosis at the origin of the celiac  or  superior mesenteric arteries. CECE is patent. No flow-limiting stenosis  at the renal artery origins.     Calcified atheromatous plaque along the common iliac arteries. Mild  atheromatous narrowing along the left common iliac artery. Moderate to  high-grade atheromatous narrowing at the origin of the left internal  iliac artery. External iliac arteries are patent and normal in caliber  with no flow-limiting stenosis. Included portions of the bilateral  superficial and deep femoral arteries are patent.        OTHER FINDINGS:     LOWER CHEST: Previous pneumonectomy on the left with right to left  mediastinal shift. No consolidation of the right lung base.     LIVER: Liver surface contour appears slightly nodular. There may be a  recanalized periumbilical vein as well. The main portal vein appears  dilated. No focal lesion identified. Hepatic veins are patent.     BILIARY SYSTEM: Prior cholecystectomy. Biliary tree is nondilated..     PANCREAS: No focal lesion. Main duct is nondilated.     SPLEEN: Enlarged, measuring 17.4 cm craniocaudal.     KIDNEYS AND ADRENALS: Adrenal glands are unremarkable. Kidneys are  unremarkable. The ureters are decompressed and normal in appearance.     RETROPERITONEUM: No mass, lymphadenopathy or hemorrhage.     GI TRACT: Lower paraesophageal and gastric varices. No arterial contrast  extravasation or delayed contrast pooling. Small bowel loops are  nondilated.  No inflammatory changes are seen along the colon. Fairly  extensive colonic diverticulosis. No acute diverticulitis. Again, there  is no active extravasation or intraluminal contrast pooling identified  in the colon.     OTHER: There is no mesenteric mass, lymphadenopathy, free fluid or free  air. No acute bony abnormality. Lumbar facet arthropathy.     PELVIS: No mass lesion, fluid collection or significant lymphadenopathy  is seen in the pelvis. The urinary bladder is normal in appearance.       Impression:      1. No arterial  contrast extravasation or delayed intraluminal contrast  pooling to suggest active brisk GI bleed.  2. Exam is concerning for liver cirrhosis with sequelae of portal  hypertension including splenomegaly and lower esophageal and gastric  varices, a potential source for bleed although no active bleed is seen.  3. Fairly extensive diverticulosis along the sigmoid colon as well which  could be a potential source for bleed although no active bleed is seen.  No acute diverticulitis.        This report was signed and finalized on 11/27/2024 2:36 PM by Dr Raheel Kate.             LAB RESULTS:      Lab 12/02/24  0615 11/30/24  0618 11/29/24  1224 11/29/24  0222 11/28/24  1252 11/28/24  0511 11/27/24  1506 11/27/24  1205   WBC 11.05* 8.11  --  7.40  --  6.72  --  6.82   HEMOGLOBIN 11.4* 10.8* 10.7* 10.5* 10.3* 10.6*  --  11.2*   HEMATOCRIT 34.6 34.2 33.4* 33.3* 32.0* 33.7*  --  34.6   PLATELETS 125* 110*  --  153  --  115*  --  144   NEUTROS ABS  --   --   --   --   --   --   --  5.12   IMMATURE GRANS (ABS)  --   --   --   --   --   --   --  0.02   LYMPHS ABS  --   --   --   --   --   --   --  1.01   MONOS ABS  --   --   --   --   --   --   --  0.56   EOS ABS  --   --   --   --   --   --   --  0.09   MCV 88.9 91.0  --  91.0  --  91.1  --  89.9   SED RATE  --   --   --   --   --   --   --  25   CRP  --   --   --   --   --   --   --  0.81*   LACTATE  --   --   --   --   --   --  1.2 2.8*   PROTIME  --   --   --   --   --   --   --  16.2*   APTT  --   --   --   --   --   --   --  34.5         Lab 12/02/24  0615 11/30/24  1604 11/30/24  0618 11/29/24  1224 11/29/24  0222 11/28/24  0511 11/27/24  1205   SODIUM 134*  --  139  --  135* 137 137   POTASSIUM 3.4* 4.1 3.5 4.1 3.5 3.8 3.8   CHLORIDE 98  --  102  --  99 102 101   CO2 24.0  --  26.0  --  28.0 21.0* 21.0*   ANION GAP 12.0  --  11.0  --  8.0 14.0 15.0   BUN 19  --  19  --  14 14 10   CREATININE 0.56*  --  0.60  --  0.66 0.65 0.67   EGFR 96.5  --  94.9  --  92.8 93.1 92.4    GLUCOSE 114*  --  127*  --  112* 148* 134*   CALCIUM 8.7  --  8.5*  --  8.7 8.9 8.7   MAGNESIUM  --   --   --   --   --   --  1.8         Lab 11/30/24  0618 11/29/24  0222 11/28/24  0511 11/27/24  1205   TOTAL PROTEIN 7.5 7.7 7.9 8.2   ALBUMIN 4.1 4.0 4.1 4.2   GLOBULIN 3.4 3.7 3.8 4.0   ALT (SGPT) 47* 58* 42* 43*   AST (SGOT) 62* 112* 64* 67*   BILIRUBIN 0.5 0.6 0.8 0.7   ALK PHOS 68 74 76 101         Lab 11/27/24  1205   PROTIME 16.2*   INR 1.25*                 Brief Urine Lab Results  (Last result in the past 365 days)        Color   Clarity   Blood   Leuk Est   Nitrite   Protein   CREAT   Urine HCG        12/24/23 2300 Yellow   Clear   Large (3+)   Negative   Negative   Negative                 Microbiology Results (last 10 days)       ** No results found for the last 240 hours. **            Hospital Course:   Reina Palomares is a 74 y.o. 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  She presented to the ER on 11/27 with complaints of bright red blood per rectum.  She had a bowel movement which most mostly blood and very little stool and then also found a spot of blood in her bed when she woke up.  She did not have any repeated episodes of bleeding in the ER.  She denies shortness of breath, chest pain, nausea or vomiting.  Additionally she also reports a skin rash.  She states that she thinks she had a bug bite in her left lower leg in May and then eventually developed blisters in her left ankle for which she was seen by her primary care doc via telehealth visit and was prescribed valacyclovir and Bactrim that she started 11/21.  For the past few days she has developed itching and erythema on her 4 extremities and some on her torso as well. Hospital medicine services asked to admit by  "ER physician after case discussed with case with GI physician who recommends observation. In the ER, patient hemodynamically stable and hemoglobin 11.     GI bleed. CT angio of the abdomen: No arterial contrast extravasation to suggest active brisk GI bleed.  Liver cirrhosis with sequela of portal hypertension, splenomegaly, lower esophageal and gastric varices.  Extensive diverticulosis of sigmoid colon.  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 Eliquis on hold with last dose 11/26 through 12/1.  Held for 5 days.  Okay to resume Eliquis.     Overall patient states she is feeling better.  No signs of bleeding.  Hemoglobin stable.  Tolerating diet.  She feels ready for discharge home.  She has reached maximum benefit of hospitalization.  She is medically stable and appropriate for discharge today.    Physical Exam on Discharge:  /70 (BP Location: Left arm, Patient Position: Lying)   Pulse 83   Temp 98.1 °F (36.7 °C) (Oral)   Resp 16   Ht 162.6 cm (64\")   Wt 90.3 kg (199 lb)   SpO2 93%   BMI 34.16 kg/m²   Physical Exam  Vitals reviewed.   Constitutional:       General: She is not in acute distress.     Appearance: She is obese. She is not toxic-appearing.   HENT:      Head: Normocephalic and atraumatic.      Mouth/Throat:      Mouth: Mucous membranes are moist.      Pharynx: Oropharynx is clear.   Eyes:      Extraocular Movements: Extraocular movements intact.      Conjunctiva/sclera: Conjunctivae normal.      Pupils: Pupils are equal, round, and reactive to light.   Cardiovascular:      Rate and Rhythm: Normal rate and regular rhythm.      Pulses: Normal pulses.   Pulmonary:      Effort: Pulmonary effort is normal. No respiratory distress.      Breath sounds: Normal breath sounds.   Abdominal:      General: Bowel sounds are normal. There is no distension.      Palpations: Abdomen is soft.     "  Tenderness: There is no abdominal tenderness.   Musculoskeletal:         General: No swelling or tenderness. Normal range of motion.      Cervical back: Normal range of motion and neck supple. No muscular tenderness.   Skin:     General: Skin is warm and dry.      Findings: Rash (erythematous rash on arms, legs, back - improving) present.   Neurological:      General: No focal deficit present.      Mental Status: She is alert and oriented to person, place, and time.      Cranial Nerves: No cranial nerve deficit.      Motor: No weakness.   Psychiatric:         Mood and Affect: Mood normal.         Behavior: Behavior normal.     Condition on Discharge: medically stable.    Discharge Disposition:  Home or Self Care    Discharge Medications:     Discharge Medications        New Medications        Instructions Start Date   carvedilol 3.125 MG tablet  Commonly known as: COREG   3.125 mg, Oral, 2 Times Daily With Meals      cefdinir 300 MG capsule  Commonly known as: OMNICEF   300 mg, Oral, Every 12 Hours Scheduled      ondansetron ODT 4 MG disintegrating tablet  Commonly known as: ZOFRAN-ODT   4 mg, Translingual, Every 8 Hours PRN      pantoprazole 40 MG EC tablet  Commonly known as: PROTONIX   40 mg, Oral, 2 Times Daily Before Meals      polyethylene glycol 17 g packet  Commonly known as: MIRALAX   17 g, Oral, Daily, Obtain OTC   Start Date: December 3, 2024            Continue These Medications        Instructions Start Date   apixaban 2.5 MG tablet tablet  Commonly known as: ELIQUIS   2.5 mg, Every 12 Hours      levothyroxine 112 MCG tablet  Commonly known as: SYNTHROID, LEVOTHROID   112 mcg, Daily      loratadine 10 MG tablet  Commonly known as: CLARITIN   10 mg, Daily      oxybutynin XL 10 MG 24 hr tablet  Commonly known as: DITROPAN-XL   10 mg, Daily      Stiolto Respimat 2.5-2.5 MCG/ACT aerosol solution inhaler  Generic drug: tiotropium bromide-olodaterol   2 puffs, Inhalation, Daily PRN      TiZANidine 2 MG  capsule  Commonly known as: ZANAFLEX   2 mg, Oral, Every 12 Hours PRN             Discharge Diet:   Diet Instructions       Diet: Gastrointestinal Diets; Fiber-Restricted; Soft to Chew (NDD 3); Whole Meat; Thin (IDDSI 0)      Discharge Diet: Gastrointestinal Diets    Gastrointestinal Diet: Fiber-Restricted    Texture: Soft to Chew (NDD 3)    Soft to Chew: Whole Meat    Fluid Consistency: Thin (IDDSI 0)            Activity at Discharge:   Activity Instructions       Activity as Tolerated              Follow-up Appointments:   1.  Follow-up with primary care provider 1 week.  2.  Follow-up with gastroenterology in 2 to 4 weeks.  Future Appointments   Date Time Provider Department Center   12/18/2024  1:15 PM Yuliet Brennan APRN [facility]       Test Results Pending at Discharge: none.    Electronically signed by TRACEY Jovel, 12/02/24, 12:23 CST.    Time: 35 minutes.

## 2024-12-03 ENCOUNTER — NURSE TRIAGE (OUTPATIENT)
Dept: CALL CENTER | Facility: HOSPITAL | Age: 74
End: 2024-12-03
Payer: MEDICARE

## 2024-12-03 NOTE — OUTREACH NOTE
Prep Survey      Flowsheet Row Responses   [facility] facility patient discharged from? [place]   Is LACE score < 7 ? No   Eligibility Readm Mgmt   Discharge diagnosis GI bleeding-EGD this visit   Does the patient have one of the following disease processes/diagnoses(primary or secondary)? Other   Does the patient have Home health ordered? No   Is there a DME ordered? No   Prep survey completed? Yes            SUKHDEV WARREN - Registered Nurse

## 2024-12-04 NOTE — TELEPHONE ENCOUNTER
Discharged from inpatient [facility] on 12/02/24; states she is having severe abdominal pain with nausea.  Instruction provided per nini.

## 2024-12-04 NOTE — TELEPHONE ENCOUNTER
"Reason for Disposition   [1] SEVERE pain (e.g., excruciating) AND [2] present > 1 hour    Additional Information   Negative: SEVERE difficulty breathing (e.g., struggling for each breath, speaks in single words)   Negative: Shock suspected (e.g., cold/pale/clammy skin, too weak to stand, low BP, rapid pulse)   Negative: Difficult to awaken or acting confused (e.g., disoriented, slurred speech)   Negative: Passed out (i.e., lost consciousness, collapsed and was not responding)   Negative: Visible sweat on face or sweat dripping down face   Negative: Sounds like a life-threatening emergency to the triager   Negative: Followed an abdomen (stomach) injury   Negative: Chest pain   Negative: [1] Abdominal pain AND [2] pregnant < 20 weeks   Negative: [1] Abdominal pain AND [2] pregnant 20 or more weeks   Negative: [1] Abdominal pain AND [2] postpartum (from 0 to 6 weeks after delivery)   Negative: Abdomen bloating or swelling are main symptoms   Negative: [1] Pain lasts > 10 minutes AND [2] age > 50    Answer Assessment - Initial Assessment Questions  1. LOCATION: \"Where does it hurt?\"       All over stomach  2. RADIATION: \"Does the pain shoot anywhere else?\" (e.g., chest, back)      denies  3. ONSET: \"When did the pain begin?\" (e.g., minutes, hours or days ago)       Since discharged from hospital  4. SUDDEN: \"Gradual or sudden onset?\"      Sudden onselt  5. PATTERN \"Does the pain come and go, or is it constant?\"     - If it comes and goes: \"How long does it last?\" \"Do you have pain now?\"      (Note: Comes and goes means the pain is intermittent. It goes away completely between bouts.)     - If constant: \"Is it getting better, staying the same, or getting worse?\"       (Note: Constant means the pain never goes away completely; most serious pain is constant and gets worse.)       constant  6. SEVERITY: \"How bad is the pain?\"  (e.g., Scale 1-10; mild, moderate, or severe)     - MILD (1-3): Doesn't interfere with normal " "activities, abdomen soft and not tender to touch..      - MODERATE (4-7): Interferes with normal activities or awakens from sleep, abdomen tender to touch.      - SEVERE (8-10): Excruciating pain, doubled over, unable to do any normal activities.        9  7. RECURRENT SYMPTOM: \"Have you ever had this type of stomach pain before?\" If Yes, ask: \"When was the last time?\" and \"What happened that time?\"       Recently inpatient  8. AGGRAVATING FACTORS: \"Does anything seem to cause this pain?\" (e.g., foods, stress, alcohol)      denies  9. CARDIAC SYMPTOMS: \"Do you have any of the following symptoms: chest pain, difficulty breathing, sweating, nausea?\"      denies  10. OTHER SYMPTOMS: \"Do you have any other symptoms?\" (e.g., back pain, diarrhea, fever, urination pain, vomiting)        nausea  11. PREGNANCY: \"Is there any chance you are pregnant?\" \"When was your last menstrual period?\"        NA    Protocols used: Abdominal Pain - Upper-ADULT-AH    "

## 2024-12-06 ENCOUNTER — READMISSION MANAGEMENT (OUTPATIENT)
Dept: CALL CENTER | Facility: HOSPITAL | Age: 74
End: 2024-12-06
Payer: MEDICARE

## 2024-12-06 NOTE — OUTREACH NOTE
Medical Week 1 Survey      Flowsheet Row Responses   [facility] patient discharged from? [place]   Does the patient have one of the following disease processes/diagnoses(primary or secondary)? Other   Week 1 attempt successful? No   Unsuccessful attempts Attempt 1            ERVIN OLVERA - Registered Nurse

## 2024-12-10 ENCOUNTER — READMISSION MANAGEMENT (OUTPATIENT)
Dept: CALL CENTER | Facility: HOSPITAL | Age: 74
End: 2024-12-10
Payer: MEDICARE

## 2024-12-10 NOTE — OUTREACH NOTE
Medical Week 1 Survey      Flowsheet Row Responses   [facility] facility patient discharged from? [place]   Does the patient have one of the following disease processes/diagnoses(primary or secondary)? Other   Week 1 attempt successful? No   Unsuccessful attempts Attempt 2            Felipa SORIA - Registered Nurse

## 2024-12-18 ENCOUNTER — READMISSION MANAGEMENT (OUTPATIENT)
Dept: CALL CENTER | Facility: HOSPITAL | Age: 74
End: 2024-12-18
Payer: MEDICARE

## 2024-12-18 NOTE — OUTREACH NOTE
Medical Week 2 Survey      Flowsheet Row Responses   [facility], [place], operated by [facility] patient discharged from? [place]   Does the patient have one of the following disease processes/diagnoses(primary or secondary)? Other            Faith MORALES - Shay Nurse  Medical Week 3 Survey      Flowsheet Row Responses   [facility], [place], operated by [facility] patient discharged from? [place]   Does the patient have one of the following disease processes/diagnoses(primary or secondary)? Other   Week 3 attempt successful? No   Unsuccessful attempts Attempt 1            Faith MORALES - Shay Nurse

## 2025-01-23 ENCOUNTER — HOSPITAL ENCOUNTER (EMERGENCY)
Facility: HOSPITAL | Age: 75
Discharge: HOME | End: 2025-01-23
Attending: FAMILY MEDICINE
Payer: MEDICARE

## 2025-01-23 VITALS
OXYGEN SATURATION: 100 % | HEIGHT: 64 IN | TEMPERATURE: 97.1 F | SYSTOLIC BLOOD PRESSURE: 156 MMHG | DIASTOLIC BLOOD PRESSURE: 72 MMHG | WEIGHT: 176 LBS | RESPIRATION RATE: 24 BRPM | HEART RATE: 70 BPM | BODY MASS INDEX: 30.05 KG/M2

## 2025-01-23 DIAGNOSIS — R11.2 NAUSEA AND VOMITING, UNSPECIFIED VOMITING TYPE: Primary | ICD-10-CM

## 2025-01-23 LAB
ALBUMIN SERPL-MCNC: 4.1 G/DL (ref 3.5–5.2)
ALBUMIN/GLOB SERPL: 1.1 G/DL
ALP SERPL-CCNC: 59 U/L (ref 39–117)
ALT SERPL W P-5'-P-CCNC: 26 U/L (ref 1–33)
ANION GAP SERPL CALCULATED.3IONS-SCNC: 13 MMOL/L (ref 5–15)
APTT PPP: 33.6 SECONDS (ref 24.5–36)
AST SERPL-CCNC: 42 U/L (ref 1–32)
BASOPHILS # BLD AUTO: 0.04 10*3/MM3 (ref 0–0.2)
BASOPHILS NFR BLD AUTO: 0.6 % (ref 0–1.5)
BILIRUB SERPL-MCNC: 1.1 MG/DL (ref 0–1.2)
BUN SERPL-MCNC: 24 MG/DL (ref 8–23)
BUN/CREAT SERPL: 44.4 (ref 7–25)
CALCIUM SPEC-SCNC: 8.8 MG/DL (ref 8.6–10.5)
CHLORIDE SERPL-SCNC: 104 MMOL/L (ref 98–107)
CO2 SERPL-SCNC: 22 MMOL/L (ref 22–29)
CREAT SERPL-MCNC: 0.54 MG/DL (ref 0.57–1)
DEPRECATED RDW RBC AUTO: 50.4 FL (ref 37–54)
EGFRCR SERPLBLD CKD-EPI 2021: 97.4 ML/MIN/1.73
EOSINOPHIL # BLD AUTO: 0.03 10*3/MM3 (ref 0–0.4)
EOSINOPHIL NFR BLD AUTO: 0.5 % (ref 0.3–6.2)
ERYTHROCYTE [DISTWIDTH] IN BLOOD BY AUTOMATED COUNT: 15.9 % (ref 12.3–15.4)
GEN 5 1HR TROPONIN T REFLEX: 8 NG/L
GLOBULIN UR ELPH-MCNC: 3.9 GM/DL
GLUCOSE SERPL-MCNC: 117 MG/DL (ref 65–99)
HCT VFR BLD AUTO: 30.2 % (ref 34–46.6)
HGB BLD-MCNC: 9.4 G/DL (ref 12–15.9)
HOLD SPECIMEN: NORMAL
HOLD SPECIMEN: NORMAL
IMM GRANULOCYTES # BLD AUTO: 0.02 10*3/MM3 (ref 0–0.05)
IMM GRANULOCYTES NFR BLD AUTO: 0.3 % (ref 0–0.5)
INR PPP: 1.58 (ref 0.91–1.09)
LIPASE SERPL-CCNC: 16 U/L (ref 13–60)
LYMPHOCYTES # BLD AUTO: 1.1 10*3/MM3 (ref 0.7–3.1)
LYMPHOCYTES NFR BLD AUTO: 17.4 % (ref 19.6–45.3)
MAGNESIUM SERPL-MCNC: 1.9 MG/DL (ref 1.6–2.4)
MCH RBC QN AUTO: 27.2 PG (ref 26.6–33)
MCHC RBC AUTO-ENTMCNC: 31.1 G/DL (ref 31.5–35.7)
MCV RBC AUTO: 87.3 FL (ref 79–97)
MONOCYTES # BLD AUTO: 0.52 10*3/MM3 (ref 0.1–0.9)
MONOCYTES NFR BLD AUTO: 8.2 % (ref 5–12)
NEUTROPHILS NFR BLD AUTO: 4.63 10*3/MM3 (ref 1.7–7)
NEUTROPHILS NFR BLD AUTO: 73 % (ref 42.7–76)
NRBC BLD AUTO-RTO: 0 /100 WBC (ref 0–0.2)
PLATELET # BLD AUTO: 123 10*3/MM3 (ref 140–450)
PMV BLD AUTO: 10.1 FL (ref 6–12)
POTASSIUM SERPL-SCNC: 3.5 MMOL/L (ref 3.5–5.2)
PROT SERPL-MCNC: 8 G/DL (ref 6–8.5)
PROTHROMBIN TIME: 19.7 SECONDS (ref 11.8–14.8)
RBC # BLD AUTO: 3.46 10*6/MM3 (ref 3.77–5.28)
SODIUM SERPL-SCNC: 139 MMOL/L (ref 136–145)
TROPONIN T NUMERIC DELTA: -4 NG/L
TROPONIN T SERPL HS-MCNC: 12 NG/L
WBC NRBC COR # BLD AUTO: 6.34 10*3/MM3 (ref 3.4–10.8)
WHOLE BLOOD HOLD COAG: NORMAL
WHOLE BLOOD HOLD SPECIMEN: NORMAL

## 2025-01-23 PROCEDURE — 96375 TX/PRO/DX INJ NEW DRUG ADDON: CPT

## 2025-01-23 PROCEDURE — 36415 COLL VENOUS BLD VENIPUNCTURE: CPT

## 2025-01-23 PROCEDURE — 25010000002 DIPHENHYDRAMINE PER 50 MG: Performed by: FAMILY MEDICINE

## 2025-01-23 PROCEDURE — 85610 PROTHROMBIN TIME: CPT | Performed by: FAMILY MEDICINE

## 2025-01-23 PROCEDURE — 96374 THER/PROPH/DIAG INJ IV PUSH: CPT

## 2025-01-23 PROCEDURE — 85730 THROMBOPLASTIN TIME PARTIAL: CPT | Performed by: FAMILY MEDICINE

## 2025-01-23 PROCEDURE — 84484 ASSAY OF TROPONIN QUANT: CPT | Performed by: FAMILY MEDICINE

## 2025-01-23 PROCEDURE — 25010000002 PROCHLORPERAZINE 10 MG/2ML SOLUTION: Performed by: FAMILY MEDICINE

## 2025-01-23 PROCEDURE — 83690 ASSAY OF LIPASE: CPT | Performed by: FAMILY MEDICINE

## 2025-01-23 PROCEDURE — 99283 EMERGENCY DEPT VISIT LOW MDM: CPT

## 2025-01-23 PROCEDURE — 83735 ASSAY OF MAGNESIUM: CPT | Performed by: FAMILY MEDICINE

## 2025-01-23 PROCEDURE — 80053 COMPREHEN METABOLIC PANEL: CPT | Performed by: FAMILY MEDICINE

## 2025-01-23 PROCEDURE — 25010000002 LORAZEPAM PER 2 MG: Performed by: FAMILY MEDICINE

## 2025-01-23 PROCEDURE — 85025 COMPLETE CBC W/AUTO DIFF WBC: CPT | Performed by: FAMILY MEDICINE

## 2025-01-23 RX ORDER — DIPHENHYDRAMINE HYDROCHLORIDE 50 MG/ML
25 INJECTION INTRAMUSCULAR; INTRAVENOUS ONCE
Status: COMPLETED | OUTPATIENT
Start: 2025-01-23 | End: 2025-01-23

## 2025-01-23 RX ORDER — LORAZEPAM 2 MG/ML
0.5 INJECTION INTRAMUSCULAR ONCE
Status: COMPLETED | OUTPATIENT
Start: 2025-01-23 | End: 2025-01-23

## 2025-01-23 RX ORDER — PROCHLORPERAZINE MALEATE 5 MG/1
5 TABLET ORAL EVERY 8 HOURS PRN
Qty: 12 TABLET | Refills: 0 | Status: SHIPPED | OUTPATIENT
Start: 2025-01-23 | End: 2025-01-27

## 2025-01-23 RX ORDER — PROCHLORPERAZINE EDISYLATE 5 MG/ML
5 INJECTION INTRAMUSCULAR; INTRAVENOUS ONCE
Status: COMPLETED | OUTPATIENT
Start: 2025-01-23 | End: 2025-01-23

## 2025-01-23 RX ADMIN — PROCHLORPERAZINE EDISYLATE 5 MG: 5 INJECTION INTRAMUSCULAR; INTRAVENOUS at 14:16

## 2025-01-23 RX ADMIN — DIPHENHYDRAMINE HYDROCHLORIDE 25 MG: 50 INJECTION, SOLUTION INTRAMUSCULAR; INTRAVENOUS at 14:14

## 2025-01-23 RX ADMIN — LORAZEPAM 0.5 MG: 2 INJECTION INTRAMUSCULAR; INTRAVENOUS at 14:12

## 2025-01-23 NOTE — ED PROVIDER NOTES
HPI:     Patient is 74-year-old white female who presents to the emergency room with dry heaving.  Patient has a past history of esophageal varices.  She has had multiple banding procedures last banding was done yesterday at [facility].  Patient was given Zofran EMS en route that helped only mildly for her dry heaving.  Is been no blood.    REVIEW OF SYSTEMS    CONSTITUTIONAL:  No complaints of fever, chills,or weakness  EYES:  No complaints of discharge   ENT: No complaints of sore throat or ear pain  CARDIOVASCULAR:  No complaints of chest pain, palpitations, or swelling  RESPIRATORY:  No complaints of cough or shortness of breath  GI: Positive for nausea and dry heaving with no blood.    MUSCULOSKELETAL:  No complaints of back pain  SKIN:  No complaints of rash  NEUROLOGIC:  No complaints of headache, focal weakness, or sensory changes  ENDOCRINE:  No complaints of polyuria or polydipsia  LYMPHATIC:  No complaints of swollen glands  GENITOURINARY: No complaints of urinary frequency or hematuria      PAST MEDICAL HISTORY  Past Medical History:   Diagnosis Date    Anxiety     Arthritis     Blood clot in abdominal vein     Cancer     LUNG    Cirrhosis     states had blood clot neer liver and was told she had cirrhosis    GERD (gastroesophageal reflux disease)     Hyperthyroidism     Infectious viral hepatitis     H/O HEPATITIS C    Swelling of lower extremity     Varices, esophageal        FAMILY HISTORY  Family History   Problem Relation Age of Onset    Cancer Other     Coronary artery disease Other     Thyroid disease Other     Melanoma Other     Melanoma Sister         LEFT ARM    Hypothyroidism Sister     Cancer Brother     Coronary artery disease Brother     Hyperthyroidism Sister     Other Mother     Other Father     Breast cancer Neg Hx        SOCIAL HISTORY  Social History     Socioeconomic History    Marital status:    Tobacco Use    Smoking status: Former     Current  packs/day: 0.00     Types: Cigarettes     Quit date:      Years since quittin.0    Smokeless tobacco: Never    Tobacco comments:     LESS THAN 1 PPD FOR 12 YEARS   Vaping Use    Vaping status: Never Used   Substance and Sexual Activity    Alcohol use: Yes     Comment: OCCASIONAL WINE    Drug use: No    Sexual activity: Defer     Partners: Male     Birth control/protection: None       IMMUNIZATION HISTORY  Deferred to primary care physician.    SURGICAL HISTORY  Past Surgical History:   Procedure Laterality Date    CARDIAC CATHETERIZATION      CHOLECYSTECTOMY      [place]    CLAVICLE SURGERY Right     COLONOSCOPY      ENDOSCOPY N/A 2024    Procedure: ESOPHAGOGASTRODUODENOSCOPY WITH ANESTHESIA;  Surgeon: Chandrika Oconnell MD;  Location: [facility] OR;  Service: Gastroenterology;  Laterality: N/A;  pre GI bleeding  post esophageal varices banding;   pcp Dash Melendez MD    HYSTERECTOMY      TOTAL ABDOMINAL HYSTERECTOMY    KNEE ARTHROSCOPY Bilateral     LUNG REMOVAL, TOTAL Left     REPLACEMENT TOTAL KNEE Left     THYROID BIOPSY      BENIGN    TUBAL ABDOMINAL LIGATION      X2    VARICOSE VEIN SURGERY Left 2024    Procedure: LEFT LOWER EXTREMITY RADIO FREQUENCY ABLATION;  Surgeon: Elan Mathews DO;  Location:  [facility];  Service: Vascular;  Laterality: Left;       CURRENT MEDICATIONS  No current facility-administered medications for this encounter.    Current Outpatient Medications:     apixaban (ELIQUIS) 2.5 MG tablet tablet, Take 1 tablet by mouth Every 12 (Twelve) Hours., Disp: , Rfl:     carvedilol (COREG) 3.125 MG tablet, Take 1 tablet by mouth 2 (Two) Times a Day With Meals for 30 days., Disp: 60 tablet, Rfl: 0    levothyroxine (SYNTHROID, LEVOTHROID) 112 MCG tablet, Take 1 tablet by mouth Daily., Disp: , Rfl:     loratadine (CLARITIN) 10 MG tablet, Take 1 tablet by mouth Daily., Disp: , Rfl:     ondansetron ODT (ZOFRAN-ODT) 4 MG disintegrating tablet, Place 1 tablet on the tongue  "Every 8 (Eight) Hours As Needed for Nausea or Vomiting., Disp: 15 tablet, Rfl: 0    oxybutynin XL (DITROPAN-XL) 10 MG 24 hr tablet, Take 1 tablet by mouth Daily., Disp: , Rfl:     pantoprazole (PROTONIX) 40 MG EC tablet, Take 1 tablet by mouth 2 (Two) Times a Day Before Meals for 60 days., Disp: 120 tablet, Rfl: 0    polyethylene glycol 17 g packet, Take 17 g by mouth Daily. Obtain OTC, Disp: , Rfl:     prochlorperazine (COMPAZINE) 5 MG tablet, Take 1 tablet by mouth Every 8 (Eight) Hours As Needed for Nausea or Vomiting for up to 4 days., Disp: 12 tablet, Rfl: 0    tiotropium bromide-olodaterol (Stiolto Respimat) 2.5-2.5 MCG/ACT aerosol solution inhaler, Inhale 2 puffs Daily As Needed (shortness of air)., Disp: , Rfl:     TiZANidine (ZANAFLEX) 2 MG capsule, Take 1 capsule by mouth Every 12 (Twelve) Hours As Needed for Muscle Spasms., Disp: , Rfl:     ALLERGIES  Allergies   Allergen Reactions    Dilaudid [Hydromorphone Hcl] Hallucinations       ABDOMINAL EXAM    VITAL SIGNS:   /78   Pulse 71   Temp 97.1 °F (36.2 °C) (Axillary)   Resp 24   Ht 162.6 cm (64\")   Wt 79.8 kg (176 lb)   SpO2 100%   BMI 30.21 kg/m²     Constitutional: Patient is alert and in no distress.  Patient with mild epigastric discomfort.    ENT: There is a normal pharynx with no acute erythema or exudate and oral mucosa is moist.  Nose is clear with no drainage.  Tympanic membranes intact and non-erythemic    Cardiovascular: S1-S2 regular rate and rhythm. No murmurs, rubs or gallops.  Pulses are equal bilaterally and there is no pitting edema.    Respiratory: Patient is clear to auscultation bilaterally with no wheezing or rhonchi.  Chest wall is nontender.  There are no external lesions on the chest.  There is no crepitance.    Gastrointestinal: Bowel sounds normal in all 4 quadrants no abdominal distention no fluid wave.    Genitourinary: Patient is voiding appropriately.    Integument: No acute lesions noted.  Color appears to be " normal.    [place] Coma Scale: Total score 15    Neurological: Patient is alert and oriented x4 and no acute findings noted.  Speech is fluent and cognition is normal.  No evidence of acute CVA.  Cranial nerves II through XII intact.  Patient with normal motor function as well as reflexes and sensation    Psychiatric: Normal affect and mood.            RADIOLOGY/PROCEDURES        No orders to display          FUTURE APPOINTMENTS     No future appointments.           COURSE & MEDICAL DECISION MAKING       Patient's partial differential diagnosis can include:     gastroenteritis, gastritis, colitis, enteritis, volvulus, intussusception, esophageal spasms, gastroparesis, GERD, peptic ulcer disease, perforated esophagus, perforated peptic ulcer, partial bowel obstruction, bowel obstruction, irritable bowel, diverticulitis, diverticulosis, Crohn's disease, ulcerative colitis, celiac disease and others    Has not had any vomiting here laboratory tests are stable Compazine resolve the patient dry heaving patient will be discharged home.  Patient requesting Compazine.  Will give her Compazine for 4 days as needed for nausea and vomiting and dry heaving.    Patient's level of risk: Moderate       CRITICAL CARE    CRITICAL CARE: No    CRITICAL CARE TIME: None      Recent Results (from the past 24 hours)   Green Top (Gel)    Collection Time: 01/23/25  1:48 PM   Result Value Ref Range    Extra Tube Hold for add-ons.    Lavender Top    Collection Time: 01/23/25  1:48 PM   Result Value Ref Range    Extra Tube hold for add-on    Gray Top    Collection Time: 01/23/25  1:48 PM   Result Value Ref Range    Extra Tube Hold for add-ons.    Light Blue Top    Collection Time: 01/23/25  1:48 PM   Result Value Ref Range    Extra Tube Hold for add-ons.    Comprehensive Metabolic Panel    Collection Time: 01/23/25  1:48 PM    Specimen: Blood   Result Value Ref Range    Glucose 117 (H) 65 - 99 mg/dL    BUN 24 (H) 8 - 23 mg/dL    Creatinine  0.54 (L) 0.57 - 1.00 mg/dL    Sodium 139 136 - 145 mmol/L    Potassium 3.5 3.5 - 5.2 mmol/L    Chloride 104 98 - 107 mmol/L    CO2 22.0 22.0 - 29.0 mmol/L    Calcium 8.8 8.6 - 10.5 mg/dL    Total Protein 8.0 6.0 - 8.5 g/dL    Albumin 4.1 3.5 - 5.2 g/dL    ALT (SGPT) 26 1 - 33 U/L    AST (SGOT) 42 (H) 1 - 32 U/L    Alkaline Phosphatase 59 39 - 117 U/L    Total Bilirubin 1.1 0.0 - 1.2 mg/dL    Globulin 3.9 gm/dL    A/G Ratio 1.1 g/dL    BUN/Creatinine Ratio 44.4 (H) 7.0 - 25.0    Anion Gap 13.0 5.0 - 15.0 mmol/L    eGFR 97.4 >60.0 mL/min/1.73   Protime-INR    Collection Time: 01/23/25  1:48 PM    Specimen: Blood   Result Value Ref Range    Protime 19.7 (H) 11.8 - 14.8 Seconds    INR 1.58 (H) 0.91 - 1.09   aPTT    Collection Time: 01/23/25  1:48 PM    Specimen: Blood   Result Value Ref Range    PTT 33.6 24.5 - 36.0 seconds   Lipase    Collection Time: 01/23/25  1:48 PM    Specimen: Blood   Result Value Ref Range    Lipase 16 13 - 60 U/L   High Sensitivity Troponin T    Collection Time: 01/23/25  1:48 PM    Specimen: Blood   Result Value Ref Range    HS Troponin T 12 <14 ng/L   Magnesium    Collection Time: 01/23/25  1:48 PM    Specimen: Blood   Result Value Ref Range    Magnesium 1.9 1.6 - 2.4 mg/dL   CBC Auto Differential    Collection Time: 01/23/25  1:48 PM    Specimen: Blood   Result Value Ref Range    WBC 6.34 3.40 - 10.80 10*3/mm3    RBC 3.46 (L) 3.77 - 5.28 10*6/mm3    Hemoglobin 9.4 (L) 12.0 - 15.9 g/dL    Hematocrit 30.2 (L) 34.0 - 46.6 %    MCV 87.3 79.0 - 97.0 fL    MCH 27.2 26.6 - 33.0 pg    MCHC 31.1 (L) 31.5 - 35.7 g/dL    RDW 15.9 (H) 12.3 - 15.4 %    RDW-SD 50.4 37.0 - 54.0 fl    MPV 10.1 6.0 - 12.0 fL    Platelets 123 (L) 140 - 450 10*3/mm3    Neutrophil % 73.0 42.7 - 76.0 %    Lymphocyte % 17.4 (L) 19.6 - 45.3 %    Monocyte % 8.2 5.0 - 12.0 %    Eosinophil % 0.5 0.3 - 6.2 %    Basophil % 0.6 0.0 - 1.5 %    Immature Grans % 0.3 0.0 - 0.5 %    Neutrophils, Absolute 4.63 1.70 - 7.00 10*3/mm3     Lymphocytes, Absolute 1.10 0.70 - 3.10 10*3/mm3    Monocytes, Absolute 0.52 0.10 - 0.90 10*3/mm3    Eosinophils, Absolute 0.03 0.00 - 0.40 10*3/mm3    Basophils, Absolute 0.04 0.00 - 0.20 10*3/mm3    Immature Grans, Absolute 0.02 0.00 - 0.05 10*3/mm3    nRBC 0.0 0.0 - 0.2 /100 WBC   High Sensitivity Troponin T 1Hr    Collection Time: 01/23/25  2:58 PM    Specimen: Blood   Result Value Ref Range    HS Troponin T 8 <14 ng/L    Troponin T Numeric Delta -4 Abnormal if >/=3 ng/L          Old charts were reviewed per [facility] EMR.  Pertinent details are summarized above.  All laboratory, radiologic, and EKG studies that were performed in the Emergency Department were a necessary part of the evaluation needed to exclude unstable or  emergent medical conditions.     Patient was hemodynamically and neurologically stable in the ED.   Pertinent studies were reviewed as above.     The patient received:  Medications   prochlorperazine (COMPAZINE) injection 5 mg (5 mg Intravenous Given 1/23/25 1416)   LORazepam (ATIVAN) injection 0.5 mg (0.5 mg Intravenous Given 1/23/25 1412)   diphenhydrAMINE (BENADRYL) injection 25 mg (25 mg Intravenous Given 1/23/25 1414)            Diagnoses that have been ruled out:   None   Diagnoses that are still under consideration:   None   Final diagnoses:   Nausea and vomiting, unspecified vomiting type        FINAL IMPRESSION   Diagnosis Plan   1. Nausea and vomiting, unspecified vomiting type      Dry heaving            Jenaro Tan Jr, MD        ED Disposition       ED Disposition   Discharge    Condition   Stable    Comment   --                 Dragon disclaimer:  Part of this note may be an electronic transcription/translation of spoken language to printed text using the Dragon Dictation System.     I have reviewed the patient’s prescription history via a prescription monitoring program.  This information is consistent with my knowledge of the patient’s controlled substance use history.         Jenaro Tan Jr., MD  01/23/25 1547       Jenaro Tan Jr., MD  01/23/25 154

## 2025-02-05 ENCOUNTER — TELEPHONE (OUTPATIENT)
Dept: VASCULAR SURGERY | Facility: CLINIC | Age: 75
End: 2025-02-05
Payer: MEDICARE

## 2025-02-05 NOTE — TELEPHONE ENCOUNTER
PT RETURNED CALL AND WOULD LIKE TO WAIT A WHILE LONGER BEFORE SHE CALLS BACK TO RESCHEDULE DUE TO OTHER HEALTH ISSUES SHE IS TAKING CARE OF AT THIS TIME.

## 2025-02-12 ENCOUNTER — TELEPHONE (OUTPATIENT)
Dept: HEMATOLOGY | Age: 75
End: 2025-02-12

## 2025-02-12 NOTE — TELEPHONE ENCOUNTER
Called Patient and reminded patient of their appointment on 02/12/2025 and patient stated she will have to get back with us about appt due to her son being her ride and him working out of town 6 days a week.

## 2025-02-17 ENCOUNTER — TELEPHONE (OUTPATIENT)
Dept: HEMATOLOGY | Age: 75
End: 2025-02-17

## 2025-02-17 NOTE — TELEPHONE ENCOUNTER
Left voicemail with new appointment per provider RN of 3/6/25 @ 1:00- Rescheduled from 2/17/25 @ 1015- per patient request due to transportation- th

## 2025-03-03 ENCOUNTER — TELEPHONE (OUTPATIENT)
Dept: HEMATOLOGY | Age: 75
End: 2025-03-03

## 2025-03-03 NOTE — TELEPHONE ENCOUNTER
Called Patient and reminded patient of their appointment on 03/06/2025 and patient confirmed they would be here. Reminded patient to just come at appointment time, and to not come at the lab appointment time. Reminded patient that we will not check them in any more than 30 minutes before appointment time.  We have now moved to the [facility] [place] that is located between our old office and the ER at the [facility]. Letting the Pt know that our front entrance faces the  [employer] [place]. Reminded pt to eat well and be well hydrated for their labs.

## 2025-03-06 ENCOUNTER — HOSPITAL ENCOUNTER (OUTPATIENT)
Dept: INFUSION THERAPY | Age: 75
Discharge: HOME | End: 2025-03-06
Payer: MEDICARE

## 2025-03-06 ENCOUNTER — TRANSCRIBE ORDERS (OUTPATIENT)
Dept: ADMINISTRATIVE | Facility: HOSPITAL | Age: 75
End: 2025-03-06
Payer: MEDICARE

## 2025-03-06 ENCOUNTER — OFFICE VISIT (OUTPATIENT)
Dept: HEMATOLOGY | Age: 75
End: 2025-03-06
Payer: MEDICARE

## 2025-03-06 VITALS
HEART RATE: 76 BPM | DIASTOLIC BLOOD PRESSURE: 76 MMHG | HEIGHT: 64 IN | SYSTOLIC BLOOD PRESSURE: 138 MMHG | WEIGHT: 187 LBS | TEMPERATURE: 97.7 F | BODY MASS INDEX: 31.92 KG/M2 | OXYGEN SATURATION: 99 %

## 2025-03-06 DIAGNOSIS — D50.0 IRON DEFICIENCY ANEMIA DUE TO CHRONIC BLOOD LOSS: ICD-10-CM

## 2025-03-06 DIAGNOSIS — Z85.118 HISTORY OF LUNG CANCER: Primary | ICD-10-CM

## 2025-03-06 DIAGNOSIS — I85.01 IDIOPATHIC ESOPHAGEAL VARICES WITH BLEEDING (HCC): ICD-10-CM

## 2025-03-06 DIAGNOSIS — I81 PORTAL VEIN THROMBOSIS: ICD-10-CM

## 2025-03-06 DIAGNOSIS — R16.1 SPLENOMEGALY: ICD-10-CM

## 2025-03-06 DIAGNOSIS — I85.10 ESOPHAGEAL VARICES IN CIRRHOSIS (HCC): ICD-10-CM

## 2025-03-06 DIAGNOSIS — K74.60 ESOPHAGEAL VARICES IN CIRRHOSIS (HCC): ICD-10-CM

## 2025-03-06 DIAGNOSIS — C34.12 MALIGNANT NEOPLASM OF UPPER LOBE OF LEFT LUNG (HCC): Primary | ICD-10-CM

## 2025-03-06 DIAGNOSIS — C34.12 MALIGNANT NEOPLASM OF UPPER LOBE OF LEFT LUNG (HCC): ICD-10-CM

## 2025-03-06 DIAGNOSIS — K74.60 CIRRHOSIS OF LIVER WITHOUT ASCITES, UNSPECIFIED HEPATIC CIRRHOSIS TYPE (HCC): ICD-10-CM

## 2025-03-06 DIAGNOSIS — D69.6 THROMBOCYTOPENIA: ICD-10-CM

## 2025-03-06 LAB
ALBUMIN SERPL-MCNC: 4 G/DL (ref 3.5–5.2)
ALP SERPL-CCNC: 86 U/L (ref 35–104)
ALT SERPL-CCNC: 21 U/L (ref 5–33)
ANION GAP SERPL CALCULATED.3IONS-SCNC: 11 MMOL/L (ref 7–19)
AST SERPL-CCNC: 40 U/L (ref 5–32)
BASOPHILS # BLD: 0.04 K/UL (ref 0–0.2)
BASOPHILS NFR BLD: 0.9 % (ref 0–1)
BILIRUB SERPL-MCNC: 0.5 MG/DL (ref 0–1.2)
BUN SERPL-MCNC: 9 MG/DL (ref 8–23)
CALCIUM SERPL-MCNC: 8.8 MG/DL (ref 8.8–10.2)
CHLORIDE SERPL-SCNC: 102 MMOL/L (ref 98–107)
CO2 SERPL-SCNC: 26 MMOL/L (ref 22–29)
CREAT SERPL-MCNC: 0.6 MG/DL (ref 0.5–0.9)
EOSINOPHIL # BLD: 0.08 K/UL (ref 0–0.6)
EOSINOPHIL NFR BLD: 1.8 % (ref 0–5)
ERYTHROCYTE [DISTWIDTH] IN BLOOD BY AUTOMATED COUNT: 18.5 % (ref 11.5–14.5)
FERRITIN SERPL-MCNC: 33.4 NG/ML (ref 13–150)
GLUCOSE SERPL-MCNC: 122 MG/DL (ref 70–99)
HCT VFR BLD AUTO: 30.9 % (ref 37–47)
HGB BLD-MCNC: 9.5 G/DL (ref 12–16)
IRON SATN MFR SERPL: 7 % (ref 15–50)
IRON SERPL-MCNC: 30 UG/DL (ref 37–145)
LYMPHOCYTES # BLD: 0.92 K/UL (ref 1.1–4.5)
LYMPHOCYTES NFR BLD: 20.8 % (ref 20–40)
MCH RBC QN AUTO: 27.3 PG (ref 27–31)
MCHC RBC AUTO-ENTMCNC: 30.7 G/DL (ref 33–37)
MCV RBC AUTO: 88.8 FL (ref 81–99)
MONOCYTES # BLD: 0.41 K/UL (ref 0–0.9)
MONOCYTES NFR BLD: 9.3 % (ref 1–10)
NEUTROPHILS # BLD: 2.97 K/UL (ref 1.5–7.5)
NEUTS SEG NFR BLD: 67 % (ref 50–65)
PLATELET # BLD AUTO: 110 K/UL (ref 130–400)
PMV BLD AUTO: 10.6 FL (ref 9.4–12.3)
POTASSIUM SERPL-SCNC: 4 MMOL/L (ref 3.5–5.1)
PROT SERPL-MCNC: 7.7 G/DL (ref 6.4–8.3)
RBC # BLD AUTO: 3.48 M/UL (ref 4.2–5.4)
SODIUM SERPL-SCNC: 139 MMOL/L (ref 136–145)
TIBC SERPL-MCNC: 406 UG/DL (ref 250–400)
WBC # BLD AUTO: 4.43 K/UL (ref 4.8–10.8)

## 2025-03-06 PROCEDURE — 99213 OFFICE O/P EST LOW 20 MIN: CPT

## 2025-03-06 PROCEDURE — 3017F COLORECTAL CA SCREEN DOC REV: CPT | Performed by: NURSE PRACTITIONER

## 2025-03-06 PROCEDURE — 36415 COLL VENOUS BLD VENIPUNCTURE: CPT

## 2025-03-06 PROCEDURE — 82728 ASSAY OF FERRITIN: CPT

## 2025-03-06 PROCEDURE — 1126F AMNT PAIN NOTED NONE PRSNT: CPT | Performed by: NURSE PRACTITIONER

## 2025-03-06 PROCEDURE — 1123F ACP DISCUSS/DSCN MKR DOCD: CPT | Performed by: NURSE PRACTITIONER

## 2025-03-06 PROCEDURE — G8417 CALC BMI ABV UP PARAM F/U: HCPCS | Performed by: NURSE PRACTITIONER

## 2025-03-06 PROCEDURE — 83540 ASSAY OF IRON: CPT

## 2025-03-06 PROCEDURE — 85025 COMPLETE CBC W/AUTO DIFF WBC: CPT

## 2025-03-06 PROCEDURE — G8427 DOCREV CUR MEDS BY ELIG CLIN: HCPCS | Performed by: NURSE PRACTITIONER

## 2025-03-06 PROCEDURE — 1090F PRES/ABSN URINE INCON ASSESS: CPT | Performed by: NURSE PRACTITIONER

## 2025-03-06 PROCEDURE — G8400 PT W/DXA NO RESULTS DOC: HCPCS | Performed by: NURSE PRACTITIONER

## 2025-03-06 PROCEDURE — 1036F TOBACCO NON-USER: CPT | Performed by: NURSE PRACTITIONER

## 2025-03-06 PROCEDURE — 1159F MED LIST DOCD IN RCRD: CPT | Performed by: NURSE PRACTITIONER

## 2025-03-06 PROCEDURE — 80053 COMPREHEN METABOLIC PANEL: CPT

## 2025-03-06 PROCEDURE — 99214 OFFICE O/P EST MOD 30 MIN: CPT | Performed by: NURSE PRACTITIONER

## 2025-03-06 PROCEDURE — 83550 IRON BINDING TEST: CPT

## 2025-03-06 RX ORDER — FERROUS SULFATE 325(65) MG
1 TABLET ORAL DAILY
COMMUNITY
Start: 2025-02-17

## 2025-03-06 RX ORDER — CARVEDILOL 3.12 MG/1
3.12 TABLET ORAL 2 TIMES DAILY WITH MEALS
COMMUNITY
Start: 2024-12-02

## 2025-03-06 RX ORDER — TIZANIDINE HYDROCHLORIDE 2 MG/1
2 CAPSULE, GELATIN COATED ORAL EVERY 12 HOURS PRN
COMMUNITY
Start: 2025-01-15

## 2025-03-06 RX ORDER — OXYBUTYNIN CHLORIDE 10 MG/1
10 TABLET, EXTENDED RELEASE ORAL DAILY
COMMUNITY

## 2025-03-06 ASSESSMENT — ENCOUNTER SYMPTOMS
EYES NEGATIVE: 1
EYE PAIN: 0
RESPIRATORY NEGATIVE: 1
SHORTNESS OF BREATH: 0
DIARRHEA: 0
NAUSEA: 0
COUGH: 0
EYE REDNESS: 0
BLOOD IN STOOL: 0
BACK PAIN: 0
EYE DISCHARGE: 0
CONSTIPATION: 0
VOMITING: 0
WHEEZING: 0
SORE THROAT: 0
GASTROINTESTINAL NEGATIVE: 1
ABDOMINAL PAIN: 0

## 2025-03-06 NOTE — PROGRESS NOTES
HYSTERECTOMY (CERVIX STATUS UNKNOWN)      JOINT REPLACEMENT Left     left knee replacement    KNEE SURGERY Right     LUNG REMOVAL, TOTAL Left     MA EXC LESION EYELID W/O CLSR/W/SIMPLE DIR CLOSURE Bilateral 2018    blepharoplasty performed by Jung Li MD at [facility] OR    TUBAL LIGATION         Current Medications:    Current Outpatient Medications   Medication Sig Dispense Refill    carvedilol (COREG) 3.125 MG tablet Take 1 tablet by mouth 2 times daily (with meals)      FEROSUL 325 (65 Fe) MG tablet Take 1 tablet by mouth daily      oxyBUTYnin (DITROPAN-XL) 10 MG extended release tablet Take 1 tablet by mouth daily      tiZANidine (ZANAFLEX) 2 MG capsule Take 1 capsule by mouth every 12 hours as needed for Muscle spasms      apixaban (ELIQUIS) 2.5 MG TABS tablet Take 1 tablet by mouth 2 times daily 180 tablet 0    trospium (SANCTURA) 20 MG tablet Take 1 tablet by mouth daily      ondansetron (ZOFRAN-ODT) 4 MG disintegrating tablet Take 1 tablet by mouth 3 times daily as needed for Nausea or Vomiting 21 tablet 0    propranolol (INDERAL) 10 MG tablet Take 1 tablet by mouth 2 times daily      levothyroxine (SYNTHROID) 112 MCG tablet Take 1 tablet by mouth Daily      spironolactone (ALDACTONE) 25 MG tablet Take 1 tablet by mouth every other day (Patient not taking: Reported on 3/6/2025)       No current facility-administered medications for this visit.        Allergies:   Allergies   Allergen Reactions    Dilaudid [Hydromorphone Hcl]      hallucinations      Mirabegron Hives       Social History:    Social History     Tobacco Use    Smoking status: Former     Current packs/day: 0.00     Types: Cigarettes     Quit date: 2008     Years since quittin.1    Smokeless tobacco: Never   Vaping Use    Vaping status: Never Used   Substance Use Topics    Alcohol use: Yes     Comment: Rarely    Drug use: No       Family History:   Family History   Problem Relation Age of Onset    Other Mother         bled  and Rhythm: Normal rate and regular rhythm.      Pulses: Normal pulses.      Heart sounds: Normal heart sounds.   Pulmonary:      Effort: Pulmonary effort is normal. No respiratory distress.      Breath sounds: Normal breath sounds. No wheezing or rales.   Chest:      Chest wall: No tenderness.   Abdominal:      General: Bowel sounds are normal. There is no distension.      Palpations: Abdomen is soft. There is no mass.      Tenderness: There is no abdominal tenderness. There is no guarding.   Musculoskeletal:         General: No tenderness or deformity.      Cervical back: Normal range of motion and neck supple.      Comments: Range of motion within normal limits x4 extremities   Skin:     General: Skin is warm.      Findings: No bruising, erythema or rash.   Neurological:      Mental Status: She is alert and oriented to person, place, and time.      Cranial Nerves: No cranial nerve deficit.      Coordination: Coordination normal.   Psychiatric:         Behavior: Behavior normal.         Thought Content: Thought content normal.       Labs reviewed today:  Lab Results   Component Value Date    WBC 4.43 (L) 03/06/2025    HGB 9.5 (L) 03/06/2025    HCT 30.9 (L) 03/06/2025    MCV 88.8 03/06/2025     (L) 03/06/2025     Lab Results   Component Value Date    NEUTROABS 2.97 03/06/2025     Lab Results   Component Value Date     03/06/2025    K 4.0 03/06/2025     03/06/2025    CO2 26 03/06/2025    BUN 9 03/06/2025    CREATININE 0.6 03/06/2025    GLUCOSE 122 (H) 03/06/2025    CALCIUM 8.8 03/06/2025    BILITOT 0.5 03/06/2025    ALKPHOS 86 03/06/2025    AST 40 (H) 03/06/2025    ALT 21 03/06/2025    LABGLOM >90 03/06/2025    GLOB 4.3 11/17/2023     Assessment & Plan  1. Thrombocytopenia.      2. Non-small cell lung carcinoma.  An annual screening lung scan is due and will be scheduled accordingly.    3. Hair loss.  She reports hair loss, which may be related to her thyroid medication. Recent blood work showed

## 2025-03-07 ENCOUNTER — TELEPHONE (OUTPATIENT)
Dept: HEMATOLOGY | Age: 75
End: 2025-03-07

## 2025-03-07 DIAGNOSIS — K90.9 IRON MALABSORPTION: ICD-10-CM

## 2025-03-07 DIAGNOSIS — D50.8 IRON DEFICIENCY ANEMIA SECONDARY TO INADEQUATE DIETARY IRON INTAKE: Primary | ICD-10-CM

## 2025-03-07 RX ORDER — SODIUM CHLORIDE 9 MG/ML
INJECTION, SOLUTION INTRAVENOUS CONTINUOUS
OUTPATIENT
Start: 2025-03-12

## 2025-03-07 RX ORDER — EPINEPHRINE 1 MG/ML
0.3 INJECTION, SOLUTION, CONCENTRATE INTRAVENOUS PRN
OUTPATIENT
Start: 2025-03-12

## 2025-03-07 RX ORDER — DIPHENHYDRAMINE HYDROCHLORIDE 50 MG/ML
50 INJECTION INTRAMUSCULAR; INTRAVENOUS
OUTPATIENT
Start: 2025-03-12

## 2025-03-07 RX ORDER — SODIUM CHLORIDE 9 MG/ML
5-250 INJECTION, SOLUTION INTRAVENOUS PRN
OUTPATIENT
Start: 2025-03-12

## 2025-03-07 RX ORDER — SODIUM CHLORIDE 0.9 % (FLUSH) 0.9 %
5-40 SYRINGE (ML) INJECTION PRN
OUTPATIENT
Start: 2025-03-12

## 2025-03-07 RX ORDER — HYDROCORTISONE SODIUM SUCCINATE 100 MG/2ML
100 INJECTION INTRAMUSCULAR; INTRAVENOUS
OUTPATIENT
Start: 2025-03-12

## 2025-03-07 RX ORDER — ONDANSETRON 2 MG/ML
8 INJECTION INTRAMUSCULAR; INTRAVENOUS
OUTPATIENT
Start: 2025-03-12

## 2025-03-07 RX ORDER — FAMOTIDINE 10 MG/ML
20 INJECTION, SOLUTION INTRAVENOUS
OUTPATIENT
Start: 2025-03-12

## 2025-03-07 RX ORDER — HEPARIN SODIUM (PORCINE) LOCK FLUSH IV SOLN 100 UNIT/ML 100 UNIT/ML
500 SOLUTION INTRAVENOUS PRN
OUTPATIENT
Start: 2025-03-12

## 2025-03-07 RX ORDER — ALBUTEROL SULFATE 90 UG/1
4 INHALANT RESPIRATORY (INHALATION) PRN
OUTPATIENT
Start: 2025-03-12

## 2025-03-07 RX ORDER — ACETAMINOPHEN 325 MG/1
650 TABLET ORAL
OUTPATIENT
Start: 2025-03-12

## 2025-03-07 NOTE — TELEPHONE ENCOUNTER
Called patient and reviewed lab results, iron saturation of 7%.  Instructed that COLTON Malcolm would like for her to get 2 doses of IV iron.  Instructed that she will get this at our infusion center and they will call her to set up appointments once the treatment has been approved with insurance. Patient v/u and is agreeable to plan.

## 2025-03-10 DIAGNOSIS — I81 PORTAL VEIN THROMBOSIS: ICD-10-CM

## 2025-03-11 ENCOUNTER — RESULTS FOLLOW-UP (OUTPATIENT)
Dept: INFUSION THERAPY | Age: 75
End: 2025-03-11

## 2025-03-11 ASSESSMENT — ENCOUNTER SYMPTOMS
DIARRHEA: 0
WHEEZING: 0
BLOOD IN STOOL: 0
CONSTIPATION: 0
COUGH: 0
EYES NEGATIVE: 1
ABDOMINAL PAIN: 0
SORE THROAT: 0
EYE DISCHARGE: 0
EYE REDNESS: 0
NAUSEA: 1
VOMITING: 0
RESPIRATORY NEGATIVE: 1
BACK PAIN: 0
EYE PAIN: 0
SHORTNESS OF BREATH: 0

## 2025-03-21 ENCOUNTER — HOSPITAL ENCOUNTER (OUTPATIENT)
Dept: INFUSION THERAPY | Age: 75
Discharge: HOME | End: 2025-03-21
Payer: MEDICARE

## 2025-03-21 VITALS
OXYGEN SATURATION: 95 % | TEMPERATURE: 97 F | BODY MASS INDEX: 31.76 KG/M2 | RESPIRATION RATE: 18 BRPM | HEART RATE: 85 BPM | WEIGHT: 185 LBS | SYSTOLIC BLOOD PRESSURE: 137 MMHG | DIASTOLIC BLOOD PRESSURE: 87 MMHG

## 2025-03-21 DIAGNOSIS — K90.9 IRON MALABSORPTION: Primary | ICD-10-CM

## 2025-03-21 DIAGNOSIS — I85.01 IDIOPATHIC ESOPHAGEAL VARICES WITH BLEEDING (HCC): ICD-10-CM

## 2025-03-21 DIAGNOSIS — D50.8 IRON DEFICIENCY ANEMIA SECONDARY TO INADEQUATE DIETARY IRON INTAKE: ICD-10-CM

## 2025-03-21 LAB
BASOPHILS # BLD: 0.03 K/UL (ref 0–0.2)
BASOPHILS NFR BLD: 0.8 % (ref 0–1)
EOSINOPHIL # BLD: 0.1 K/UL (ref 0–0.6)
EOSINOPHIL NFR BLD: 2.7 % (ref 0–5)
ERYTHROCYTE [DISTWIDTH] IN BLOOD BY AUTOMATED COUNT: 19.6 % (ref 11.5–14.5)
HCT VFR BLD AUTO: 31.3 % (ref 37–47)
HGB BLD-MCNC: 10 G/DL (ref 12–16)
LYMPHOCYTES # BLD: 1.04 K/UL (ref 1.1–4.5)
LYMPHOCYTES NFR BLD: 27.7 % (ref 20–40)
MCH RBC QN AUTO: 28.5 PG (ref 27–31)
MCHC RBC AUTO-ENTMCNC: 31.9 G/DL (ref 33–37)
MCV RBC AUTO: 89.2 FL (ref 81–99)
MONOCYTES # BLD: 0.4 K/UL (ref 0–0.9)
MONOCYTES NFR BLD: 10.7 % (ref 1–10)
NEUTROPHILS # BLD: 2.17 K/UL (ref 1.5–7.5)
NEUTS SEG NFR BLD: 57.8 % (ref 50–65)
PLATELET # BLD AUTO: 106 K/UL (ref 130–400)
PMV BLD AUTO: 10.8 FL (ref 9.4–12.3)
RBC # BLD AUTO: 3.51 M/UL (ref 4.2–5.4)
WBC # BLD AUTO: 3.75 K/UL (ref 4.8–10.8)

## 2025-03-21 PROCEDURE — 6360000002 HC RX W HCPCS: Performed by: NURSE PRACTITIONER

## 2025-03-21 PROCEDURE — 36415 COLL VENOUS BLD VENIPUNCTURE: CPT

## 2025-03-21 PROCEDURE — 2580000003 HC RX 258: Performed by: NURSE PRACTITIONER

## 2025-03-21 PROCEDURE — 96365 THER/PROPH/DIAG IV INF INIT: CPT

## 2025-03-21 PROCEDURE — 85025 COMPLETE CBC W/AUTO DIFF WBC: CPT

## 2025-03-21 RX ORDER — SODIUM CHLORIDE 0.9 % (FLUSH) 0.9 %
5-40 SYRINGE (ML) INJECTION PRN
OUTPATIENT
Start: 2025-03-28

## 2025-03-21 RX ORDER — ACETAMINOPHEN 325 MG/1
650 TABLET ORAL
OUTPATIENT
Start: 2025-03-28

## 2025-03-21 RX ORDER — SODIUM CHLORIDE 9 MG/ML
5-250 INJECTION, SOLUTION INTRAVENOUS PRN
OUTPATIENT
Start: 2025-03-28

## 2025-03-21 RX ORDER — ONDANSETRON 2 MG/ML
8 INJECTION INTRAMUSCULAR; INTRAVENOUS
OUTPATIENT
Start: 2025-03-28

## 2025-03-21 RX ORDER — DIPHENHYDRAMINE HYDROCHLORIDE 50 MG/ML
50 INJECTION, SOLUTION INTRAMUSCULAR; INTRAVENOUS
OUTPATIENT
Start: 2025-03-28

## 2025-03-21 RX ORDER — HEPARIN 100 UNIT/ML
500 SYRINGE INTRAVENOUS PRN
OUTPATIENT
Start: 2025-03-28

## 2025-03-21 RX ORDER — SODIUM CHLORIDE 9 MG/ML
INJECTION, SOLUTION INTRAVENOUS CONTINUOUS
OUTPATIENT
Start: 2025-03-28

## 2025-03-21 RX ORDER — ALBUTEROL SULFATE 90 UG/1
4 INHALANT RESPIRATORY (INHALATION) PRN
OUTPATIENT
Start: 2025-03-28

## 2025-03-21 RX ORDER — FAMOTIDINE 10 MG/ML
20 INJECTION, SOLUTION INTRAVENOUS
OUTPATIENT
Start: 2025-03-28

## 2025-03-21 RX ORDER — EPINEPHRINE 1 MG/ML
0.3 INJECTION, SOLUTION, CONCENTRATE INTRAVENOUS PRN
OUTPATIENT
Start: 2025-03-28

## 2025-03-21 RX ORDER — HYDROCORTISONE SODIUM SUCCINATE 100 MG/2ML
100 INJECTION INTRAMUSCULAR; INTRAVENOUS
OUTPATIENT
Start: 2025-03-28

## 2025-03-21 RX ADMIN — FERUMOXYTOL 510 MG: 510 INJECTION INTRAVENOUS at 14:55

## 2025-03-28 ENCOUNTER — RESULTS FOLLOW-UP (OUTPATIENT)
Dept: HEMATOLOGY | Age: 75
End: 2025-03-28

## 2025-04-07 ENCOUNTER — HOSPITAL ENCOUNTER (OUTPATIENT)
Dept: CT IMAGING | Age: 75
Discharge: HOME | End: 2025-04-07
Payer: MEDICARE

## 2025-04-07 ENCOUNTER — HOSPITAL ENCOUNTER (OUTPATIENT)
Dept: INFUSION THERAPY | Age: 75
Discharge: HOME | End: 2025-04-07
Payer: MEDICARE

## 2025-04-07 VITALS
RESPIRATION RATE: 18 BRPM | SYSTOLIC BLOOD PRESSURE: 120 MMHG | HEART RATE: 76 BPM | WEIGHT: 184 LBS | TEMPERATURE: 98 F | DIASTOLIC BLOOD PRESSURE: 63 MMHG | BODY MASS INDEX: 31.58 KG/M2 | OXYGEN SATURATION: 97 %

## 2025-04-07 DIAGNOSIS — D50.8 IRON DEFICIENCY ANEMIA SECONDARY TO INADEQUATE DIETARY IRON INTAKE: ICD-10-CM

## 2025-04-07 DIAGNOSIS — Z85.118 HISTORY OF LUNG CANCER: ICD-10-CM

## 2025-04-07 DIAGNOSIS — K90.9 IRON MALABSORPTION: Primary | ICD-10-CM

## 2025-04-07 DIAGNOSIS — I85.01 IDIOPATHIC ESOPHAGEAL VARICES WITH BLEEDING (HCC): ICD-10-CM

## 2025-04-07 LAB
BASOPHILS # BLD: 0.04 K/UL (ref 0–0.2)
BASOPHILS NFR BLD: 1.1 % (ref 0–1)
EOSINOPHIL # BLD: 0.11 K/UL (ref 0–0.6)
EOSINOPHIL NFR BLD: 2.9 % (ref 0–5)
ERYTHROCYTE [DISTWIDTH] IN BLOOD BY AUTOMATED COUNT: 19.9 % (ref 11.5–14.5)
HCT VFR BLD AUTO: 35.9 % (ref 37–47)
HGB BLD-MCNC: 11.6 G/DL (ref 12–16)
LYMPHOCYTES # BLD: 1.05 K/UL (ref 1.1–4.5)
LYMPHOCYTES NFR BLD: 27.7 % (ref 20–40)
MCH RBC QN AUTO: 29.9 PG (ref 27–31)
MCHC RBC AUTO-ENTMCNC: 32.3 G/DL (ref 33–37)
MCV RBC AUTO: 92.5 FL (ref 81–99)
MONOCYTES # BLD: 0.34 K/UL (ref 0–0.9)
MONOCYTES NFR BLD: 9 % (ref 1–10)
NEUTROPHILS # BLD: 2.24 K/UL (ref 1.5–7.5)
NEUTS SEG NFR BLD: 59 % (ref 50–65)
PLATELET # BLD AUTO: 96 K/UL (ref 130–400)
PMV BLD AUTO: 10.5 FL (ref 9.4–12.3)
RBC # BLD AUTO: 3.88 M/UL (ref 4.2–5.4)
WBC # BLD AUTO: 3.79 K/UL (ref 4.8–10.8)

## 2025-04-07 PROCEDURE — 85025 COMPLETE CBC W/AUTO DIFF WBC: CPT

## 2025-04-07 PROCEDURE — 6360000002 HC RX W HCPCS: Performed by: NURSE PRACTITIONER

## 2025-04-07 PROCEDURE — 71250 CT THORAX DX C-: CPT

## 2025-04-07 PROCEDURE — 2580000003 HC RX 258: Performed by: NURSE PRACTITIONER

## 2025-04-07 PROCEDURE — 96365 THER/PROPH/DIAG IV INF INIT: CPT

## 2025-04-07 PROCEDURE — 36415 COLL VENOUS BLD VENIPUNCTURE: CPT

## 2025-04-07 RX ORDER — ONDANSETRON 2 MG/ML
8 INJECTION INTRAMUSCULAR; INTRAVENOUS
OUTPATIENT
Start: 2025-04-11

## 2025-04-07 RX ORDER — SODIUM CHLORIDE 9 MG/ML
5-250 INJECTION, SOLUTION INTRAVENOUS PRN
OUTPATIENT
Start: 2025-04-11

## 2025-04-07 RX ORDER — SODIUM CHLORIDE 0.9 % (FLUSH) 0.9 %
5-40 SYRINGE (ML) INJECTION PRN
OUTPATIENT
Start: 2025-04-11

## 2025-04-07 RX ORDER — HEPARIN 100 UNIT/ML
500 SYRINGE INTRAVENOUS PRN
OUTPATIENT
Start: 2025-04-11

## 2025-04-07 RX ORDER — EPINEPHRINE 1 MG/ML
0.3 INJECTION, SOLUTION, CONCENTRATE INTRAVENOUS PRN
OUTPATIENT
Start: 2025-04-11

## 2025-04-07 RX ORDER — HYDROCORTISONE SODIUM SUCCINATE 100 MG/2ML
100 INJECTION INTRAMUSCULAR; INTRAVENOUS
OUTPATIENT
Start: 2025-04-11

## 2025-04-07 RX ORDER — DIPHENHYDRAMINE HYDROCHLORIDE 50 MG/ML
50 INJECTION, SOLUTION INTRAMUSCULAR; INTRAVENOUS
OUTPATIENT
Start: 2025-04-11

## 2025-04-07 RX ORDER — SODIUM CHLORIDE 9 MG/ML
INJECTION, SOLUTION INTRAVENOUS CONTINUOUS
OUTPATIENT
Start: 2025-04-11

## 2025-04-07 RX ORDER — FAMOTIDINE 10 MG/ML
20 INJECTION, SOLUTION INTRAVENOUS
OUTPATIENT
Start: 2025-04-11

## 2025-04-07 RX ORDER — ACETAMINOPHEN 325 MG/1
650 TABLET ORAL
OUTPATIENT
Start: 2025-04-11

## 2025-04-07 RX ORDER — ALBUTEROL SULFATE 90 UG/1
4 INHALANT RESPIRATORY (INHALATION) PRN
OUTPATIENT
Start: 2025-04-11

## 2025-04-07 RX ADMIN — FERUMOXYTOL 510 MG: 510 INJECTION INTRAVENOUS at 14:20

## 2025-04-08 ENCOUNTER — RESULTS FOLLOW-UP (OUTPATIENT)
Dept: HEMATOLOGY | Age: 75
End: 2025-04-08

## 2025-04-14 ENCOUNTER — RESULTS FOLLOW-UP (OUTPATIENT)
Dept: HEMATOLOGY | Age: 75
End: 2025-04-14

## 2025-04-30 DIAGNOSIS — D50.8 IRON DEFICIENCY ANEMIA SECONDARY TO INADEQUATE DIETARY IRON INTAKE: Primary | ICD-10-CM

## 2025-06-05 ENCOUNTER — TELEPHONE (OUTPATIENT)
Dept: HEMATOLOGY | Age: 75
End: 2025-06-05

## 2025-06-05 NOTE — TELEPHONE ENCOUNTER
Called pt to change 6/25 appointment with Fiona Osuna to Monday 7/28/25 at 1:15 pm.  Pt spoke with her son/ride and said the new day/time will work for her.  New appt info was written into her calendar.

## 2025-06-14 ENCOUNTER — TELEPHONE (OUTPATIENT)
Dept: HEMATOLOGY | Age: 75
End: 2025-06-14

## 2025-06-14 NOTE — TELEPHONE ENCOUNTER
Received a teams msg stating Ivelisse needed to speak to a nurse regarding pain after Iron infusion. I attempted to call Ivelisse and got VM. I left Msg and instructed her to call after hours if she felt it needed to be addressed prior to Monday.

## 2025-06-17 ENCOUNTER — OFFICE VISIT (OUTPATIENT)
Dept: GASTROENTEROLOGY | Facility: CLINIC | Age: 75
End: 2025-06-17
Payer: MEDICARE

## 2025-06-17 VITALS
WEIGHT: 185 LBS | TEMPERATURE: 97 F | SYSTOLIC BLOOD PRESSURE: 126 MMHG | HEIGHT: 64 IN | OXYGEN SATURATION: 99 % | DIASTOLIC BLOOD PRESSURE: 76 MMHG | HEART RATE: 70 BPM | BODY MASS INDEX: 31.58 KG/M2

## 2025-06-17 DIAGNOSIS — K74.60 CIRRHOSIS OF LIVER WITHOUT ASCITES, UNSPECIFIED HEPATIC CIRRHOSIS TYPE: Primary | ICD-10-CM

## 2025-06-17 PROCEDURE — 99213 OFFICE O/P EST LOW 20 MIN: CPT | Performed by: NURSE PRACTITIONER

## 2025-06-17 RX ORDER — PANTOPRAZOLE SODIUM 40 MG/1
40 TABLET, DELAYED RELEASE ORAL 2 TIMES DAILY
COMMUNITY
Start: 2025-05-13

## 2025-06-17 RX ORDER — LOSARTAN POTASSIUM 25 MG/1
25 TABLET ORAL DAILY
COMMUNITY
Start: 2025-05-13 | End: 2025-08-11

## 2025-06-17 NOTE — PROGRESS NOTES
"  Chief Complaint   Patient presents with    Endoscopy     11-28-24 endo varices by dr. Oconnell had endo in [place] has been seeing doctors in [place] wants to start going here had endo 1-2025       PCP: Dash Melendez MD  REFER: Shannan Devi, AP*    Subjective     HPI    Reina Palomares presents with diagnosis of cirrhosis contributed to hcv (s/p treatment with Harvoni, failed treatment with interferon/RBV)  Treated as inpatient at [facility] Nov 2024 for rectal bleeding.  Found to have grade 2 varices on EGD.      Hgb - 12  Plt 87    EGD 1/21/25-[place]   Upper GI Endoscopy (11/28/2024 09:12)   EGD 1/23/2024 with grade 2 EV and PHG AT [facility]       Colonoscopy 4/25/25-[facility]     AFP - No results found for: \"AFP\"        Lab Results - Last 18 Months   Lab Units 03/06/25  1312 01/23/25  1348 12/02/24  0615 11/30/24  0618 11/29/24  0222 11/28/24  0511 11/27/24  1205 10/21/24  1210 09/06/24  0713 04/10/24  1134 04/10/24  1114   CREATININE mg/dL 0.6 0.54* 0.56* 0.60 0.66 0.65 0.67  --   --    < > 0.57   BILIRUBIN mg/dL 0.5 1.1  --  0.5 0.6 0.8 0.7  --   --    < > 0.9   INR   --  1.58*  --   --   --   --  1.25* 1.5 1.14*  --  1.32*   SODIUM mmol/L 139 139 134* 139 135* 137 137  --   --    < > 141   SODIUM, ARTERIAL   --   --   --   --   --   --   --   --   --    < >  --     < > = values in this interval not displayed.       Lab Results - Last 18 Months   Lab Units 03/06/25  1312 01/23/25  1348 11/30/24  0618 11/29/24  0222 11/28/24  0511 11/27/24  1205   ALT (SGPT) U/L 21 26 47* 58* 42* 43*   AST (SGOT) U/L 40* 42* 62* 112* 64* 67*   ALBUMIN g/dL 4 4.1 4.1 4.0 4.1 4.2       Lab Results - Last 18 Months   Lab Units 03/06/25  1312 01/23/25  1348 12/02/24  0615 11/30/24  1604 11/30/24  0618 11/29/24  1224 11/29/24  0222 11/28/24  0511 11/27/24  1205   BUN mg/dL 9 24* 19  --  19  --  14 14 10   CREATININE mg/dL 0.6 0.54* 0.56*  --  0.60  --  0.66 0.65 0.67   BUN / CREAT RATIO   --  44.4* " 33.9*  --  31.7*  --  21.2 21.5 14.9   POTASSIUM mmol/L 4 3.5 3.4* 4.1 3.5 4.1 3.5 3.8 3.8       Lab Results - Last 18 Months   Lab Units 04/07/25  1354 03/21/25  1454 03/06/25  1312 01/23/25  1348 12/02/24  0615 11/30/24  0618   HEMOGLOBIN g/dL 11.6* 10* 9.5* 9.4* 11.4* 10.8*   HEMATOCRIT % 35.9* 31.3* 30.9* 30.2* 34.6 34.2   MCV fL 92.5 89.2 88.8 87.3 88.9 91.0   WBC K/uL 3.79* 3.75* 4.43* 6.34 11.05* 8.11   PLATELETS K/uL 96* 106* 110* 123* 125* 110*             Past Medical History:   Diagnosis Date    Anxiety     Arthritis     Blood clot in abdominal vein     Cancer     LUNG    Cirrhosis     states had blood clot neer liver and was told she had cirrhosis    GERD (gastroesophageal reflux disease)     Hyperthyroidism     Infectious viral hepatitis     H/O HEPATITIS C    Swelling of lower extremity     Varices, esophageal        Past Surgical History:   Procedure Laterality Date    CARDIAC CATHETERIZATION      CHOLECYSTECTOMY  2020    [place]    CLAVICLE SURGERY Right     COLONOSCOPY      ENDOSCOPY N/A 11/28/2024    Procedure: ESOPHAGOGASTRODUODENOSCOPY WITH ANESTHESIA;  Surgeon: Chandrika Oconnell MD;  Location: [facility] OR;  Service: Gastroenterology;  Laterality: N/A;  pre GI bleeding  post esophageal varices banding;   pcp Dash Melendez MD    HYSTERECTOMY      TOTAL ABDOMINAL HYSTERECTOMY    KNEE ARTHROSCOPY Bilateral     LUNG REMOVAL, TOTAL Left     REPLACEMENT TOTAL KNEE Left     THYROID BIOPSY      BENIGN    TUBAL ABDOMINAL LIGATION      X2    VARICOSE VEIN SURGERY Left 9/6/2024    Procedure: LEFT LOWER EXTREMITY RADIO FREQUENCY ABLATION;  Surgeon: Elan Mathews DO;  Location:  [facility] OR;  Service: Vascular;  Laterality: Left;       Outpatient Medications Marked as Taking for the 6/17/25 encounter (Office Visit) with Federico Cox APRN   Medication Sig Dispense Refill    apixaban (ELIQUIS) 2.5 MG tablet tablet Take 1 tablet by mouth Every 12 (Twelve) Hours.      levothyroxine (SYNTHROID,  LEVOTHROID) 112 MCG tablet Take 1 tablet by mouth Daily.      loratadine (CLARITIN) 10 MG tablet Take 1 tablet by mouth Daily.      losartan (COZAAR) 25 MG tablet Take 1 tablet by mouth Daily.      oxybutynin XL (DITROPAN-XL) 10 MG 24 hr tablet Take 1 tablet by mouth Daily.      pantoprazole (PROTONIX) 40 MG EC tablet Take 1 tablet by mouth 2 (Two) Times a Day. as directed      tiotropium bromide-olodaterol (Stiolto Respimat) 2.5-2.5 MCG/ACT aerosol solution inhaler Inhale 2 puffs Daily As Needed (shortness of air).      TiZANidine (ZANAFLEX) 2 MG capsule Take 1 capsule by mouth Every 12 (Twelve) Hours As Needed for Muscle Spasms.         Allergies   Allergen Reactions    Dilaudid [Hydromorphone Hcl] Hallucinations       Social History     Socioeconomic History    Marital status:    Tobacco Use    Smoking status: Former     Current packs/day: 0.00     Types: Cigarettes     Quit date:      Years since quittin.5     Passive exposure: Past    Smokeless tobacco: Never    Tobacco comments:     LESS THAN 1 PPD FOR 12 YEARS   Vaping Use    Vaping status: Never Used   Substance and Sexual Activity    Alcohol use: Yes     Comment: OCCASIONAL WINE    Drug use: No    Sexual activity: Defer     Partners: Male     Birth control/protection: None       Family History   Problem Relation Age of Onset    Other Mother     Other Father     Melanoma Sister         LEFT ARM    Hypothyroidism Sister     Hyperthyroidism Sister     Cancer Brother     Coronary artery disease Brother     Cancer Other     Coronary artery disease Other     Thyroid disease Other     Melanoma Other     Breast cancer Neg Hx     Colon cancer Neg Hx     Colon polyps Neg Hx     Esophageal cancer Neg Hx        Review of Systems   Constitutional:  Negative for fever and unexpected weight change.   HENT:  Negative for trouble swallowing.    Respiratory:  Negative for shortness of breath.    Cardiovascular:  Negative for chest pain.   Gastrointestinal:   "Negative for abdominal pain and anal bleeding.       Objective     Vitals:    06/17/25 1310   BP: 126/76   Pulse: 70   Temp: 97 °F (36.1 °C)   SpO2: 99%   Weight: 83.9 kg (185 lb)   Height: 162.6 cm (64\")     Body mass index is 31.76 kg/m².    Physical Exam  Constitutional:       Appearance: Normal appearance. She is well-developed.   Eyes:      General: No scleral icterus.  Cardiovascular:      Heart sounds: Normal heart sounds. No murmur heard.  Pulmonary:      Effort: Pulmonary effort is normal.   Abdominal:      General: Bowel sounds are normal. There is no distension.      Palpations: Abdomen is soft.      Tenderness: There is no abdominal tenderness. There is no guarding.   Skin:     General: Skin is warm and dry.      Coloration: Skin is not jaundiced.   Neurological:      Mental Status: She is alert.   Psychiatric:         Behavior: Behavior is cooperative.         Imaging Results (Most Recent)       None            Body mass index is 31.76 kg/m².    Assessment & Plan     Diagnoses and all orders for this visit:    1. Cirrhosis of liver without ascites, unspecified hepatic cirrhosis type (Primary)        * Surgery not found *        Reina Palomares has previously taken propranolol for variceal bleeding prophylaxis, but discontinued this medication due to breathing concerns. Her symptoms improved since stopping NSBB so medication was not resumed.   Recommend EGD for surveillance of varices  I discussed this being at [place] vs [facility].  I encourage her to continue and follow up with [place], I also explained we are happy to continue and help her as well    ADDENDUM - after office visit I spoke with Reina Palomares pharmacy, last fill of propanolol was 6/25/24    Explanation provided that all patients with liver disease should avoid narcotics, sedatives, due to potential to aggravate encephalopathy. Patient should also avoid ASA, NSAIDS or other medications that contain these products due to " "their potential to decrease plt adhesiveness, irritate the stomach, or reduce renal function.  Tylenol explained to be acceptable as long as use is limited to 2000 mg per day.    Patients with cirrhosis have an increased risk of development of hepatocellular cancer.  They will need to undergo yearly AFP and imaging. Patient will also be advised to undergo EGD evaluation every 1-2 years for variceal screening.      Diet for patient with cirrhosis explained as increase in protein to prevent muscle wasting. They should also limit Na to 2413-1578 mg per day.  Exercising will also prevent muscle wasting and improve muscle growth.  I have encouraged recording daily weights and contacting office with greater than 10 lb weight gain in one week.        Federico Cox, APRN  07/20/25                Low-Sodium Eating Plan  Sodium, which is an element that makes up salt, helps you maintain a healthy balance of fluids in your body. Too much sodium can increase your blood pressure and cause fluid and waste to be held in your body.  Your health care provider or dietitian may recommend following this plan if you have high blood pressure (hypertension), kidney disease, liver disease, or heart failure. Eating less sodium can help lower your blood pressure, reduce swelling, and protect your heart, liver, and kidneys.  What are tips for following this plan?  Reading food labels  The Nutrition Facts label lists the amount of sodium in one serving of the food. If you eat more than one serving, you must multiply the listed amount of sodium by the number of servings.  Choose foods with less than 140 mg of sodium per serving.  Avoid foods with 300 mg of sodium or more per serving.  Shopping    Look for lower-sodium products, often labeled as \"low-sodium\" or \"no salt added.\"  Always check the sodium content, even if foods are labeled as \"unsalted\" or \"no salt added.\"  Buy fresh foods.  Avoid canned foods and pre-made or frozen " "meals.  Avoid canned, cured, or processed meats.  Buy breads that have less than 80 mg of sodium per slice.  Cooking    Eat more home-cooked food and less restaurant, buffet, and fast food.  Avoid adding salt when cooking. Use salt-free seasonings or herbs instead of table salt or sea salt. Check with your health care provider or pharmacist before using salt substitutes.  Cook with plant-based oils, such as canola, sunflower, or olive oil.  Meal planning  When eating at a restaurant, ask that your food be prepared with less salt or no salt, if possible. Avoid dishes labeled as brined, pickled, cured, smoked, or made with soy sauce, miso, or teriyaki sauce.  Avoid foods that contain MSG (monosodium glutamate). MSG is sometimes added to Chinese food, bouillon, and some canned foods.  Make meals that can be grilled, baked, poached, roasted, or steamed. These are generally made with less sodium.  General information  Most people on this plan should limit their sodium intake to 1,500-2,000 mg (milligrams) of sodium each day.  What foods should I eat?  Fruits  Fresh, frozen, or canned fruit. Fruit juice.  Vegetables  Fresh or frozen vegetables. \"No salt added\" canned vegetables. \"No salt added\" tomato sauce and paste. Low-sodium or reduced-sodium tomato and vegetable juice.  Grains  Low-sodium cereals, including oats, puffed wheat and rice, and shredded wheat. Low-sodium crackers. Unsalted rice. Unsalted pasta. Low-sodium bread. Whole-grain breads and whole-grain pasta.  Meats and other proteins  Fresh or frozen (no salt added) meat, poultry, seafood, and fish. Low-sodium canned tuna and salmon. Unsalted nuts. Dried peas, beans, and lentils without added salt. Unsalted canned beans. Eggs. Unsalted nut butters.  Dairy  Milk. Soy milk. Cheese that is naturally low in sodium, such as ricotta cheese, fresh mozzarella, or Swiss cheese. Low-sodium or reduced-sodium cheese. Cream cheese. Yogurt.  Seasonings and condiments  Fresh " and dried herbs and spices. Salt-free seasonings. Low-sodium mustard and ketchup. Sodium-free salad dressing. Sodium-free light mayonnaise. Fresh or refrigerated horseradish. Lemon juice. Vinegar.  Other foods  Homemade, reduced-sodium, or low-sodium soups. Unsalted popcorn and pretzels. Low-salt or salt-free chips.  The items listed above may not be a complete list of foods and beverages you can eat. Contact a dietitian for more information.  What foods should I avoid?  Vegetables  Sauerkraut, pickled vegetables, and relishes. Olives. French fries. Onion rings. Regular canned vegetables (not low-sodium or reduced-sodium). Regular canned tomato sauce and paste (not low-sodium or reduced-sodium). Regular tomato and vegetable juice (not low-sodium or reduced-sodium). Frozen vegetables in sauces.  Grains  Instant hot cereals. Bread stuffing, pancake, and biscuit mixes. Croutons. Seasoned rice or pasta mixes. Noodle soup cups. Boxed or frozen macaroni and cheese. Regular salted crackers. Self-rising flour.  Meats and other proteins  Meat or fish that is salted, canned, smoked, spiced, or pickled. Precooked or cured meat, such as sausages or meat loaves. Mckeon. Ham. Pepperoni. Hot dogs. Corned beef. Chipped beef. Salt pork. Jerky. Pickled herring. Anchovies and sardines. Regular canned tuna. Salted nuts.  Dairy  Processed cheese and cheese spreads. Hard cheeses. Cheese curds. Blue cheese. Feta cheese. String cheese. Regular cottage cheese. Buttermilk. Canned milk.  Fats and oils  Salted butter. Regular margarine. Ghee. Mckeon fat.  Seasonings and condiments  Onion salt, garlic salt, seasoned salt, table salt, and sea salt. Canned and packaged gravies. Worcestershire sauce. Tartar sauce. Barbecue sauce. Teriyaki sauce. Soy sauce, including reduced-sodium. Steak sauce. Fish sauce. Oyster sauce. Cocktail sauce. Horseradish that you find on the shelf. Regular ketchup and mustard. Meat flavorings and tenderizers. Jermainon  cubes. Hot sauce. Pre-made or packaged marinades. Pre-made or packaged taco seasonings. Relishes. Regular salad dressings. Salsa.  Other foods  Salted popcorn and pretzels. Corn chips and puffs. Potato and tortilla chips. Canned or dried soups. Pizza. Frozen entrees and pot pies.  The items listed above may not be a complete list of foods and beverages you should avoid. Contact a dietitian for more information.  Summary  Eating less sodium can help lower your blood pressure, reduce swelling, and protect your heart, liver, and kidneys.  Most people on this plan should limit their sodium intake to 1,500-2,000 mg (milligrams) of sodium each day.  Canned, boxed, and frozen foods are high in sodium. Restaurant foods, fast foods, and pizza are also very high in sodium. You also get sodium by adding salt to food.  Try to cook at home, eat more fresh fruits and vegetables, and eat less fast food and canned, processed, or prepared foods.  This information is not intended to replace advice given to you by your health care provider. Make sure you discuss any questions you have with your health care provider.  Document Revised: 01/22/2021 Document Reviewed: 11/18/2020  Elsevier Patient Education © 2021 [employer]  There are no Patient Instructions on file for this visit.

## 2025-06-20 ENCOUNTER — PATIENT ROUNDING (BHMG ONLY) (OUTPATIENT)
Dept: GASTROENTEROLOGY | Facility: CLINIC | Age: 75
End: 2025-06-20
Payer: MEDICARE

## 2025-07-03 ENCOUNTER — TELEPHONE (OUTPATIENT)
Dept: GASTROENTEROLOGY | Facility: CLINIC | Age: 75
End: 2025-07-03
Payer: MEDICARE

## 2025-07-09 ENCOUNTER — TELEPHONE (OUTPATIENT)
Dept: GASTROENTEROLOGY | Facility: CLINIC | Age: 75
End: 2025-07-09

## 2025-07-10 ENCOUNTER — TELEPHONE (OUTPATIENT)
Dept: VASCULAR SURGERY | Facility: CLINIC | Age: 75
End: 2025-07-10

## 2025-07-14 ENCOUNTER — TELEPHONE (OUTPATIENT)
Dept: VASCULAR SURGERY | Facility: CLINIC | Age: 75
End: 2025-07-14
Payer: MEDICARE

## 2025-07-14 NOTE — TELEPHONE ENCOUNTER
Call placed and no answer and voicemail left reminding patient of appointment on 7/15/2025 at 1100 and requested call back

## 2025-07-18 DIAGNOSIS — K74.60 CIRRHOSIS OF LIVER WITHOUT ASCITES, UNSPECIFIED HEPATIC CIRRHOSIS TYPE: Primary | ICD-10-CM

## 2025-07-29 ENCOUNTER — TRANSCRIBE ORDERS (OUTPATIENT)
Dept: ADMINISTRATIVE | Facility: HOSPITAL | Age: 75
End: 2025-07-29
Payer: MEDICARE

## 2025-07-29 DIAGNOSIS — K74.69 OTHER CIRRHOSIS OF LIVER: Primary | ICD-10-CM

## 2025-08-01 ENCOUNTER — LAB (OUTPATIENT)
Dept: LAB | Facility: HOSPITAL | Age: 75
End: 2025-08-01
Payer: MEDICARE

## 2025-08-01 ENCOUNTER — HOSPITAL ENCOUNTER (OUTPATIENT)
Dept: CT IMAGING | Facility: HOSPITAL | Age: 75
Discharge: HOME | End: 2025-08-01
Payer: MEDICARE

## 2025-08-01 DIAGNOSIS — K74.69 OTHER CIRRHOSIS OF LIVER: ICD-10-CM

## 2025-08-01 DIAGNOSIS — K74.60 CIRRHOSIS OF LIVER WITHOUT ASCITES, UNSPECIFIED HEPATIC CIRRHOSIS TYPE: ICD-10-CM

## 2025-08-01 LAB
25(OH)D3 SERPL-MCNC: 27.8 NG/ML (ref 30–100)
ALBUMIN SERPL-MCNC: 4.3 G/DL (ref 3.5–5.2)
ALBUMIN/GLOB SERPL: 1.2 G/DL
ALP SERPL-CCNC: 89 U/L (ref 39–117)
ALPHA-FETOPROTEIN: 6.34 NG/ML (ref 0–8.3)
ALT SERPL W P-5'-P-CCNC: 36 U/L (ref 1–33)
ANION GAP SERPL CALCULATED.3IONS-SCNC: 10 MMOL/L (ref 5–15)
AST SERPL-CCNC: 54 U/L (ref 1–32)
BILIRUB SERPL-MCNC: 0.7 MG/DL (ref 0–1.2)
BUN SERPL-MCNC: 12.6 MG/DL (ref 8–23)
BUN/CREAT SERPL: 20.7 (ref 7–25)
CALCIUM SPEC-SCNC: 8.9 MG/DL (ref 8.6–10.5)
CHLORIDE SERPL-SCNC: 103 MMOL/L (ref 98–107)
CO2 SERPL-SCNC: 24 MMOL/L (ref 22–29)
CREAT SERPL-MCNC: 0.61 MG/DL (ref 0.57–1)
DEPRECATED RDW RBC AUTO: 47.9 FL (ref 37–54)
EGFRCR SERPLBLD CKD-EPI 2021: 93.9 ML/MIN/1.73
ERYTHROCYTE [DISTWIDTH] IN BLOOD BY AUTOMATED COUNT: 13.3 % (ref 12.3–15.4)
GLOBULIN UR ELPH-MCNC: 3.6 GM/DL
GLUCOSE SERPL-MCNC: 131 MG/DL (ref 65–99)
HCT VFR BLD AUTO: 36.1 % (ref 34–46.6)
HGB BLD-MCNC: 11.8 G/DL (ref 12–15.9)
INR PPP: 1.33 (ref 0.91–1.09)
MCH RBC QN AUTO: 31.7 PG (ref 26.6–33)
MCHC RBC AUTO-ENTMCNC: 32.7 G/DL (ref 31.5–35.7)
MCV RBC AUTO: 97 FL (ref 79–97)
PLATELET # BLD AUTO: 87 10*3/MM3 (ref 140–450)
PMV BLD AUTO: 10.5 FL (ref 6–12)
POTASSIUM SERPL-SCNC: 3.5 MMOL/L (ref 3.5–5.2)
PROT SERPL-MCNC: 7.9 G/DL (ref 6–8.5)
PROTHROMBIN TIME: 17.2 SECONDS (ref 11.8–14.8)
RBC # BLD AUTO: 3.72 10*6/MM3 (ref 3.77–5.28)
SODIUM SERPL-SCNC: 137 MMOL/L (ref 136–145)
WBC NRBC COR # BLD AUTO: 4.1 10*3/MM3 (ref 3.4–10.8)

## 2025-08-01 PROCEDURE — 80053 COMPREHEN METABOLIC PANEL: CPT

## 2025-08-01 PROCEDURE — 85610 PROTHROMBIN TIME: CPT

## 2025-08-01 PROCEDURE — 85027 COMPLETE CBC AUTOMATED: CPT

## 2025-08-01 PROCEDURE — 74160 CT ABDOMEN W/CONTRAST: CPT

## 2025-08-01 PROCEDURE — 82306 VITAMIN D 25 HYDROXY: CPT

## 2025-08-01 PROCEDURE — 82105 ALPHA-FETOPROTEIN SERUM: CPT

## 2025-08-01 PROCEDURE — 36415 COLL VENOUS BLD VENIPUNCTURE: CPT

## 2025-08-01 PROCEDURE — 25510000001 IOPAMIDOL 61 % SOLUTION: Performed by: NURSE PRACTITIONER

## 2025-08-01 RX ORDER — IOPAMIDOL 612 MG/ML
100 INJECTION, SOLUTION INTRAVASCULAR
Status: COMPLETED | OUTPATIENT
Start: 2025-08-01 | End: 2025-08-01

## 2025-08-01 RX ADMIN — IOPAMIDOL 100 ML: 612 INJECTION, SOLUTION INTRAVENOUS at 14:37

## 2025-08-08 ENCOUNTER — TELEPHONE (OUTPATIENT)
Dept: HEMATOLOGY | Age: 75
End: 2025-08-08

## 2025-08-12 DIAGNOSIS — R16.1 SPLENOMEGALY: ICD-10-CM

## 2025-08-12 DIAGNOSIS — K74.60 CIRRHOSIS OF LIVER WITHOUT ASCITES, UNSPECIFIED HEPATIC CIRRHOSIS TYPE (HCC): ICD-10-CM

## 2025-08-12 DIAGNOSIS — D50.8 IRON DEFICIENCY ANEMIA SECONDARY TO INADEQUATE DIETARY IRON INTAKE: Primary | ICD-10-CM

## 2025-08-13 ENCOUNTER — OFFICE VISIT (OUTPATIENT)
Dept: HEMATOLOGY | Age: 75
End: 2025-08-13
Payer: MEDICARE

## 2025-08-13 ENCOUNTER — HOSPITAL ENCOUNTER (OUTPATIENT)
Dept: INFUSION THERAPY | Age: 75
Discharge: HOME | End: 2025-08-13
Payer: MEDICARE

## 2025-08-13 VITALS
OXYGEN SATURATION: 97 % | TEMPERATURE: 97.2 F | BODY MASS INDEX: 32.9 KG/M2 | HEART RATE: 76 BPM | DIASTOLIC BLOOD PRESSURE: 80 MMHG | WEIGHT: 192.7 LBS | SYSTOLIC BLOOD PRESSURE: 144 MMHG | HEIGHT: 64 IN

## 2025-08-13 DIAGNOSIS — R16.1 SPLENOMEGALY: ICD-10-CM

## 2025-08-13 DIAGNOSIS — K74.60 CIRRHOSIS OF LIVER WITHOUT ASCITES, UNSPECIFIED HEPATIC CIRRHOSIS TYPE (HCC): ICD-10-CM

## 2025-08-13 DIAGNOSIS — Z12.31 ENCOUNTER FOR SCREENING MAMMOGRAM FOR MALIGNANT NEOPLASM OF BREAST: ICD-10-CM

## 2025-08-13 DIAGNOSIS — D50.8 IRON DEFICIENCY ANEMIA SECONDARY TO INADEQUATE DIETARY IRON INTAKE: ICD-10-CM

## 2025-08-13 DIAGNOSIS — R59.0 ENLARGED LYMPH NODE IN NECK: Primary | ICD-10-CM

## 2025-08-13 DIAGNOSIS — I85.01 IDIOPATHIC ESOPHAGEAL VARICES WITH BLEEDING (HCC): ICD-10-CM

## 2025-08-13 DIAGNOSIS — I81 PORTAL VEIN THROMBOSIS: ICD-10-CM

## 2025-08-13 LAB
ALBUMIN SERPL-MCNC: 4.2 G/DL (ref 3.5–5.2)
ALP SERPL-CCNC: 92 U/L (ref 35–104)
ALT SERPL-CCNC: 42 U/L (ref 5–33)
ANION GAP SERPL CALCULATED.3IONS-SCNC: 11 MMOL/L (ref 7–19)
AST SERPL-CCNC: 61 U/L (ref 5–32)
BASOPHILS # BLD: 0.05 K/UL (ref 0–0.2)
BASOPHILS NFR BLD: 1.1 % (ref 0–1)
BILIRUB SERPL-MCNC: 0.7 MG/DL (ref 0–1.2)
BUN SERPL-MCNC: 12 MG/DL (ref 8–23)
CALCIUM SERPL-MCNC: 9.7 MG/DL (ref 8.8–10.2)
CHLORIDE SERPL-SCNC: 104 MMOL/L (ref 98–107)
CO2 SERPL-SCNC: 26 MMOL/L (ref 22–29)
CREAT SERPL-MCNC: 0.6 MG/DL (ref 0.5–0.9)
EOSINOPHIL # BLD: 0.27 K/UL (ref 0–0.6)
EOSINOPHIL NFR BLD: 5.9 % (ref 0–5)
ERYTHROCYTE [DISTWIDTH] IN BLOOD BY AUTOMATED COUNT: 13.2 % (ref 11.5–14.5)
FERRITIN SERPL-MCNC: 30.4 NG/ML (ref 13–150)
GLUCOSE SERPL-MCNC: 124 MG/DL (ref 70–99)
HCT VFR BLD AUTO: 34.2 % (ref 37–47)
HGB BLD-MCNC: 11.5 G/DL (ref 12–16)
IRON SATN MFR SERPL: 16 % (ref 15–50)
IRON SERPL-MCNC: 70 UG/DL (ref 37–145)
LYMPHOCYTES # BLD: 1.05 K/UL (ref 1.1–4.5)
LYMPHOCYTES NFR BLD: 23 % (ref 20–40)
MCH RBC QN AUTO: 32 PG (ref 27–31)
MCHC RBC AUTO-ENTMCNC: 33.6 G/DL (ref 33–37)
MCV RBC AUTO: 95.3 FL (ref 81–99)
MONOCYTES # BLD: 0.41 K/UL (ref 0–0.9)
MONOCYTES NFR BLD: 9 % (ref 1–10)
NEUTROPHILS # BLD: 2.78 K/UL (ref 1.5–7.5)
NEUTS SEG NFR BLD: 60.8 % (ref 50–65)
PLATELET # BLD AUTO: 92 K/UL (ref 130–400)
PMV BLD AUTO: 9.7 FL (ref 9.4–12.3)
POTASSIUM SERPL-SCNC: 3.9 MMOL/L (ref 3.5–5.1)
PROT SERPL-MCNC: 7.9 G/DL (ref 6.4–8.3)
RBC # BLD AUTO: 3.59 M/UL (ref 4.2–5.4)
SODIUM SERPL-SCNC: 141 MMOL/L (ref 136–145)
TIBC SERPL-MCNC: 446 UG/DL (ref 250–400)
WBC # BLD AUTO: 4.57 K/UL (ref 4.8–10.8)

## 2025-08-13 PROCEDURE — 99214 OFFICE O/P EST MOD 30 MIN: CPT | Performed by: NURSE PRACTITIONER

## 2025-08-13 PROCEDURE — 1123F ACP DISCUSS/DSCN MKR DOCD: CPT | Performed by: NURSE PRACTITIONER

## 2025-08-13 PROCEDURE — 3017F COLORECTAL CA SCREEN DOC REV: CPT | Performed by: NURSE PRACTITIONER

## 2025-08-13 PROCEDURE — 1036F TOBACCO NON-USER: CPT | Performed by: NURSE PRACTITIONER

## 2025-08-13 PROCEDURE — 99213 OFFICE O/P EST LOW 20 MIN: CPT

## 2025-08-13 PROCEDURE — 36415 COLL VENOUS BLD VENIPUNCTURE: CPT | Performed by: NURSE PRACTITIONER

## 2025-08-13 PROCEDURE — G8417 CALC BMI ABV UP PARAM F/U: HCPCS | Performed by: NURSE PRACTITIONER

## 2025-08-13 PROCEDURE — 1126F AMNT PAIN NOTED NONE PRSNT: CPT | Performed by: NURSE PRACTITIONER

## 2025-08-13 PROCEDURE — G8427 DOCREV CUR MEDS BY ELIG CLIN: HCPCS | Performed by: NURSE PRACTITIONER

## 2025-08-13 PROCEDURE — 1159F MED LIST DOCD IN RCRD: CPT | Performed by: NURSE PRACTITIONER

## 2025-08-13 PROCEDURE — 1090F PRES/ABSN URINE INCON ASSESS: CPT | Performed by: NURSE PRACTITIONER

## 2025-08-13 PROCEDURE — G8400 PT W/DXA NO RESULTS DOC: HCPCS | Performed by: NURSE PRACTITIONER

## 2025-08-13 RX ORDER — LOSARTAN POTASSIUM 25 MG/1
25 TABLET ORAL DAILY
COMMUNITY
Start: 2025-06-05

## 2025-08-13 RX ORDER — PANTOPRAZOLE SODIUM 40 MG/1
40 TABLET, DELAYED RELEASE ORAL DAILY
COMMUNITY
Start: 2025-08-10

## 2025-08-13 RX ORDER — NITROFURANTOIN 25; 75 MG/1; MG/1
100 CAPSULE ORAL 2 TIMES DAILY
COMMUNITY
Start: 2025-08-06

## 2025-08-13 ASSESSMENT — ENCOUNTER SYMPTOMS: SHORTNESS OF BREATH: 1

## 2025-08-20 RX ORDER — FERROUS SULFATE 325(65) MG
1 TABLET ORAL DAILY
Qty: 30 TABLET | Refills: 3 | Status: SHIPPED | OUTPATIENT
Start: 2025-08-20

## 2025-08-20 ASSESSMENT — ENCOUNTER SYMPTOMS
EYE DISCHARGE: 0
NAUSEA: 0
EYE REDNESS: 0
SORE THROAT: 0
EYE PAIN: 0
GASTROINTESTINAL NEGATIVE: 1
DIARRHEA: 0
CONSTIPATION: 0
BACK PAIN: 0
BLOOD IN STOOL: 0
ABDOMINAL PAIN: 0
EYES NEGATIVE: 1
VOMITING: 0
WHEEZING: 0
COUGH: 0

## 2025-09-03 ENCOUNTER — TELEPHONE (OUTPATIENT)
Dept: HEMATOLOGY | Age: 75
End: 2025-09-03

## 2025-09-03 DIAGNOSIS — R22.1 NODULE OF NECK: Primary | ICD-10-CM

## (undated) DEVICE — GLV SURG SENSICARE W/ALOE PF LF 7.5 STRL

## (undated) DEVICE — AIRLIFE™ NASAL OXYGEN CANNULA CURVED, NONFLARED TIP, WITH 7' FEET (2.1 M) CRUSH RESISTANT TUBING, OVER-THE-EAR STYLE: Brand: AIRLIFE™

## (undated) DEVICE — SHEATH INTRO MICRO 7F 11CM

## (undated) DEVICE — STPCK 4WY ON/OFF VLV M/COLAR FIT 45PSI STRL

## (undated) DEVICE — Device: Brand: DEFENDO AIR/WATER/SUCTION AND BIOPSY VALVE

## (undated) DEVICE — Z DISCONTINUED USE 2526318 PAD ES AD DISPER GRND SLD W CRD 1100 SER

## (undated) DEVICE — GLOVE SURG SZ 7 CRM LTX FREE POLYISOPRENE POLYMER BEAD ANTI

## (undated) DEVICE — COVER,MAYO STAND,STERILE: Brand: MEDLINE

## (undated) DEVICE — AIRWAY CIRCUIT: Brand: DEROYAL

## (undated) DEVICE — BANDAGE,GAUZE,BULKEE II,4.5"X4.1YD,STRL: Brand: MEDLINE

## (undated) DEVICE — BNDG ELAS ECON W/CLIP 4IN 5YD LF STRL

## (undated) DEVICE — CATH ENDOVENOUS RF/ABL CLOSUREFAST 7F 100CM

## (undated) DEVICE — PAD MINOR UNIVERSAL: Brand: MEDLINE INDUSTRIES, INC.

## (undated) DEVICE — SPONGE GZ W4XL4IN COT 12 PLY TYP VII WVN C FLD DSGN

## (undated) DEVICE — CONMED GOLDLINE ELECTROSURGICAL HANDPIECE, HAND CONTROLLED WITH BLADE ELECTRODE, BUTTON SWITCH, SAFETY HOLSTER AND 10 FT (3 M) CABLE: Brand: CONMED GOLDLINE

## (undated) DEVICE — SENSR O2 OXIMAX FNGR A/ 18IN NONSTR

## (undated) DEVICE — GLOVE ORANGE PI 7 1/2   MSG9075

## (undated) DEVICE — ST INF 2NDARY 20DRP VNT/NOVNT 30IN

## (undated) DEVICE — CONMED SCOPE SAVER BITE BLOCK, 20X27 MM: Brand: SCOPE SAVER

## (undated) DEVICE — YANKAUER,BULB TIP WITH VENT: Brand: ARGYLE

## (undated) DEVICE — ST TB EXT STANDARDBORE 30IN

## (undated) DEVICE — THE CHANNEL CLEANING BRUSH IS A NYLON FLEXI BRUSH ATTACHED TO A FLEXIBLE PLASTIC SHEATH DESIGNED TO SAFELY REMOVE DEBRIS FROM FLEXIBLE ENDOSCOPES.

## (undated) DEVICE — Z DISCONTINUED USE 2218182 SUTURE ABSORBABLE MONOFILAMENT 6-0 PC1 18 IN 13 MM PLN GUT

## (undated) DEVICE — STERILE (14X122CM) TELESCOPICALLY-FOLDED COVER: Brand: CIV-CLEAR™ TRANSDUCER COVER

## (undated) DEVICE — MASK LG ADLT ANES MEDICHOICE

## (undated) DEVICE — BNDG ADHS CURAD FLX/FABRC 1X3IN STRL LF

## (undated) DEVICE — STRIP,CLOSURE,WOUND,MEDI-STRIP,1/2X4: Brand: MEDLINE

## (undated) DEVICE — LIGATOR MULTIBAND SUPERVIEW 7 BX4

## (undated) DEVICE — AMBU AURAONCE U SIZE 3: Brand: AURAONCE

## (undated) DEVICE — SHEET,DRAPE,53X77,STERILE: Brand: MEDLINE

## (undated) DEVICE — INTENDED FOR TISSUE SEPARATION, AND OTHER PROCEDURES THAT REQUIRE A SHARP SURGICAL BLADE TO PUNCTURE OR CUT.: Brand: BARD-PARKER ® STAINLESS STEEL BLADES

## (undated) DEVICE — GAUZE,SPONGE,4"X4",12PLY,STERILE,LF,2'S: Brand: MEDLINE

## (undated) DEVICE — SYR LL TP 10ML STRL

## (undated) DEVICE — BNDG ELAS W/CLIP 6IN 10YD LF STRL

## (undated) DEVICE — INTENDED FOR TISSUE SEPARATION, AND OTHER PROCEDURES THAT REQUIRE A SHARP SURGICAL BLADE TO PUNCTURE OR CUT.: Brand: BARD-PARKER ®  SAFETY SCALPED

## (undated) DEVICE — STERILE ULTRASOUND GEL, SAFEWRAP: Brand: ECOVUE

## (undated) DEVICE — NEEDLE, QUINCKE, 20GX3.5": Brand: MEDLINE

## (undated) DEVICE — CUFF,BP,DISP,1 TUBE,ADULT,HP: Brand: MEDLINE